# Patient Record
Sex: MALE | Race: WHITE | NOT HISPANIC OR LATINO | Employment: FULL TIME | URBAN - METROPOLITAN AREA
[De-identification: names, ages, dates, MRNs, and addresses within clinical notes are randomized per-mention and may not be internally consistent; named-entity substitution may affect disease eponyms.]

---

## 2018-09-08 NOTE — PROGRESS NOTES
[facility] Consult:  Consult from: Dr Niki Wilder  Consult for: Prostate nodule    Jones Germain is a 58 y.o. male who presents for evaluation of prostate nodule at the referral of Dr Wilder, who noted its presence on her last physical exam of patient 7/2/18.    Atripla for HIV x 25 years. Reports normal CD4 count and undetectable viral load. Has history of kidney stones - in 2006. Went to [facility] for lithotripsy at that time.    NTF 1-2. No urinary hesitancy. Rare intermittency. Feels like he empties his bladder.  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 Does drink afternoon/evening coffee  Has had UTIs in remote past but has been so long cant remember the last time he had one.  No blood in urine.  + smoker - 40 years at about 1 pack.  Only one stone he knows of in 2006 as above, no family history of stones.  No family history of any  malignancy.    Udip trc leuks, trc prot, 2+ blood    [facility] 7/5/18  psa 2.0, UA trc blood - UA micro with 0 rbc/hpf. eGFR 97, HIV RNA <20 (undetectable)  He does take ASA, [place].    Past Medical History:   Diagnosis Date    HIV (human immunodeficiency virus infection)     Hypercholesterolemia        History reviewed. No pertinent surgical history.    History reviewed. No pertinent family history.    Social History     Socioeconomic History    Marital status: Single     Spouse name: Not on file    Number of children: Not on file    Years of education: Not on file    Highest education level: Not on file   Social Needs    Financial resource strain: Not on file    Food insecurity - worry: Not on file    Food insecurity - inability: Not on file    Transportation needs - medical: Not on file    Transportation needs - non-medical: Not on file   Occupational History    Not on file   Tobacco Use    Smoking status: Current Every Day Smoker    Smokeless tobacco: Never Used   Substance and Sexual Activity    Alcohol use: No     Frequency: Never    Drug use:  "No    Sexual activity: Not on file   Other Topics Concern    Not on file   Social History Narrative    Not on file       Review of patient's allergies indicates:  No Known Allergies    Medications Reviewed: see MAR    ROS:    Constitutional: denies fevers, chills, night sweats, fatigue, malaise  Respiratory: negative for cough, shortness of breath, wheezing, dyspnea.  Cardiovascular:  negative for chest pain, varicose veins, ankle swelling, palpitations, syncope.  GI: negative for abdominal pain, heartburn, indigestion, nausea, vomiting, constipation, diarrhea, blood in stool.   Urology: as noted above in HPI  Endocrinology: negative for cold intolerance, excessive thirst, not feeling tired/sluggish, no heat intolerance.   Hematology/Lymph: negative for easy bleeding, easy bruising, swollen glands.  Musculoskeletal: negative for back pain, joint pain, joint swelling, neck pain.  Allergy-Immunology: negative for seasonal allergies, + HIV  Skin: negative for boils, breast lumps, hives, itching, rash.   Neurology: negative for, dizziness, headache, tingling/numbness, tremors.   Psych: satisfied with life; negative for, anxiety, depression, suicidal thoughts.     PHYSICAL EXAM:    Vitals:    09/10/18 0937   BP: 138/88   Pulse: 84   Resp: 18   Temp: 97.8 °F (36.6 °C)     Body mass index is 25.18 kg/m². Weight: 70.8 kg (156 lb) Height: 5' 6" (167.6 cm)       General: Alert, cooperative, no distress, appears stated age  Head: Normocephalic, without obvious abnormality, atraumatic  Neck: no masses, no thyromegaly, no lymphadenopathy  Eyes: PERRL, conjunctiva/corneas clear  Lungs: Respirations unlabored, normal effort, no accessory muscle use  CV: Warm and well perfused extremities  Abdomen: Soft, non-tender, no CVA tenderness, no hepatosplenomegaly, small left inguinal hernia impulse  Penis: phallus normal, circumcised, well cared for, no plaques or lesions.   Scrotum: no cysts, no lesions, no rash, no hydrocele. "   Epididymes: normal, nontender, symmetrical, no masses or cysts.   Testes: normal, both descended, no masses.   Urethra: palpably normal with orthotopic meatus of normal size    ERIBERTO: normal sphincter tone, no masses, no hemmorrhoids   PROSTATE: 20-25g, nontender, smooth symmetrical except for small <0.5cm firn nodule just to left of central sulcus.  Extremities: Extremities normal, atraumatic, no cyanosis or edema  Skin: Normal color, texture, and turgor, no rashes or lesions  Psych: Appropriate, well oriented, normal affect, normal mood  Neuro: Non-focal      Assessment/Diagnosis:    1. Prostate nodule  Case Request Operating Room: BIOPSY, PROSTATE, TRANSRECTAL APPROACH, WITH US GUIDANCE    Place in Outpatient    POCT URINE DIPSTICK WITHOUT MICROSCOPE   2. Cells and casts in urine  Urine culture    CANCELED: Urinalysis Microscopic       Plans:  I had a long discussion with the patient regarding the natural history of cancer in men as well as when diagnostics are indicated. We also discussed differential for elevated psa which also includes benign enlargement and prostatitis.  We did discuss that an elevated PSA is considered a PSA greater than 4 because statistically 20% of people in this value range are found to have prostate cancer, however we also discussed a bit about PSA velocity and trends and age specific psa elevations.  We did also discuss that despite PSA, findings a prostate nodule necessitate further investigation as well.  Given his discrete prostate nodule on physical exam, as well as history of immunodeficiency, I therefore recommended prostate biopsy to evaluate for underlying malignancy.  We discussed biopsy and in detail, including 1% risk of infectious complications including sepsis but that it is an otherwise safe diagnostic procedure with expected hematuria hematospermia after.      I went over the details of a transrectal ultrasound-guided biopsy of the prostate, and described the technique  in detail.   The patient will be given local injection anesthetic to block the prostate so as to minimize any pain. 12-14 biopsy specimens will be taken. These will be sent for histopathology analysis.   Complications include bleeding, fever and chills. He was also instructed to watch for any signs of fever. If he does have any fever or chills after, he was advised to come to the emergency room right away for intravenous antibiotics and possible admission to the hospital. He is to refrain from any strenuous activity including sexual activity for the next 72 hours after biopsy. He was also advised that he may have blood while urinating, during bowel movements as well as during ejaculations. He was given a prebiopsy/postbiopsy instruction sheet was reminding him to avoid aspirin and blood thinners for 7 days prior, take the Rxed antibiotics the day before, day of, day after biopsy, and perform a fleet enema at home morning of biopsy. All questions he had were answered in detail.     His urine dipstick today was positive for blood, though he most recently had formal urinalysis which was positive for blood as well but urinalysis microscopic exam which had no red blood cells.  As he just had a negative UA micro, will not repeat it today, though will send urine for culture in anticipation of upcoming procedure and for further evaluation given urinalysis dipstick for leukocytes and blood.     TRUS/bx scheduled at [facility] on 9/25/18 at patient's request

## 2018-09-08 NOTE — H&P (VIEW-ONLY)
[facility] Consult:  Consult from: Dr Niki Wilder  Consult for: Prostate nodule    Jones Germain is a 58 y.o. male who presents for evaluation of prostate nodule at the referral of Dr Wilder, who noted its presence on her last physical exam of patient 7/2/18.    Atripla for HIV x 25 years. Reports normal CD4 count and undetectable viral load. Has history of kidney stones - in 2006. Went to [facility] for lithotripsy at that time.    NTF 1-2. No urinary hesitancy. Rare intermittency. Feels like he empties his bladder.  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 Does drink afternoon/evening coffee  Has had UTIs in remote past but has been so long cant remember the last time he had one.  No blood in urine.  + smoker - 40 years at about 1 pack.  Only one stone he knows of in 2006 as above, no family history of stones.  No family history of any  malignancy.    Udip trc leuks, trc prot, 2+ blood    [facility] 7/5/18  psa 2.0, UA trc blood - UA micro with 0 rbc/hpf. eGFR 97, HIV RNA <20 (undetectable)  He does take ASA, [place].    Past Medical History:   Diagnosis Date    HIV (human immunodeficiency virus infection)     Hypercholesterolemia        History reviewed. No pertinent surgical history.    History reviewed. No pertinent family history.    Social History     Socioeconomic History    Marital status: Single     Spouse name: Not on file    Number of children: Not on file    Years of education: Not on file    Highest education level: Not on file   Social Needs    Financial resource strain: Not on file    Food insecurity - worry: Not on file    Food insecurity - inability: Not on file    Transportation needs - medical: Not on file    Transportation needs - non-medical: Not on file   Occupational History    Not on file   Tobacco Use    Smoking status: Current Every Day Smoker    Smokeless tobacco: Never Used   Substance and Sexual Activity    Alcohol use: No     Frequency: Never    Drug use:  "No    Sexual activity: Not on file   Other Topics Concern    Not on file   Social History Narrative    Not on file       Review of patient's allergies indicates:  No Known Allergies    Medications Reviewed: see MAR    ROS:    Constitutional: denies fevers, chills, night sweats, fatigue, malaise  Respiratory: negative for cough, shortness of breath, wheezing, dyspnea.  Cardiovascular:  negative for chest pain, varicose veins, ankle swelling, palpitations, syncope.  GI: negative for abdominal pain, heartburn, indigestion, nausea, vomiting, constipation, diarrhea, blood in stool.   Urology: as noted above in HPI  Endocrinology: negative for cold intolerance, excessive thirst, not feeling tired/sluggish, no heat intolerance.   Hematology/Lymph: negative for easy bleeding, easy bruising, swollen glands.  Musculoskeletal: negative for back pain, joint pain, joint swelling, neck pain.  Allergy-Immunology: negative for seasonal allergies, + HIV  Skin: negative for boils, breast lumps, hives, itching, rash.   Neurology: negative for, dizziness, headache, tingling/numbness, tremors.   Psych: satisfied with life; negative for, anxiety, depression, suicidal thoughts.     PHYSICAL EXAM:    Vitals:    09/10/18 0937   BP: 138/88   Pulse: 84   Resp: 18   Temp: 97.8 °F (36.6 °C)     Body mass index is 25.18 kg/m². Weight: 70.8 kg (156 lb) Height: 5' 6" (167.6 cm)       General: Alert, cooperative, no distress, appears stated age  Head: Normocephalic, without obvious abnormality, atraumatic  Neck: no masses, no thyromegaly, no lymphadenopathy  Eyes: PERRL, conjunctiva/corneas clear  Lungs: Respirations unlabored, normal effort, no accessory muscle use  CV: Warm and well perfused extremities  Abdomen: Soft, non-tender, no CVA tenderness, no hepatosplenomegaly, small left inguinal hernia impulse  Penis: phallus normal, circumcised, well cared for, no plaques or lesions.   Scrotum: no cysts, no lesions, no rash, no hydrocele. "   Epididymes: normal, nontender, symmetrical, no masses or cysts.   Testes: normal, both descended, no masses.   Urethra: palpably normal with orthotopic meatus of normal size    ERIBERTO: normal sphincter tone, no masses, no hemmorrhoids   PROSTATE: 20-25g, nontender, smooth symmetrical except for small <0.5cm firn nodule just to left of central sulcus.  Extremities: Extremities normal, atraumatic, no cyanosis or edema  Skin: Normal color, texture, and turgor, no rashes or lesions  Psych: Appropriate, well oriented, normal affect, normal mood  Neuro: Non-focal      Assessment/Diagnosis:    1. Prostate nodule  Case Request Operating Room: BIOPSY, PROSTATE, TRANSRECTAL APPROACH, WITH US GUIDANCE    Place in Outpatient    POCT URINE DIPSTICK WITHOUT MICROSCOPE   2. Cells and casts in urine  Urine culture    CANCELED: Urinalysis Microscopic       Plans:  I had a long discussion with the patient regarding the natural history of cancer in men as well as when diagnostics are indicated. We also discussed differential for elevated psa which also includes benign enlargement and prostatitis.  We did discuss that an elevated PSA is considered a PSA greater than 4 because statistically 20% of people in this value range are found to have prostate cancer, however we also discussed a bit about PSA velocity and trends and age specific psa elevations.  We did also discuss that despite PSA, findings a prostate nodule necessitate further investigation as well.  Given his discrete prostate nodule on physical exam, as well as history of immunodeficiency, I therefore recommended prostate biopsy to evaluate for underlying malignancy.  We discussed biopsy and in detail, including 1% risk of infectious complications including sepsis but that it is an otherwise safe diagnostic procedure with expected hematuria hematospermia after.      I went over the details of a transrectal ultrasound-guided biopsy of the prostate, and described the technique  in detail.   The patient will be given local injection anesthetic to block the prostate so as to minimize any pain. 12-14 biopsy specimens will be taken. These will be sent for histopathology analysis.   Complications include bleeding, fever and chills. He was also instructed to watch for any signs of fever. If he does have any fever or chills after, he was advised to come to the emergency room right away for intravenous antibiotics and possible admission to the hospital. He is to refrain from any strenuous activity including sexual activity for the next 72 hours after biopsy. He was also advised that he may have blood while urinating, during bowel movements as well as during ejaculations. He was given a prebiopsy/postbiopsy instruction sheet was reminding him to avoid aspirin and blood thinners for 7 days prior, take the Rxed antibiotics the day before, day of, day after biopsy, and perform a fleet enema at home morning of biopsy. All questions he had were answered in detail.     His urine dipstick today was positive for blood, though he most recently had formal urinalysis which was positive for blood as well but urinalysis microscopic exam which had no red blood cells.  As he just had a negative UA micro, will not repeat it today, though will send urine for culture in anticipation of upcoming procedure and for further evaluation given urinalysis dipstick for leukocytes and blood.     TRUS/bx scheduled at [facility] on 9/25/18 at patient's request

## 2018-09-10 ENCOUNTER — OFFICE VISIT (OUTPATIENT)
Dept: UROLOGY | Facility: CLINIC | Age: 58
End: 2018-09-10
Payer: COMMERCIAL

## 2018-09-10 VITALS
SYSTOLIC BLOOD PRESSURE: 138 MMHG | WEIGHT: 156 LBS | TEMPERATURE: 98 F | RESPIRATION RATE: 18 BRPM | HEART RATE: 84 BPM | BODY MASS INDEX: 25.07 KG/M2 | HEIGHT: 66 IN | DIASTOLIC BLOOD PRESSURE: 88 MMHG

## 2018-09-10 DIAGNOSIS — R82.998 CELLS AND CASTS IN URINE: ICD-10-CM

## 2018-09-10 DIAGNOSIS — N40.2 PROSTATE NODULE: Primary | ICD-10-CM

## 2018-09-10 LAB
BILIRUB SERPL-MCNC: ABNORMAL MG/DL
BLOOD URINE, POC: ABNORMAL
COLOR, POC UA: ABNORMAL
GLUCOSE UR QL STRIP: ABNORMAL
KETONES UR QL STRIP: ABNORMAL
LEUKOCYTE ESTERASE URINE, POC: ABNORMAL
NITRITE, POC UA: ABNORMAL
PH, POC UA: 5
PROTEIN, POC: ABNORMAL
SPECIFIC GRAVITY, POC UA: 1.01
UROBILINOGEN, POC UA: ABNORMAL

## 2018-09-10 PROCEDURE — 87086 URINE CULTURE/COLONY COUNT: CPT

## 2018-09-10 PROCEDURE — 81002 URINALYSIS NONAUTO W/O SCOPE: CPT | Mod: S$GLB,,, | Performed by: UROLOGY

## 2018-09-10 PROCEDURE — 99244 OFF/OP CNSLTJ NEW/EST MOD 40: CPT | Mod: 25,S$GLB,, | Performed by: UROLOGY

## 2018-09-10 PROCEDURE — 99999 PR PBB SHADOW E&M-NEW PATIENT-LVL IV: CPT | Mod: PBBFAC,,, | Performed by: UROLOGY

## 2018-09-10 RX ORDER — EFAVIRENZ, EMTRICITABINE, AND TENOFOVIR DISOPROXIL FUMARATE 600; 200; 300 MG/1; MG/1; MG/1
TABLET, FILM COATED ORAL
COMMUNITY
Start: 2018-09-08 | End: 2018-11-27 | Stop reason: SDUPTHER

## 2018-09-10 RX ORDER — PRAVASTATIN SODIUM 40 MG/1
TABLET ORAL
COMMUNITY
Start: 2018-06-21 | End: 2018-12-27 | Stop reason: SDUPTHER

## 2018-09-10 RX ORDER — GENTAMICIN SULFATE 40 MG/ML
80 INJECTION, SOLUTION INTRAMUSCULAR; INTRAVENOUS ONCE
Status: CANCELLED | OUTPATIENT
Start: 2018-09-25

## 2018-09-10 RX ORDER — CIPROFLOXACIN 500 MG/1
500 TABLET ORAL 2 TIMES DAILY
Qty: 6 TABLET | Refills: 0 | Status: SHIPPED | OUTPATIENT
Start: 2018-09-10 | End: 2018-10-15 | Stop reason: ALTCHOICE

## 2018-09-10 RX ORDER — LIDOCAINE HYDROCHLORIDE 10 MG/ML
10 INJECTION, SOLUTION EPIDURAL; INFILTRATION; INTRACAUDAL; PERINEURAL ONCE
Status: CANCELLED | OUTPATIENT
Start: 2018-09-10 | End: 2018-09-10

## 2018-09-10 NOTE — PATIENT INSTRUCTIONS
Transrectal Ultrasound and Biopsy    A transrectal ultrasound is an imaging test. It uses sound waves to create pictures of a mans prostate gland. Your prostate gland is in front of your rectum. For this test, a special probe (transducer) is placed directly into your rectum. During the test, tissue samples (a biopsy) may also be taken. The test is done by a specially trained technologist called a sonographer.  Getting ready for your test  · You may be asked to clear your bowel before the test. This may be done by injecting liquid into your rectum (an enema). Or it can be done by drinking a special liquid.  · You may be asked not to eat or drink anything after midnight the night before the test.  · Tell your health care provider about any medicines, herbs, or supplements you are taking. This includes any over-the-counter medicines such as aspirin or ibuprofen.  · Answer any questions your provider has about your medical history. This will help your provider tailor the test to your health needs.  During your test  · You may be asked to change into a gown. You will then lie on your side on an exam table, with your knees bent.  · The test is done with a hand-held probe. This is a short, slender laverne. It has a sterile, disposable cover. It is also greased (lubricated) with some gel. It is then gently placed inside your rectum.  · You will feel pressure from the probe. If you feel pain, let your provider know.  · If a biopsy is taken, it is done using a small probe with a very tiny needle on the end. This needle enters your prostate and removes several tiny samples of tissue. These samples are then sent to a lab to be examined. Any mild pain from the biopsy is usually minor.   After your test  Before leaving, you may need to wait for a short time while the images are reviewed. In most cases, you can go back to your normal routine after the test. If you had a biopsy, you may see some blood in your urine, sperm, or stool  for a day or so. This is normal. Your health care provider will let you know when your test results are ready.  In some cases, a diagnosis cant be made from the tissue sample that was taken. If this happens, your provider will talk with you about whether you may need another biopsy. Or you may need a different procedure.  When to call your health care provider  Call your provider if you have:  · Very bloody urine or stool  · A fever lasting 24 to 48 hours  · Any other symptoms that your provider asks you to report, based on your medical condition   Date Last Reviewed: 6/19/2015  © 8867-2715 [employer]. [address] [place] [address], [place] [address]. All rights reserved. This information is not intended as a substitute for professional medical care. Always follow your healthcare professional's instructions.

## 2018-09-10 NOTE — LETTER
September 15, 2018      Julissa Wilder MD  [address] [facility]  [address]  [place] [address]           [place] [facility]  [address] [place] [address] [address]  [place] [address]  Phone: [phone]  Fax: [phone]          Patient: Jones Germain   MR Number: [id number]   YOB: 1960   Date of Visit: 9/10/2018       Dear Dr. Julissa Wilder:    Thank you for referring Jones Germain to me for evaluation. Attached you will find relevant portions of my assessment and plan of care.    If you have questions, please do not hesitate to call me. I look forward to following Jones Germain along with you.    Sincerely,    Baljinder Johnson MD    Enclosure  CC:  No Recipients    If you would like to receive this communication electronically, please contact [email or web address] or [phone] to request more information on [employer] access.    For providers and/or their staff who would like to refer a patient to [facility], please contact us through our one-stop-shop provider referral line, [facility], at [phone].    If you feel you have received this communication in error or would no longer like to receive these types of communications, please e-mail [email or web address]

## 2018-09-11 LAB — BACTERIA UR CULT: NO GROWTH

## 2018-09-25 ENCOUNTER — HOSPITAL ENCOUNTER (OUTPATIENT)
Facility: AMBULARY SURGERY CENTER | Age: 58
Discharge: HOME | End: 2018-09-25
Attending: UROLOGY | Admitting: UROLOGY
Payer: COMMERCIAL

## 2018-09-25 DIAGNOSIS — N40.2 PROSTATE NODULE: ICD-10-CM

## 2018-09-25 PROCEDURE — 55700 PR BIOPSY OF PROSTATE,NEEDLE/PUNCH: CPT | Mod: ,,, | Performed by: UROLOGY

## 2018-09-25 PROCEDURE — 76942 ECHO GUIDE FOR BIOPSY: CPT | Mod: 26,59,, | Performed by: UROLOGY

## 2018-09-25 PROCEDURE — 88305 TISSUE EXAM BY PATHOLOGIST: CPT | Mod: 26,,, | Performed by: PATHOLOGY

## 2018-09-25 PROCEDURE — 88305 TISSUE EXAM BY PATHOLOGIST: CPT | Performed by: PATHOLOGY

## 2018-09-25 PROCEDURE — 76872 US TRANSRECTAL: CPT | Mod: 26,,, | Performed by: UROLOGY

## 2018-09-25 PROCEDURE — 76872 US TRANSRECTAL: CPT | Performed by: UROLOGY

## 2018-09-25 PROCEDURE — 55700 HC PROSTATE NEEDLE BIOPSY: CPT | Performed by: UROLOGY

## 2018-09-25 RX ORDER — LIDOCAINE HYDROCHLORIDE 10 MG/ML
10 INJECTION, SOLUTION EPIDURAL; INFILTRATION; INTRACAUDAL; PERINEURAL ONCE
Status: COMPLETED | OUTPATIENT
Start: 2018-09-25 | End: 2018-09-25

## 2018-09-25 RX ORDER — GENTAMICIN SULFATE 40 MG/ML
80 INJECTION, SOLUTION INTRAMUSCULAR; INTRAVENOUS ONCE
Status: COMPLETED | OUTPATIENT
Start: 2018-09-25 | End: 2018-09-25

## 2018-09-25 RX ORDER — GENTAMICIN SULFATE 40 MG/ML
INJECTION, SOLUTION INTRAMUSCULAR; INTRAVENOUS
Status: DISCONTINUED
Start: 2018-09-25 | End: 2018-09-25 | Stop reason: HOSPADM

## 2018-09-25 RX ADMIN — LIDOCAINE HYDROCHLORIDE 100 MG: 10 INJECTION, SOLUTION EPIDURAL; INFILTRATION; INTRACAUDAL; PERINEURAL at 01:09

## 2018-09-25 RX ADMIN — GENTAMICIN SULFATE 80 MG: 40 INJECTION, SOLUTION INTRAMUSCULAR; INTRAVENOUS at 12:09

## 2018-09-25 NOTE — PLAN OF CARE
Tolerated procedure well, voided, some bleeding, drinks water, ambulatory, discussed post op care.

## 2018-09-25 NOTE — DISCHARGE INSTRUCTIONS
After your prostate biopsy    Avoid sexual activity,lifting, strenuous physical activity or exertion for 3 days     No riding mowers, tractors, bicycles, motorcycles for 2-3 weeks    You may experience blood in your urine or stool for up to 2 weeks and in your semen for up to 6 weeks.  This is a normal side effect of the procedure and will resolve.    Drink plenty of water    Take antibiotics as prescribed    If you experience any of the following conditions, please return immediately to the clinic (during office hrs) or the Emergency Room if after hours:       Fever       Inabiltiy to urinate       Severe bleeding    You may resume aspirin, anti inflammatory and blood thinners in 3 days    Results take at minimum 10 business days.  A 2 week follow up will be scheduled to review pathology reports in the clinic    During office hours, please call  and ask to speak with the nurse if you have any questions.  If after hours, call the [facility] On [facility] # to be connectied t o the doctor on call  Prostate Needle Biopsy    Prostate needle biopsy is a test to look for prostate cancer. During the test, a thin, hollow needle is used to take small samples of tissue from the prostate. The samples are then tested in a lab.  Getting ready for the procedure  Prepare as you have been told. In addition to the following:  · Tell your healthcare provider about all medicines you take. This includes herbs and other supplements. It also includes any blood thinners, such as warfarin, clopidogrel, or daily aspirin. You may need to stop taking some or all of them before the procedure.  · You may be told to use a laxative, enemas, or both before the biopsy. This is to empty the colon and rectum of stool. Follow the instructions you are given.  · Your healthcare provider may prescribe antibiotics before the procedure. If so, take these as directed.  Risks and possible complications   All procedures have risks. The risks of this  procedure include:  · Discomfort in the prostate area  · Infection in the urinary tract or prostate  · Infection in the bloodstream  · Rectal or urinary bleeding   The day of the procedure  The procedure is done in a healthcare providers office or a hospital. It takes about 45 minutes. You will be able to go home the same day. Transrectal ultrasound is often used during the procedure. This test uses sound waves to make images on a computer screen. The images help the healthcare provider insert the needle in the correct place. During the biopsy:  · If ultrasound will be used, you may be asked to drink water to fill your bladder.    · You may lie on your side on an exam table.   · The ultrasound transducer, which is about the size of a finger, is lubricated. It is then inserted into the rectum. This will feel like a prostate exam. The transducer is moved until the prostate can be seen in the ultrasound images.    · To numb the biopsy area, local anesthetics may be injected. You might also be given medicine to make you sleepy.  · Using the ultrasound images as a guide, the biopsy needle is inserted. It may be inserted through the rectum or through the skin between the scrotum and the anus (perineum).  · The needle is used to take tissue samples from the prostate. About 12 samples are taken from different areas of the prostate. These samples are sent to a lab to be tested for cancer.  After the biopsy  At first you may feel a little lightheaded, especially if you had medicine to make you sleepy. You can lie on the table until you feel able to stand. You can go home once you are feeling better. You can go back to your normal activities. You may have some blood in your urine or stool that day. This is normal. You may also notice blood in your semen for weeks after the biopsy. This is normal and not dangerous. Your healthcare provider can tell you more about what to expect.  Follow-up care  You will see your healthcare  provider for a follow-up visit. Depending on the biopsy results, you may be scheduled for more tests. If signs of cancer are found, you and your healthcare provider can discuss options for further testing.  When to call your healthcare provider  Call your healthcare provider if you have any of the following:  · Blood clots in your urine  · Bloody diarrhea  · Blood in the urine or stool that doesnt go away after 48 hours  · Chest pain or trouble breathing (call 911)  · Chills  · Feeling of weakness  · Fever of 100.4°F (38°C) or higher, or as directed by your healthcare provider  · Inability to urinate   Date Last Reviewed: 5/1/2017  © 2093-7602 [employer]. [address] [place] [address], [place] [address]. All rights reserved. This information is not intended as a substitute for professional medical care. Always follow your healthcare professional's instructions.

## 2018-09-25 NOTE — INTERVAL H&P NOTE
The patient has been examined and the H&P has been reviewed:    Pt is acceptable candidate for procedure at [facility]. No changes    Anesthesia/Surgery risks, benefits and alternative options discussed and understood by patient/family.          Active Hospital Problems    Diagnosis  POA    Prostate nodule [N40.2]  Yes      Resolved Hospital Problems   No resolved problems to display.

## 2018-09-26 VITALS
SYSTOLIC BLOOD PRESSURE: 128 MMHG | TEMPERATURE: 98 F | BODY MASS INDEX: 25.07 KG/M2 | WEIGHT: 156 LBS | DIASTOLIC BLOOD PRESSURE: 91 MMHG | HEIGHT: 66 IN | HEART RATE: 80 BPM | RESPIRATION RATE: 20 BRPM | OXYGEN SATURATION: 100 %

## 2018-09-26 NOTE — OP NOTE
[facility] Urology Operative/Brief Discharge Note     Date: 9/25/18     Staff Surgeon: Baljinder Johnson MD     Pre-Op Diagnosis:   Prostate nodule     Post-Op Diagnosis: same     Procedure(s) Performed:   Transrectal ultrasound guided prostate needle biopsy     INDICATION FOR PROCEDURE:   59 yo M with HIV found to have prostate nodule on ERIBERTO, small at LA.     ANESTHESIA: Local periprostatic block; 10 cc 1% lidocaine     PSA: 2.0     TRUS VOLUME:57.3cm3  (W 50.3mm, H 35.7mm, L 61mm)     EBL: Minimal     SPECIMEN:   15 Prostate Biopsy Cores: right and left base, apex, and mid - medial and lateral of each  and bilateral transition zones, as well as core at LA nodule     ULTRASOUND FINDINGS: + median lobe, hypoechoities in L SV, mildly heterogenous apices, extension at LA consistent with palpable nodule     CONFIRMED PATIENT TOOK ANTIBIOTICS: Yes     CONFIRMED PATIENT NOT TAKING ASPIRIN OR ANTICOAGULANTS: Yes     CONFIRMED PATIENT USED ENEMA: Yes     PROCEDURE IN DETAIL:  After informed consent, the patient was placed in the left lateral position and TRUS probe inserted into rectum. Ultrasound measurements taken as above and ultrasound of prostate performed with findings as above. Saggital image captured.     Approximately 10cc of 1% lidocaine injected bilaterally in lakshmi-prostatic block fashion, as well as at the apex.   15 core biopies taken in a sextant fashion, as described in specimens as above, ultrasound guided.  Standard 12 core biopsy template, with the addition of transition zones, and a core at visualized nodule     Pt tolerated procedure well without complication     CONDITION: Stable     DISHARGE:  Status post uncomplicated outpatient procedure as above.   Disposition: Home.  He will follow up in 2 weeks for biopsy results.    Resume regular diet  FU 2 weeks for biopsy results  Return to ER if temp >101, uncontrollable urethral or rectal bleeding, or inability to urinate/urinary retention  No sex/ejaculation  x3 days, no riding mowers/tractors/bikes x2-4 weeks  Hold asa/bloodthinners x 3 days  Drink plenty of water may see blood in urine  Follow instructions of biopsy sheet

## 2018-10-07 NOTE — PROGRESS NOTES
[facility] Urology Progress Note    Jones Germain is a 58 y.o. male who presents for follow up of prostate biopsy 2/2 to prostate nodule    He has a history of HIV and has been on atripla for 25 years with undetectable viral, who was referred by Dr. Wilder after discovering prostate nodule on exam in July 2018  PSA 2.0. No famHx CaP  Has history of kidney stones - in 2006. Went to [facility] for lithotripsy at that time. Only the one episode  NTF 1-2. No urinary hesitancy. Rare intermittency. Feels like he empties his bladder.  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 Does drink afternoon/evening coffee  + smoker - 40 years at about 1 pack.  ERIBERTO: 20-25g, nontender, smooth symmetrical except for small <0.5cm firn nodule just to left of central sulcus    Prostate biopsy 9/25/18:  TRUS VOLUME:57.3cm3 (W 50.3mm, H 35.7mm, L 61mm)  SPECIMEN:    ULTRASOUND FINDINGS: + median lobe, hypoechoities in L SV, mildly heterogenous apices, extension at LA consistent with palpable nodule  PATHOLOGY: 5/15 cores aiden 3+3=6 CaP, largely left sided, and one core ASAP as below    5. PROSTATE, RIGHT APEX LATERAL, BIOPSY:  - Small focus of prostatic adenocarcinoma, Given score 3+3=6 (Grade group 1), involving less than 5% of  one (1) core.  - Total length of carcinoma is <1 mm.  6. PROSTATE, RIGHT APEX MEDIAL, BIOPSY:  - Prostatic adenocarcinoma, Aiden score 3+3=6 (Grade group 1) involving 10% of one (1) core.  - Total length of carcinoma is 1 mm.  - Perineural invasion is present.  7. PROSTATE, RIGHT TRANSITION ZONE, BIOPSY:  - Prostate tissue with small focus of atypical glands, suspicious for carcinoma.  11. PROSTATE, LEFT MID MEDIAL, BIOPSY:  - Small focus of prostatic adenocarcinoma, Given score 3+3=6 (Grade group 1), involving less than 5% of  one (1) core.  - Total length of carcinoma is <1 mm.  12. PROSTATE, LEFT APEX LATERAL, BIOPSY:  - Prostatic adenocarcinoma, Aiden score 3+3=6 (Grade group 1) involving 20% of one  "(1) core.  - Total length of carcinoma is 2 mm.  14. PROSTATE, LEFT TRANSITION ZONE, BIOPSY:  - Prostatic adenocarcinoma, Aiden score 3+3=6 (Grade group 1) involving 30% of one (1) core.  - Total length of carcinoma is 3.5 mm.    He did well after the prostate biopsy with minimal discomfort, minimal bleeding.  No fevers, no chills.  Returns today to discuss pathology and next steps    ROS: A comprehensive 10 system review was performed and is negative except as noted above in HPI    PHYSICAL EXAM:    Vitals:    10/15/18 1432   BP: 120/73   Pulse: 92   Resp: 18   Temp: 98.6 °F (37 °C)     Body mass index is 25.19 kg/m². Weight: 70.8 kg (156 lb 1.4 oz) Height: 5' 6" (167.6 cm)       General: Alert, cooperative, no distress, appears stated age   Head: Normocephalic, without obvious abnormality, atraumatic   Eyes: PERRL, conjunctiva/corneas clear   Lungs: Respirations unlabored   Heart: Warm and well perfused   Abdomen: sift NT ND   Extremities: Extremities normal, atraumatic, no cyanosis or edema   Skin: Skin color, texture, turgor normal, no rashes or lesions   Psych: Appropriate   Neurologic: Non-focal       ASSESSMENT   1. Prostate cancer  PROSTATE SPECIFIC ANTIGEN, DIAGNOSTIC   2. Prostate nodule         Plan    I sat with the patient and explained in detail his diagnosis of prostate cancer, including explanation of aiden grading system, and went over all the treatment options for management of his prostate cancer. The treatment options include detailed discussions of active surveillance, radiation treatment including external beam as well as brachytherapy, and surgical extirpative therapy namely robotic prostatectomy. We did discuss that given his pathology of aiden 6 CaP that active surveillance is an option, though discussed that given number of cores positive with palpable nodule and this clinical T2a disease, would likely benefit from treatment. Risks and benefits of radiation and surgery, as well as " surveillance protocols, were discussed in detail and I answered many questions about various aspects of all options, of which I answered radiation related questions to the best of my ability.     We discussed radical prostatectomy. The procedure in general including hospital stay and postoperative process were discussed in detail. Risks of surgery were discussed including but not limited to up-front urinary incontinence and erectile dysfunction which we will work to overcome with kegel excercises and any number of ED therapies, versus side effects of radiation which are often minimal and irritative upfront with more troubling complications such as stricture and radiation cystitis occurring late. We also briefly discussed psa monitoring post-treatment and had brief discussion about psa recurrence, noting radiation could be used as salvage therapy after surgery but not often vice versa. We discussed concurrent pelvic lymphadenectomy with surgery, and that sometimes lymph nodes are included in radiation fields dependent on risk. Also discussed concurrent use of ADT with radiation and its side effects such as fatigue, hot flashes, ED. Given G6 pathology, could likely eliminate ADT.   I did elaborate on the urologist's involvement in radiation treatment such as starting ADT and placing fiducial markers about 6 weeks later before starting XRT. The need for monitoring his PSA postoperatively was also discussed with the patient regardless of treatment modality selected.     We did also discuss active surveillance, following PSA closely every 6 months rather than annually to establish trend, with repeat biopsy at minimum at 1 year as a confirmatory biopsy, targeting for areas of previous positivity to determine if any pathologic upgrading or upstaging.  Should PSA be stable, and repeat biopsy be stable, can alternate evaluations with biopsy and MRI, increasing interval between investigations unless PSA velocity increases at  which time can re-evaluate based on PSA.  - specific to this patient we did discuss again that although he has low risk [place] 6 pathology, he does have palpable nodule, is young for prostate cancer at age 58 which could portend increased anxiety/expense/risks of further investigations on surveillance protocol, and may have more benefit from up-front treatment of prostate cancer albeit with noted side effects as discussed of treatment options.       I did use the [facility] nomogram, noting it was based on surgical outcomes and final pathology review, but used to assess risk and profile his disease process. Demonstrated 99% disease specific survival and 96% 5-year / 92% 10-year progression-free survival after prostatectomy, with 40% risk of RADHA with 2% risk of LN involvement, 1% risk of SVI. He had several good questions which were all answered in detail. I also provided a copy of the [facility] patient guide to Localized Prostate Cancer. At this time I encouraged he to read through the materials provided and make notes with any questions.     I offered to make a referral to a radiation-oncologist to discuss radiation option further, and he deferred at this time.  We did briefly discuss minimally invasive/robotic prostatectomy, and a pamphlet on robotic prostatectomy was provided. Specifics to surgical procedure, hospital stay, and recovery were discussed.    At this time, the patient would like to start active surveillance after extensive discussion of risks, benefits, treatment options.  He did note that if he was to proceed with treatment he would prefer to do so when he is off for the summer next year.  He will consider treatment in that interval, and I did note that we will continue active surveillance and PSA screening, and if in less than 1 year's time he does decide to proceed with treatment, no repeat biopsy would be necessary.     45 minutes spent in face to face encounter with patient, over  half in counseling.  RTC 6 months with PSA prior  As we did discuss minimally invasive robotic prostatectomy, length of catheterization, and recovery., noting relative quick return to work, if he would like to proceed anywhere in the interim he is welcome to follow up prior to 6 months time

## 2018-10-15 ENCOUNTER — OFFICE VISIT (OUTPATIENT)
Dept: UROLOGY | Facility: CLINIC | Age: 58
End: 2018-10-15
Payer: COMMERCIAL

## 2018-10-15 VITALS
WEIGHT: 156.06 LBS | HEIGHT: 66 IN | TEMPERATURE: 99 F | RESPIRATION RATE: 18 BRPM | DIASTOLIC BLOOD PRESSURE: 73 MMHG | HEART RATE: 92 BPM | SYSTOLIC BLOOD PRESSURE: 120 MMHG | BODY MASS INDEX: 25.08 KG/M2

## 2018-10-15 DIAGNOSIS — N40.2 PROSTATE NODULE: ICD-10-CM

## 2018-10-15 DIAGNOSIS — C61 PROSTATE CANCER: Primary | ICD-10-CM

## 2018-10-15 PROCEDURE — 99215 OFFICE O/P EST HI 40 MIN: CPT | Mod: S$GLB,,, | Performed by: UROLOGY

## 2018-10-15 PROCEDURE — 3008F BODY MASS INDEX DOCD: CPT | Mod: CPTII,S$GLB,, | Performed by: UROLOGY

## 2018-10-15 PROCEDURE — 99999 PR PBB SHADOW E&M-EST. PATIENT-LVL III: CPT | Mod: PBBFAC,,, | Performed by: UROLOGY

## 2018-10-15 NOTE — LETTER
October 20, 2018        Julissa Wilder MD  [address]  [address]  [place] [address]             [place] [facility]  [address] [place] [address] [address]  [place] [address]  Phone: [phone]  Fax: [phone]   Patient: Jones Germain   MR Number: [id number]   YOB: 1960   Date of Visit: 10/15/2018       Dear Dr. Wilder:    Thank you for referring Jones Germain to me for evaluation. Attached you will find relevant portions of my assessment and plan of care.    If you have questions, please do not hesitate to call me. I look forward to following Jones Germain along with you.    Sincerely,      Baljinder Johnson MD            CC  No Recipients    Enclosure

## 2018-11-27 DIAGNOSIS — B20 HUMAN IMMUNODEFICIENCY VIRUS (HIV) DISEASE: Primary | ICD-10-CM

## 2018-11-27 RX ORDER — EFAVIRENZ, EMTRICITABINE AND TENOFOVIR DISOPROXIL FUMARATE 600; 200; 300 MG/1; MG/1; MG/1
1 TABLET, FILM COATED ORAL NIGHTLY
Qty: 30 TABLET | Refills: 6 | Status: SHIPPED | OUTPATIENT
Start: 2018-11-27 | End: 2018-12-27 | Stop reason: SDUPTHER

## 2018-11-27 NOTE — TELEPHONE ENCOUNTER
Insurance will no longer cover atripla in Brand name beginning January/2019.  I told we would send it in as generic and if we get something back from the pharmacy I will then give his insurance company a call @ [phone].  He also provided me with a web address as well.  [email or web address]

## 2018-12-26 RX ORDER — NAPROXEN SODIUM 220 MG/1
81 TABLET, FILM COATED ORAL DAILY
COMMUNITY
End: 2020-11-11 | Stop reason: SDUPTHER

## 2018-12-27 ENCOUNTER — OFFICE VISIT (OUTPATIENT)
Dept: INFECTIOUS DISEASES | Facility: CLINIC | Age: 58
End: 2018-12-27
Payer: COMMERCIAL

## 2018-12-27 VITALS
OXYGEN SATURATION: 96 % | DIASTOLIC BLOOD PRESSURE: 75 MMHG | BODY MASS INDEX: 24.11 KG/M2 | HEART RATE: 116 BPM | WEIGHT: 150 LBS | HEIGHT: 66 IN | SYSTOLIC BLOOD PRESSURE: 120 MMHG | TEMPERATURE: 97 F

## 2018-12-27 DIAGNOSIS — Z01.818 PRE-OPERATIVE EXAMINATION FOR INTERNAL MEDICINE: ICD-10-CM

## 2018-12-27 DIAGNOSIS — C61 PROSTATE CANCER: ICD-10-CM

## 2018-12-27 DIAGNOSIS — E88.1 LIPODYSTROPHY DUE TO HIV INFECTION AND ANTIRETROVIRAL THERAPY: ICD-10-CM

## 2018-12-27 DIAGNOSIS — F17.200 SMOKER: ICD-10-CM

## 2018-12-27 DIAGNOSIS — E78.00 HIGH CHOLESTEROL: ICD-10-CM

## 2018-12-27 DIAGNOSIS — B20 LIPODYSTROPHY DUE TO HIV INFECTION AND ANTIRETROVIRAL THERAPY: ICD-10-CM

## 2018-12-27 DIAGNOSIS — B20 HUMAN IMMUNODEFICIENCY VIRUS (HIV) DISEASE: Primary | ICD-10-CM

## 2018-12-27 DIAGNOSIS — Z79.899 LIPODYSTROPHY DUE TO HIV INFECTION AND ANTIRETROVIRAL THERAPY: ICD-10-CM

## 2018-12-27 PROCEDURE — 99214 OFFICE O/P EST MOD 30 MIN: CPT | Mod: ,,, | Performed by: INTERNAL MEDICINE

## 2018-12-27 PROCEDURE — 3008F BODY MASS INDEX DOCD: CPT | Mod: ,,, | Performed by: INTERNAL MEDICINE

## 2018-12-27 RX ORDER — EFAVIRENZ, EMTRICITABINE AND TENOFOVIR DISOPROXIL FUMARATE 600; 200; 300 MG/1; MG/1; MG/1
1 TABLET, FILM COATED ORAL NIGHTLY
Qty: 30 TABLET | Refills: 6 | Status: SHIPPED | OUTPATIENT
Start: 2018-12-27 | End: 2019-01-08 | Stop reason: ALTCHOICE

## 2018-12-27 RX ORDER — PRAVASTATIN SODIUM 40 MG/1
40 TABLET ORAL DAILY
Qty: 30 TABLET | Refills: 6 | Status: SHIPPED | OUTPATIENT
Start: 2018-12-27 | End: 2020-03-17 | Stop reason: SDUPTHER

## 2018-12-27 RX ORDER — BUPROPION HYDROCHLORIDE 150 MG/1
150 TABLET ORAL EVERY MORNING
Qty: 30 TABLET | Refills: 6 | Status: SHIPPED | OUTPATIENT
Start: 2018-12-27 | End: 2020-10-07

## 2018-12-27 NOTE — PROGRESS NOTES
Subjective:       Patient ID: Jones Germain is a 58 y.o. male.    Chief Complaint:: Follow-up (6 mos check up)    HPI  Discussed prostate cancer diagnosis and options, admits depression. Concerned about requiring catheter, being incontinent, radiation adverse effects. He is planning to do the surgery before summer break so that he will have time to recover.  Did receive his flu vaccine  Converted to generic atripla  Compliant with meds. Still smoking. Has not yet established with primary care. Discussed that he will need clearance for surgery and anesthesia      Current Outpatient Medications:     aspirin 81 MG Chew, Take 81 mg by mouth once daily., Disp: , Rfl:     efavirenz-emtrictabine-tenofovir 600-200-300 mg (ATRIPLA) 600-200-300 mg Tab, Take 1 tablet by mouth every evening., Disp: 30 tablet, Rfl: 6    pravastatin (PRAVACHOL) 40 MG tablet, Take 1 tablet (40 mg total) by mouth once daily., Disp: 30 tablet, Rfl: 6    buPROPion (WELLBUTRIN XL) 150 MG TB24 tablet, Take 1 tablet (150 mg total) by mouth every morning., Disp: 30 tablet, Rfl: 6    varicella-zoster gE-AS01B, PF, (SHINGRIX, PF,) 50 mcg/0.5 mL injection, Inject 0.5 mLs into the muscle once. Once now and second dose in 2-6 months for 1 dose, Disp: 0.5 mL, Rfl: 0  Review of patient's allergies indicates:  No Known Allergies  Past Medical History:   Diagnosis Date    Adverse effect of hepatitis B vaccine     non responder    Hepatitis A 1991    High frequency hearing loss     HIV (human immunodeficiency virus infection) 1995    On COM/cRix until kidney stones, sallie 200    Hypercholesterolemia     Hyperlipidemia     Kidney stone 2005    [facility], Lithotripsy    Lipoatrophy     Mild    Pertussis     As a child    Prostate cancer     Secondary syphilis 2002    Smoker     Vertigo 02/2018    Due to Ear Infection     Past Surgical History:   Procedure Laterality Date    BIOPSY, PROSTATE, RECTAL APPROACH, WITH US GUIDANCE N/A 9/25/2018    Performed  by [facility]jinder [facility] at [facility]     Social History     Socioeconomic History    Marital status: Single     Spouse name: None    Number of children: None    Years of education: None    Highest education level: None   Social Needs    Financial resource strain: None    Food insecurity - worry: None    Food insecurity - inability: None    Transportation needs - medical: None    Transportation needs - non-medical: None   Occupational History    Occupation: Teacher   Tobacco Use    Smoking status: Current Every Day Smoker     Types: Cigarettes    Smokeless tobacco: Never Used    Tobacco comment: 20 to 30   Substance and Sexual Activity    Alcohol use: No     Frequency: Never    Drug use: No    Sexual activity: No     Comment: Abstinent   Other Topics Concern    None   Social History Narrative    None     Family History   Problem Relation Age of Onset    Diabetes Mother          in a MVC    COPD Father     Lung cancer Paternal Grandmother        Travel History:   Vaccine History: Yearly flu vaccine, Pneumovax , , Prevnar , tetanus , Td AP , Zostavax 2016, hepatitis B ×3   Advanced Directive:   Safer Sex: Abstinent  Bone Density: 2011 osteopenia  Colonoscopy:     Review of Systems    Constitutional: No fever, chills, sweats, fatigue, weakness, weight loss    Eyes: No change in vision, loss of vision, diplopia, photophobia.     ENT: No sinus drainage, sore throat, mouth pain, or lesions.     Cardiovascular: No chest pain, GAVIN, palpitations or pedal edema    Respiratory: No shortness of breath, GAVIN, cough, wheeze, sputum, pleurisy, or hemoptysis    Gastrointestinal: No abdominal pain, nausea, vomiting, diarrhea, constipation, blood in stool, or focal abd pain    Genitourinary: No dysuria, hematuria, incontinence, frequency, flank pain, has nocturia ×2     Musculoskeletal: No new pain, joint swelling, or injuries    Integumentary: No new rashes, lesions, or  "wounds    Neurological: No dizziness, vertigo, unusual headaches, neuropathy, or falls    Psychiatric: Has anxiety, depression, and anhedonia,  has withdrawn socially a bit. No memory loss, sleep disturbance or substance abuse. He has responded to Wellbutrin in the past and is agreeable to taking some again. Not interested in counseling    Endocrine: No diabetes Thyroid normal    Lymphatic: No lymphadenopathy, blood loss, anemia, or malignancy    Objective:      Blood pressure 120/75, pulse (!) 116, temperature 97.3 °F (36.3 °C), temperature source Temporal, height 5' 6" (1.676 m), weight 68 kg (150 lb), SpO2 96 %. Body mass index is 24.21 kg/m².  Physical Exam      General: Alert and attentive, cooperative and in no distress, anxious and a little tachycardic.    Eyes: Pupils equal, round, reactive to light, anicteric, EOMI    Neck: Supple, non-tender, no thyromegaly or masses    ENT: EAC patent, TM normal, nares patent, no oral lesions, teeth in good condition, no thrush    Cardiovascular: Regular rate and rhythm, no murmurs, rubs, or gallop    Respiratory: Lungs clear without wheezes, no rales, rub or rhonci    Gastrointestinal: Active bowel sounds, soft, no mass or organomegaly, no tenderness or distention    Genitourinary: No flank tenderness    Vascular: No peripheral edema, phlebitis, pulses normal. Warm and well perfused    Musculoskeletal: Ambulates without difficulty, no acute arthritis, synovitis or myositis. Normal muscle bulk and strength    Integumentary: Skin without rashes, lesions, or wounds    AnusRectum:     Neurological: Normal LOC, cranial nerves, speech, reflexes, normal gait    Psychiatric: A little more withdrawn and anxious than usual.     Lymphatic: No cervical, supraclavicular, axillary, or inguinal lymphadenopathy      Wound:     HIV Table:   HLA-B 5701     TOXOIgG     RPR 1/20/2018 non reactive   HEP A IgG 6/8/2015 infection/immune   HBsAg 6/8/2015 negative   HBsAb 12/3/2016 negative, non " responder   HBcAb     HBeAb     HBeAg     HCVAb 7/8/2018  negative   HBV DNA     HCV RNA     Vitamin D 1/20/2018 40   Chlamydia / GC 12/5/2014 negative   TB Gold  12/2015  negative  PSA   7/8/2018  2.0    Recent Diagnostics: Reviewed July lab       Assessment and Plan:           Human immunodeficiency virus (HIV) disease  -     efavirenz-emtrictabine-tenofovir 600-200-300 mg (ATRIPLA) 600-200-300 mg Tab; Take 1 tablet by mouth every evening.  Dispense: 30 tablet; Refill: 6  -     pravastatin (PRAVACHOL) 40 MG tablet; Take 1 tablet (40 mg total) by mouth once daily.  Dispense: 30 tablet; Refill: 6  -     CBC auto differential; Future; Expected date: 12/27/2018  -     Comprehensive metabolic panel; Future; Expected date: 12/27/2018  -     HIV RNA, quantitative, PCR; Future; Expected date: 12/27/2018  -     Quantiferon Gold TB; Future; Expected date: 12/27/2018  -     RPR; Future; Expected date: 12/27/2018  -     Hemoglobin A1c; Future; Expected date: 12/27/2018    High cholesterol  -     pravastatin (PRAVACHOL) 40 MG tablet; Take 1 tablet (40 mg total) by mouth once daily.  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 Once now and second dose in 2-6 months for 1 dose  Dispense: 0.5 mL; Refill: 0  -     buPROPion (WELLBUTRIN XL) 150 MG TB24 tablet; Take 1 tablet (150 mg total) by mouth every morning.  Dispense: 30 tablet; Refill: 6      Will refer you to primary care for general medical care and pre-operative evaluation, [facility] physicians network.   They should call with offers for appointments. If you do not hear from them by next Wednesday, call us.     Next  visit in May with lab ahead    Lab now: CBC CMP viral load, RPR, TB Gold, hemoglobin A1c    Bupropion XL(or SR) 150 mg one each morning. Offered counseling but he does not feel this to be of benefit.    This note was created using Dragon voice recognition software that occasionally misinterpreted phrases or words.

## 2018-12-27 NOTE — PATIENT INSTRUCTIONS
Will refer you to primary care for general medical care and pre-operative evaluation, [facility] physicians network.   They should call with offers for appointments. If you do not hear from them by next Wednesday, call us.     Next visit in May with lab ahead    Lab now: CBC CMP viral load, RPR, TB Gold, hemoglobin A1c    Bupropion XL(or SR) 150 mg one each morning

## 2019-01-03 ENCOUNTER — TELEPHONE (OUTPATIENT)
Dept: INFECTIOUS DISEASES | Facility: CLINIC | Age: 59
End: 2019-01-03

## 2019-01-03 LAB
ALBUMIN SERPL-MCNC: 5.1 G/DL (ref 3.5–5.5)
ALBUMIN/GLOB SERPL: 2.3 {RATIO} (ref 1.2–2.2)
ALP SERPL-CCNC: 126 IU/L (ref 39–117)
ALT SERPL-CCNC: 40 IU/L (ref 0–44)
AST SERPL-CCNC: 27 IU/L (ref 0–40)
BASOPHILS # BLD AUTO: 0 X10E3/UL (ref 0–0.2)
BASOPHILS NFR BLD AUTO: 0 %
BILIRUB SERPL-MCNC: 0.3 MG/DL (ref 0–1.2)
BUN SERPL-MCNC: 11 MG/DL (ref 6–24)
BUN/CREAT SERPL: 12 (ref 9–20)
CALCIUM SERPL-MCNC: 9.6 MG/DL (ref 8.7–10.2)
CHLORIDE SERPL-SCNC: 102 MMOL/L (ref 96–106)
CO2 SERPL-SCNC: 23 MMOL/L (ref 20–29)
CREAT SERPL-MCNC: 0.92 MG/DL (ref 0.76–1.27)
EGFR IF AFRICAN AMERICAN: 105 ML/MIN/1.73
EOSINOPHIL # BLD AUTO: 0.1 X10E3/UL (ref 0–0.4)
EOSINOPHIL NFR BLD AUTO: 2 %
ERYTHROCYTE [DISTWIDTH] IN BLOOD BY AUTOMATED COUNT: 13.9 % (ref 12.3–15.4)
EST. GFR  (NON AFRICAN AMERICAN): 91 ML/MIN/1.73
GAMMA INTERFERON BACKGROUND BLD IA-ACNC: 0.02 IU/ML
GLOBULIN SER CALC-MCNC: 2.2 G/DL (ref 1.5–4.5)
GLUCOSE SERPL-MCNC: 100 MG/DL (ref 65–99)
HBA1C MFR BLD: 5.1 % (ref 4.8–5.6)
HCT VFR BLD AUTO: 49.6 % (ref 37.5–51)
HGB BLD-MCNC: 16.8 G/DL (ref 13–17.7)
HIV1 RNA # SERPL NAA+PROBE: <20 COPIES/ML
HIV1 RNA SERPL NAA+PROBE-LOG#: NORMAL LOG10COPY/ML
IMM GRANULOCYTES # BLD: 0 X10E3/UL (ref 0–0.1)
IMM GRANULOCYTES NFR BLD: 0 %
LYMPHOCYTES # BLD AUTO: 1.4 X10E3/UL (ref 0.7–3.1)
LYMPHOCYTES NFR BLD AUTO: 28 %
M TB IFN-G BLD-IMP: NEGATIVE
M TB IFN-G CD4+ BCKGRND COR BLD-ACNC: 0.05 IU/ML
MCH RBC QN AUTO: 32.7 PG (ref 26.6–33)
MCHC RBC AUTO-ENTMCNC: 33.9 G/DL (ref 31.5–35.7)
MCV RBC AUTO: 97 FL (ref 79–97)
MITOGEN IGNF BLD-ACNC: >10 IU/ML
MONOCYTES # BLD AUTO: 0.4 X10E3/UL (ref 0.1–0.9)
MONOCYTES NFR BLD AUTO: 7 %
NEUTROPHILS # BLD AUTO: 3.2 X10E3/UL (ref 1.4–7)
NEUTROPHILS NFR BLD AUTO: 63 %
PLATELET # BLD AUTO: 238 X10E3/UL (ref 150–379)
POTASSIUM SERPL-SCNC: 4.8 MMOL/L (ref 3.5–5.2)
PROT SERPL-MCNC: 7.3 G/DL (ref 6–8.5)
QUANTIFERON TB GOLD (INCUBATED): NORMAL
QUANTIFERON TB2 AG VALUE: 0.03 IU/ML
RBC # BLD AUTO: 5.14 X10E6/UL (ref 4.14–5.8)
RPR SER QL: NON REACTIVE
SERVICE CMNT-IMP: NORMAL
SODIUM SERPL-SCNC: 142 MMOL/L (ref 134–144)
WBC # BLD AUTO: 5.1 X10E3/UL (ref 3.4–10.8)

## 2019-01-03 NOTE — TELEPHONE ENCOUNTER
Magen from [facility] called states the patient is on pravachol 40 mg . The insurance will no longer pay for it wants to see if we can change the Medicaine .       Magen from [facility] [phone]

## 2019-01-03 NOTE — TELEPHONE ENCOUNTER
----- Message from Julissa Wilder MD sent at 1/2/2019  8:10 PM CST -----  Please let him know that his labs look good, including blood sugar and undetectable viral load

## 2019-01-03 NOTE — TELEPHONE ENCOUNTER
SPOKE WITH DAMARIS PHARMACIST WITH [employer].  PATIENT WAS REQUESTING 90 DAY SCRIPTS.  CHANGED TO 90 DAY SCRIPT WITH 2 REFILLS.

## 2019-01-07 DIAGNOSIS — B20 HUMAN IMMUNODEFICIENCY VIRUS (HIV) DISEASE: Primary | ICD-10-CM

## 2019-01-07 NOTE — TELEPHONE ENCOUNTER
SAID ATRIPLA WILL NO LONGER BE COVERED BY INSURANCE AND NEEDS A CHANGE IN MEDICATION.  HE STATED THAT BIKTARVY WAS FORMULARY.

## 2019-01-08 RX ORDER — EFAVIRENZ 600 MG/1
600 TABLET, FILM COATED ORAL NIGHTLY
Qty: 90 TABLET | Refills: 3 | Status: SHIPPED | OUTPATIENT
Start: 2019-01-08 | End: 2020-06-15 | Stop reason: SDUPTHER

## 2019-01-08 NOTE — TELEPHONE ENCOUNTER
Spoke with Yi at [facility] she said that Truvada, Descovy and efavirenz are covered, but will need the strength on the efavirenz.

## 2019-03-19 ENCOUNTER — OFFICE VISIT (OUTPATIENT)
Dept: FAMILY MEDICINE | Facility: CLINIC | Age: 59
End: 2019-03-19
Payer: COMMERCIAL

## 2019-03-19 VITALS
DIASTOLIC BLOOD PRESSURE: 78 MMHG | BODY MASS INDEX: 24.91 KG/M2 | HEIGHT: 66 IN | RESPIRATION RATE: 12 BRPM | WEIGHT: 155 LBS | OXYGEN SATURATION: 96 % | SYSTOLIC BLOOD PRESSURE: 130 MMHG | HEART RATE: 84 BPM | TEMPERATURE: 98 F

## 2019-03-19 DIAGNOSIS — Z00.00 WELLNESS EXAMINATION: Primary | ICD-10-CM

## 2019-03-19 DIAGNOSIS — Z00.00 ROUTINE GENERAL MEDICAL EXAMINATION AT A HEALTH CARE FACILITY: ICD-10-CM

## 2019-03-19 PROCEDURE — 99386 PREV VISIT NEW AGE 40-64: CPT | Mod: ,,, | Performed by: INTERNAL MEDICINE

## 2019-03-19 PROCEDURE — 99386 PR PREVENTIVE VISIT,NEW,40-64: ICD-10-PCS | Mod: ,,, | Performed by: INTERNAL MEDICINE

## 2019-03-19 NOTE — LETTER
March 19, 2019      Julissa Wilder MD  [address]  [address]  [place] [address]           [facility]  [address] [address]  Phone: [phone]  Fax: [phone]          Patient: Jones Germain   MR Number: [id number]   YOB: 1960   Date of Visit: 3/19/2019       Dear Dr. Julissa Wilder:    Thank you for referring Jones Germain to me for evaluation. Attached you will find relevant portions of my assessment and plan of care.    If you have questions, please do not hesitate to call me. I look forward to following Jones Germain along with you.    Sincerely,    Ninfa Ulloa MD    Enclosure  CC:  No Recipients    If you would like to receive this communication electronically, please contact [email or web address] or [phone] to request more information on [employer] access.    For providers and/or their staff who would like to refer a patient to [facility], please contact us through our one-stop-shop provider referral line, [facility], at [phone].    If you feel you have received this communication in error or would no longer like to receive these types of communications, please e-mail [email or web address]

## 2019-03-19 NOTE — PROGRESS NOTES
SUBJECTIVE:    Patient ID: Jones Germain is a 58 y.o. male.    Chief Complaint: Establish Care    HPI     Patient in to establish as a new patient. Has enjoyed good health-HIV positive since 1996 but doing extremely well and is followed by Dr Wilder.    No issues-never had a PCP before.     Labs reviewed-all here but lipids-        Office Visit on 12/27/2018   Component Date Value Ref Range Status    WBC 12/31/2018 5.1  3.4 - 10.8 x10E3/uL Final    RBC 12/31/2018 5.14  4.14 - 5.80 x10E6/uL Final    Hemoglobin 12/31/2018 16.8  13.0 - 17.7 g/dL Final    Hematocrit 12/31/2018 49.6  37.5 - 51.0 % Final    MCV 12/31/2018 97  79 - 97 fL Final    MCH 12/31/2018 32.7  26.6 - 33.0 pg Final    MCHC 12/31/2018 33.9  31.5 - 35.7 g/dL Final    RDW 12/31/2018 13.9  12.3 - 15.4 % Final    Platelets 12/31/2018 238  150 - 379 x10E3/uL Final    Neutrophils 12/31/2018 63  Not Estab. % Final    Lymph% 12/31/2018 28  Not Estab. % Final    Mono% 12/31/2018 7  Not Estab. % Final    Eosinophil% 12/31/2018 2  Not Estab. % Final    Basophil% 12/31/2018 0  Not Estab. % Final    Neutrophils Absolute 12/31/2018 3.2  1.4 - 7.0 x10E3/uL Final    Lymph # 12/31/2018 1.4  0.7 - 3.1 x10E3/uL Final    Mono # 12/31/2018 0.4  0.1 - 0.9 x10E3/uL Final    Eos # 12/31/2018 0.1  0.0 - 0.4 x10E3/uL Final    Baso # 12/31/2018 0.0  0.0 - 0.2 x10E3/uL Final    Immature Granulocytes 12/31/2018 0  Not Estab. % Final    Immature Grans (Abs) 12/31/2018 0.0  0.0 - 0.1 x10E3/uL Final    Glucose 12/31/2018 100* 65 - 99 mg/dL Final    BUN, Bld 12/31/2018 11  6 - 24 mg/dL Final    Creatinine 12/31/2018 0.92  0.76 - 1.27 mg/dL Final    eGFR if non African American 12/31/2018 91  >59 mL/min/1.73 Final    eGFR if African American 12/31/2018 105  >59 mL/min/1.73 Final    BUN/Creatinine Ratio 12/31/2018 12  9 - 20 Final    Sodium 12/31/2018 142  134 - 144 mmol/L Final    Potassium 12/31/2018 4.8  3.5 - 5.2 mmol/L Final    Chloride 12/31/2018  102  96 - 106 mmol/L Final    CO2 12/31/2018 23  20 - 29 mmol/L Final    Calcium 12/31/2018 9.6  8.7 - 10.2 mg/dL Final    Total Protein 12/31/2018 7.3  6.0 - 8.5 g/dL Final    Albumin 12/31/2018 5.1  3.5 - 5.5 g/dL Final    Globulin, Total 12/31/2018 2.2  1.5 - 4.5 g/dL Final    Albumin/Globulin Ratio 12/31/2018 2.3* 1.2 - 2.2 Final    Total Bilirubin 12/31/2018 0.3  0.0 - 1.2 mg/dL Final    Alkaline Phosphatase 12/31/2018 126* 39 - 117 IU/L Final    AST 12/31/2018 27  0 - 40 IU/L Final    ALT 12/31/2018 40  0 - 44 IU/L Final    HIV-1 RNA by PCR, Qn 12/31/2018 <20  copies/mL Final    log10 HIV-1 RNA 12/31/2018 CANCELED  [id number]/mL Final-Edited    Quantiferon TB Gold (Incubated) 12/31/2018 Incubation performed.   Final    QuantiFERON Criteria 12/31/2018 Comment   Final    QuantiFERON TB1 Ag Value 12/31/2018 0.05  IU/mL Final    QuantiFERON TB2 Ag Value 12/31/2018 0.03  IU/mL Final    Quantiferon Nil Value 12/31/2018 0.02  IU/mL Final    Quantiferon Mitogen Value 12/31/2018 >10.00  IU/mL Final    QuantiFERON-TB Gold Plus 12/31/2018 Negative  Negative Final    RPR 12/31/2018 Non Reactive  Non Reactive Final    Hemoglobin A1C 12/31/2018 5.1  4.8 - 5.6 % Final       Past Medical History:   Diagnosis Date    Adverse effect of hepatitis B vaccine     non responder    Hepatitis A 1991    High frequency hearing loss     HIV (human immunodeficiency virus infection) 1995    On COM/cRix until kidney stones, sallie 200    Hypercholesterolemia     Hyperlipidemia     Kidney stone 2005    [facility], Lithotripsy    Lipoatrophy     Mild    Pertussis     As a child    Prostate cancer     Secondary syphilis 2002    Smoker     Vertigo 02/2018    Due to Ear Infection     Past Surgical History:   Procedure Laterality Date    BIOPSY, PROSTATE, RECTAL APPROACH, WITH US GUIDANCE N/A 9/25/2018    Performed by Baljinder Johnson [facility] at [facility] OR     Family History   Problem Relation Age of Onset    Diabetes Mother           in a MVC    COPD Father     Lung cancer Paternal Grandmother        Marital Status: Single  Alcohol History:  reports that he does not drink alcohol.  Tobacco History:  reports that he has been smoking cigarettes.  He has never used smokeless tobacco.  Drug History:  reports that he does not use drugs.    Review of patient's allergies indicates:  No Known Allergies    Current Outpatient Medications:     aspirin 81 MG Chew, Take 81 mg by mouth once daily., Disp: , Rfl:     buPROPion (WELLBUTRIN XL) 150 MG TB24 tablet, Take 1 tablet (150 mg total) by mouth every morning., Disp: 30 tablet, Rfl: 6    efavirenz (SUSTIVA) 600 mg Tab, Take 1 tablet (600 mg total) by mouth every evening., Disp: 90 tablet, Rfl: 3    emtricitabine-tenofovir alafen (DESCOVY) 200-25 mg Tab, Take 1 tablet by mouth once daily., Disp: 90 tablet, Rfl: 3    pravastatin (PRAVACHOL) 40 MG tablet, Take 1 tablet (40 mg total) by mouth once daily., Disp: 30 tablet, Rfl: 6    Review of Systems   Constitutional: Negative for appetite change, chills, diaphoresis, fatigue, fever and unexpected weight change.   HENT: Negative for congestion, ear pain, hearing loss, nosebleeds, postnasal drip, sinus pressure, sinus pain, sneezing, sore throat, tinnitus and trouble swallowing.    Eyes: Negative for photophobia, pain and visual disturbance.   Respiratory: Negative for apnea, cough, choking, chest tightness, shortness of breath and wheezing.    Cardiovascular: Negative for chest pain, palpitations and leg swelling.   Gastrointestinal: Negative for abdominal distention, abdominal pain, blood in stool, constipation, diarrhea, nausea and vomiting.   Endocrine: Negative for cold intolerance, heat intolerance, polydipsia and polyuria.   Genitourinary: Negative for difficulty urinating, dysuria, flank pain, frequency, hematuria and urgency.        Sees  -prostate cancer   Musculoskeletal: Negative for arthralgias, back pain, joint swelling,  "myalgias, neck pain and neck stiffness.   Skin: Negative for pallor and rash.   Allergic/Immunologic: Negative for environmental allergies and food allergies.   Neurological: Negative for dizziness, tremors, seizures, syncope, speech difficulty, weakness, light-headedness, numbness and headaches.   Hematological: Does not bruise/bleed easily.   Psychiatric/Behavioral: Negative for agitation, confusion, decreased concentration, sleep disturbance and suicidal ideas. The patient is not nervous/anxious.           Objective:      Vitals:    03/19/19 1018   BP: 130/78   Pulse: 84   Resp: 12   Temp: 98.2 °F (36.8 °C)   SpO2: 96%   Weight: 70.3 kg (155 lb)   Height: 5' 6" (1.676 m)     Physical Exam   Constitutional: He is oriented to person, place, and time. He appears well-developed and well-nourished. He is cooperative. No distress.   HENT:   Head: Normocephalic and atraumatic.   Nose: Nose normal.   Mouth/Throat: Uvula is midline and mucous membranes are normal.   Eyes: Conjunctivae and EOM are normal. Pupils are equal, round, and reactive to light. Right eye exhibits no discharge. Left eye exhibits no discharge. No scleral icterus. Right pupil is round and reactive. Left pupil is round and reactive.   Neck: Normal range of motion. Neck supple. No JVD present. Carotid bruit is not present. No thyromegaly present.   Cardiovascular: Normal rate, regular rhythm, normal heart sounds and intact distal pulses. Exam reveals no gallop and no friction rub.   No murmur heard.  Pulmonary/Chest: Effort normal and breath sounds normal. He has no wheezes. He has no rales.   Abdominal: Soft. Bowel sounds are normal. He exhibits no distension, no abdominal bruit and no mass. There is no tenderness. There is no rigidity. No hernia.   Musculoskeletal: Normal range of motion. He exhibits no edema.   Bakers cyst left popliteal space   Neurological: He is alert and oriented to person, place, and time.   Skin: Skin is warm and dry. Capillary " refill takes less than 2 seconds. No lesion and no rash noted. No cyanosis. Nails show no clubbing.   Psychiatric: He has a normal mood and affect. His speech is normal and behavior is normal. Judgment and thought content normal.   Nursing note and vitals reviewed.        Assessment:       1. Wellness examination             Plan:       Wellness examination    Follow-up in about 1 year (around 3/19/2020) for yearly .        3/24/2019 Ninfa Ulloa M.D.

## 2019-04-25 ENCOUNTER — LAB VISIT (OUTPATIENT)
Dept: LAB | Facility: HOSPITAL | Age: 59
End: 2019-04-25
Attending: UROLOGY
Payer: COMMERCIAL

## 2019-04-25 ENCOUNTER — TELEPHONE (OUTPATIENT)
Dept: UROLOGY | Facility: CLINIC | Age: 59
End: 2019-04-25

## 2019-04-25 DIAGNOSIS — C61 PROSTATE CANCER: ICD-10-CM

## 2019-04-25 LAB — COMPLEXED PSA SERPL-MCNC: 2.7 NG/ML (ref 0–4)

## 2019-04-25 PROCEDURE — 36415 COLL VENOUS BLD VENIPUNCTURE: CPT

## 2019-04-25 PROCEDURE — 84153 ASSAY OF PSA TOTAL: CPT

## 2019-04-25 NOTE — TELEPHONE ENCOUNTER
----- Message from Sowmya Hargrove sent at 4/25/2019  8:11 AM CDT -----  Type:  Patient Returning Call    Who Called:  Leoncio  Who Left Message for Patient:  n/a  Does the patient know what this is regarding?:  labs  Best Call Back Number:  [phone] (home)

## 2019-04-26 ENCOUNTER — OFFICE VISIT (OUTPATIENT)
Dept: UROLOGY | Facility: CLINIC | Age: 59
End: 2019-04-26
Payer: COMMERCIAL

## 2019-04-26 VITALS
SYSTOLIC BLOOD PRESSURE: 155 MMHG | WEIGHT: 154.31 LBS | HEART RATE: 85 BPM | BODY MASS INDEX: 24.8 KG/M2 | DIASTOLIC BLOOD PRESSURE: 83 MMHG | HEIGHT: 66 IN | RESPIRATION RATE: 18 BRPM

## 2019-04-26 DIAGNOSIS — C61 PROSTATE CANCER: Primary | ICD-10-CM

## 2019-04-26 LAB
BILIRUB SERPL-MCNC: ABNORMAL MG/DL
BLOOD URINE, POC: ABNORMAL
COLOR, POC UA: YELLOW
GLUCOSE UR QL STRIP: ABNORMAL
KETONES UR QL STRIP: ABNORMAL
LEUKOCYTE ESTERASE URINE, POC: ABNORMAL
NITRITE, POC UA: ABNORMAL
PH, POC UA: 6.5
PROTEIN, POC: ABNORMAL
SPECIFIC GRAVITY, POC UA: 1.01
UROBILINOGEN, POC UA: 0.2

## 2019-04-26 PROCEDURE — 99215 PR OFFICE/OUTPT VISIT, EST, LEVL V, 40-54 MIN: ICD-10-PCS | Mod: 25,S$GLB,, | Performed by: UROLOGY

## 2019-04-26 PROCEDURE — 81002 POCT URINE DIPSTICK WITHOUT MICROSCOPE: ICD-10-PCS | Mod: S$GLB,,, | Performed by: UROLOGY

## 2019-04-26 PROCEDURE — 81002 URINALYSIS NONAUTO W/O SCOPE: CPT | Mod: S$GLB,,, | Performed by: UROLOGY

## 2019-04-26 PROCEDURE — 99999 PR PBB SHADOW E&M-EST. PATIENT-LVL IV: ICD-10-PCS | Mod: PBBFAC,,, | Performed by: UROLOGY

## 2019-04-26 PROCEDURE — 3008F PR BODY MASS INDEX (BMI) DOCUMENTED: ICD-10-PCS | Mod: CPTII,S$GLB,, | Performed by: UROLOGY

## 2019-04-26 PROCEDURE — 99215 OFFICE O/P EST HI 40 MIN: CPT | Mod: 25,S$GLB,, | Performed by: UROLOGY

## 2019-04-26 PROCEDURE — 87086 URINE CULTURE/COLONY COUNT: CPT

## 2019-04-26 PROCEDURE — 99999 PR PBB SHADOW E&M-EST. PATIENT-LVL IV: CPT | Mod: PBBFAC,,, | Performed by: UROLOGY

## 2019-04-26 PROCEDURE — 3008F BODY MASS INDEX DOCD: CPT | Mod: CPTII,S$GLB,, | Performed by: UROLOGY

## 2019-04-26 NOTE — PROGRESS NOTES
[facility] Urology Progress Note    Jones Germain is a 58 y.o. male who presents for prostate cancer follow up, on active surveillance     He has a history of HIV and has been on atripla for 25 years with undetectable viral, who was referred by Dr. Wilder after discovering prostate nodule on exam in July 2018  PSA 2.0. No famHx CaP  Has history of kidney stones - in 2006. Went to [facility] for lithotripsy at that time. Only the one episode  NTF 1-2. No urinary hesitancy. Rare intermittency. Feels like he empties his bladder.  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 Does drink afternoon/evening coffee  + smoker - 40 years at about 1 pack.  ERIBERTO: 20-25g, nontender, smooth symmetrical except for small <0.5cm firn nodule just to left of central sulcus     Prostate biopsy 9/25/18:  TRUS VOLUME:57.3cm3 (W 50.3mm, H 35.7mm, L 61mm)  SPECIMEN:    ULTRASOUND FINDINGS: + median lobe, hypoechoities in L SV, mildly heterogenous apices, extension at LA consistent with palpable nodule  PATHOLOGY: 5/15 cores aiden 3+3=6 CaP, largely left sided, and one core ASAP as below  RA lateral < 5%; RA medial 10% +PNI; RTZ asap; LM med <5%, LA lat 20%, LTZ 30%    I last saw him in October 2018 to discuss his pathology.  We did review all treatment options for prostate cancer, and he elected active surveillance at that time and was considering treatment in the future.  - specific to this patient we did discuss that although he has low risk Gleas  on 6 pathology, he does have palpable nodule, is young for prostate cancer at age 58 which could portend increased anxiety/expense/risks of further investigations on surveillance protocol, and may have more benefit from up-front treatment of prostate cancer albeit with noted side effects as discussed of treatment options    He returns today with psa of 2.7 on 4/25/19, up from 2.0 at time of diagnosis 6 months ago based on palpable nodule.  He also notes progression of his bothersome LUTS  AUA  "symptom score:  17/5, unhappy (4:  Weak stream, sleeping; 3:  Emptying, intermittency; 2:  Frequency; 1:  Straining)  Urge to urinate is worse, and now 4x at night. Doesn't always stop fluids 2h before bed.  Urinalysis dipstick with trace blood and trace leukocytes.  He would like to proceed with prostatectomy.  Good baseline erections.    ROS: A comprehensive 10 system review was performed and is negative except as noted above in HPI    PHYSICAL EXAM:    Vitals:    04/26/19 0929   BP: (!) 155/83   Pulse: 85   Resp: 18     Body mass index is 24.91 kg/m². Weight: 70 kg (154 lb 5.2 oz) Height: 5' 6" (167.6 cm)       General: Alert, cooperative, no distress, appears stated age   Head: Normocephalic, without obvious abnormality, atraumatic   Eyes: PERRL, conjunctiva/corneas clear   Lungs: Respirations unlabored   Heart: Warm and well perfused   Abdomen: soft NT ND  Extremities: Extremities normal, atraumatic, no cyanosis or edema   Skin: Skin color, texture, turgor normal, no rashes or lesions   Psych: Appropriate   Neurologic: Non-focal       Recent Results (from the past 336 hour(s))   PROSTATE SPECIFIC ANTIGEN, DIAGNOSTIC    Collection Time: 04/25/19  8:41 AM   Result Value Ref Range    PSA DIAGNOSTIC 2.7 0.00 - 4.00 ng/mL   POCT URINE DIPSTICK WITHOUT MICROSCOPE    Collection Time: 04/26/19  9:34 AM   Result Value Ref Range    Color, UA yellow     Spec Grav UA 1.015     pH, UA 6.5     WBC, UA trace     Nitrite, UA neg     Protein neg     Glucose, UA neg     Ketones, UA neg     Urobilinogen, UA 0.2     Bilirubin neg     Blood, UA trace        ASSESSMENT   1. Prostate cancer  POCT URINE DIPSTICK WITHOUT MICROSCOPE    Diet NPO    Case Request Operating Room: ROBOTIC PROSTATECTOMY, LYMPHADENECTOMY, pelvic    Admit to Inpatient    Insert peripheral IV    Reason for No Pharmacological VTE Prophylaxis    Place sequential compression device    Place RAF hose    Basic metabolic panel    CBC auto differential    Protime-INR "    Urinalysis    Urine culture    Type And Screen Preop    EKG 12-lead    X-Ray Chest PA And Lateral    Urine culture       Plan    At this time he is interested in surgical extirpative therapy after careful consideration of all treatment options, and has elected to proceed with robot-assisted laparoscopic radical prostatectomy.  He has a clinically palpable nodule with cT2 a disease which prompted diagnosis at a PSA of 2, which has increased to 2.7 in the last 6 months.  He has also had progression of his lower urinary tract symptoms.    The procedure in general including hospital stay and postoperative recovery process were discussed in detail. Reviewed hospital stay, CARLOTTA drain and jones management, reminding him the jones will remain indwelling at minimum 7-10 days during which time he should refrain from driving. Risks of surgery were discussed including but not limited to up-front urinary incontinence and erectile dysfunction which we will work to overcome with kegel excercises and any number of ED therapies. We discussed concurrent possible bilateral pelvic lymphadenectomy with surgery, and risks associated, as is not mandated in [place] 6 disease with low nomogram risk of lymph node involvement. We did also discuss nerve sparing status based on pathology:  Given Franca 6 disease, nerve-sparing is appropriate, however given clinically palpable disease on right with noted perineural invasion on biopsy would consider wider it excision here versus modified nerve spare, with left nerve-sparing vs modified nerve-sparing as well.  Detailed discussion about timeline of recovery of erectile function, and potential to not recover spontaneous erectile function.  We also discussed implications of known median lobe on bladder neck and anastomosis and potential need for bladder neck reconstruction All questions answered.  Risks of surgery were discussed including but not limited to urinary incontinence, erectile  dysfunction, injury to intraabdominal structures, urine leak, anastamotic leak, rectal injury, damage to ureters, hernia, postoperative ileus.  The need for monitoring his PSA postoperatively was also discussed with the patient. Any adjuvant treatment, including hormonal and/or radiation treatment may be dependent on his final pathology report, including final aiden score, margin status, extracapsular invasion, or seminal vesicle invasion. Such adjuvant therapies may also be necessary in the case of postoperative psa rise.     Robotic radical prostatectomy scheduled on 5/29/19  All questions were answered and appropriate informed consent was obtained.     I did instruct the patient on kegel exercises today and advised to practice them preoperatively to strengthen his pelvic floor muscles prior to surgery to facilitate postoperative return of continence.  He reports that he has only seen PCP Dr. Ulloa 1 time at the referral of Dr. Wilder, who has been following him for years, so will CC Dr Wilder regarding any preop optimization recommendations, especially as it relates to his antiretrovirals and any associated perior concerns or adjustments.    As well, in interim, offered to begin medical therapy for his bothersome lower urinary tract symptoms but he denied at this time and will manage conservatively until surgery.  Advised to discontinue fluid intake 2 hr before bed  Discussed conservative measures to control urgency and frequency including avoiding bladder irritants, timed voiding, not postponing voiding, and bowel regimen (as distended bowel has extrinsic compressive effect on bladder. Discussed bladder irritants include coffe (even decaf), tea, alcohol, soda, spicy foods, acidic juices (orange, tomato), vinegar, and artificial sweeteners.  Also discuss the implications of urgency, and worsening urgency/frequency after relief of prostatic obstruction, and the most severe case manifesting as urge  incontinence which may con found his recovery of stress urinary incontinence and will be assessed closely in the postoperative period.     45 mins spent in encounter, over half in counseling

## 2019-04-26 NOTE — Clinical Note
Thanks for the referral - again nice pickup on the nodule.PSA slowly rising and LUTS worsening and has elected robotic prostatectomy which will take place 5/29He reports that he has only seen PCP Dr. Ulloa 1 time at the referral of Dr. Wilder, who has been following him for years, so will CC Dr Wilder regarding any preop optimization recommendations, especially as it relates to his antiretrovirals and any associated perior concerns or adjustments.

## 2019-04-27 LAB — BACTERIA UR CULT: NO GROWTH

## 2019-05-16 ENCOUNTER — HOSPITAL ENCOUNTER (OUTPATIENT)
Dept: RADIOLOGY | Facility: HOSPITAL | Age: 59
Discharge: HOME | End: 2019-05-16
Attending: UROLOGY
Payer: COMMERCIAL

## 2019-05-16 ENCOUNTER — HOSPITAL ENCOUNTER (OUTPATIENT)
Dept: PREADMISSION TESTING | Facility: HOSPITAL | Age: 59
Discharge: HOME | End: 2019-05-16
Attending: UROLOGY
Payer: COMMERCIAL

## 2019-05-16 VITALS — WEIGHT: 159 LBS | BODY MASS INDEX: 25.55 KG/M2 | HEIGHT: 66 IN

## 2019-05-16 DIAGNOSIS — C61 PROSTATE CANCER: ICD-10-CM

## 2019-05-16 PROCEDURE — 93010 ELECTROCARDIOGRAM REPORT: CPT | Mod: ,,, | Performed by: INTERNAL MEDICINE

## 2019-05-16 PROCEDURE — 71046 XR CHEST PA AND LATERAL: ICD-10-PCS | Mod: 26,,, | Performed by: RADIOLOGY

## 2019-05-16 PROCEDURE — 93005 ELECTROCARDIOGRAM TRACING: CPT

## 2019-05-16 PROCEDURE — 99900103 DSU ONLY-NO CHARGE-INITIAL HR (STAT)

## 2019-05-16 PROCEDURE — 71046 X-RAY EXAM CHEST 2 VIEWS: CPT | Mod: 26,,, | Performed by: RADIOLOGY

## 2019-05-16 PROCEDURE — 93010 EKG 12-LEAD: ICD-10-PCS | Mod: ,,, | Performed by: INTERNAL MEDICINE

## 2019-05-16 PROCEDURE — 99900104 DSU ONLY-NO CHARGE-EA ADD'L HR (STAT)

## 2019-05-16 PROCEDURE — 71046 X-RAY EXAM CHEST 2 VIEWS: CPT | Mod: TC,FY

## 2019-05-16 NOTE — DISCHARGE INSTRUCTIONS

## 2019-05-28 ENCOUNTER — ANESTHESIA EVENT (OUTPATIENT)
Dept: SURGERY | Facility: HOSPITAL | Age: 59
DRG: 708 | End: 2019-05-28
Payer: COMMERCIAL

## 2019-05-29 ENCOUNTER — HOSPITAL ENCOUNTER (INPATIENT)
Facility: HOSPITAL | Age: 59
LOS: 1 days | Discharge: HOME | DRG: 708 | End: 2019-05-30
Attending: UROLOGY | Admitting: UROLOGY
Payer: COMMERCIAL

## 2019-05-29 ENCOUNTER — ANESTHESIA (OUTPATIENT)
Dept: SURGERY | Facility: HOSPITAL | Age: 59
DRG: 708 | End: 2019-05-29
Payer: COMMERCIAL

## 2019-05-29 DIAGNOSIS — C61 PROSTATE CANCER: ICD-10-CM

## 2019-05-29 LAB
ANION GAP SERPL CALC-SCNC: 11 MMOL/L (ref 8–16)
BASOPHILS # BLD AUTO: 0 K/UL (ref 0–0.2)
BASOPHILS NFR BLD: 0.1 % (ref 0–1.9)
BUN SERPL-MCNC: 10 MG/DL (ref 6–20)
CALCIUM SERPL-MCNC: 8.4 MG/DL (ref 8.7–10.5)
CHLORIDE SERPL-SCNC: 106 MMOL/L (ref 95–110)
CO2 SERPL-SCNC: 21 MMOL/L (ref 23–29)
CREAT SERPL-MCNC: 0.9 MG/DL (ref 0.5–1.4)
DIFFERENTIAL METHOD: ABNORMAL
EOSINOPHIL # BLD AUTO: 0 K/UL (ref 0–0.5)
EOSINOPHIL NFR BLD: 0.1 % (ref 0–8)
ERYTHROCYTE [DISTWIDTH] IN BLOOD BY AUTOMATED COUNT: 14.3 % (ref 11.5–14.5)
EST. GFR  (AFRICAN AMERICAN): >60 ML/MIN/1.73 M^2
EST. GFR  (NON AFRICAN AMERICAN): >60 ML/MIN/1.73 M^2
GLUCOSE SERPL-MCNC: 169 MG/DL (ref 70–110)
HCT VFR BLD AUTO: 42.3 % (ref 40–54)
HGB BLD-MCNC: 14.4 G/DL (ref 14–18)
LYMPHOCYTES # BLD AUTO: 0.7 K/UL (ref 1–4.8)
LYMPHOCYTES NFR BLD: 4.9 % (ref 18–48)
MCH RBC QN AUTO: 32.2 PG (ref 27–31)
MCHC RBC AUTO-ENTMCNC: 33.9 G/DL (ref 32–36)
MCV RBC AUTO: 95 FL (ref 82–98)
MONOCYTES # BLD AUTO: 0.7 K/UL (ref 0.3–1)
MONOCYTES NFR BLD: 5.2 % (ref 4–15)
NEUTROPHILS # BLD AUTO: 12.5 K/UL (ref 1.8–7.7)
NEUTROPHILS NFR BLD: 89.7 % (ref 38–73)
PLATELET # BLD AUTO: 211 K/UL (ref 150–350)
PMV BLD AUTO: 7.4 FL (ref 9.2–12.9)
POTASSIUM SERPL-SCNC: 4.5 MMOL/L (ref 3.5–5.1)
RBC # BLD AUTO: 4.45 M/UL (ref 4.6–6.2)
SODIUM SERPL-SCNC: 138 MMOL/L (ref 136–145)
WBC # BLD AUTO: 14 K/UL (ref 3.9–12.7)

## 2019-05-29 PROCEDURE — S5010 5% DEXTROSE AND 0.45% SALINE: HCPCS | Performed by: UROLOGY

## 2019-05-29 PROCEDURE — 27000221 HC OXYGEN, UP TO 24 HOURS

## 2019-05-29 PROCEDURE — 25000003 PHARM REV CODE 250: Performed by: UROLOGY

## 2019-05-29 PROCEDURE — 37000008 HC ANESTHESIA 1ST 15 MINUTES: Performed by: UROLOGY

## 2019-05-29 PROCEDURE — 88307 TISSUE SPECIMEN TO PATHOLOGY - SURGERY: ICD-10-PCS | Mod: 26,,, | Performed by: PATHOLOGY

## 2019-05-29 PROCEDURE — 71000039 HC RECOVERY, EACH ADD'L HOUR: Performed by: UROLOGY

## 2019-05-29 PROCEDURE — 71000033 HC RECOVERY, INTIAL HOUR: Performed by: UROLOGY

## 2019-05-29 PROCEDURE — 25000003 PHARM REV CODE 250: Performed by: ANESTHESIOLOGY

## 2019-05-29 PROCEDURE — D9220A PRA ANESTHESIA: ICD-10-PCS | Mod: CRNA,,, | Performed by: NURSE ANESTHETIST, CERTIFIED REGISTERED

## 2019-05-29 PROCEDURE — 37000009 HC ANESTHESIA EA ADD 15 MINS: Performed by: UROLOGY

## 2019-05-29 PROCEDURE — 27201423 OPTIME MED/SURG SUP & DEVICES STERILE SUPPLY: Performed by: UROLOGY

## 2019-05-29 PROCEDURE — D9220A PRA ANESTHESIA: Mod: ANES,,, | Performed by: ANESTHESIOLOGY

## 2019-05-29 PROCEDURE — 36000712 HC OR TIME LEV V 1ST 15 MIN: Performed by: UROLOGY

## 2019-05-29 PROCEDURE — 55866 LAPS SURG PRST8ECT RPBIC RAD: CPT | Mod: 22,,, | Performed by: UROLOGY

## 2019-05-29 PROCEDURE — 63600175 PHARM REV CODE 636 W HCPCS: Performed by: UROLOGY

## 2019-05-29 PROCEDURE — 38571 PR LAP,PELVIC LYMPHADENECTOMY: ICD-10-PCS | Mod: 51,,, | Performed by: UROLOGY

## 2019-05-29 PROCEDURE — 12000002 HC ACUTE/MED SURGE SEMI-PRIVATE ROOM

## 2019-05-29 PROCEDURE — 88307 TISSUE EXAM BY PATHOLOGIST: CPT | Mod: 26,,, | Performed by: PATHOLOGY

## 2019-05-29 PROCEDURE — 63600175 PHARM REV CODE 636 W HCPCS: Performed by: NURSE ANESTHETIST, CERTIFIED REGISTERED

## 2019-05-29 PROCEDURE — 25000003 PHARM REV CODE 250: Performed by: NURSE ANESTHETIST, CERTIFIED REGISTERED

## 2019-05-29 PROCEDURE — 88305 TISSUE SPECIMEN TO PATHOLOGY - SURGERY: ICD-10-PCS | Mod: 26,,, | Performed by: PATHOLOGY

## 2019-05-29 PROCEDURE — 94761 N-INVAS EAR/PLS OXIMETRY MLT: CPT

## 2019-05-29 PROCEDURE — 88305 TISSUE EXAM BY PATHOLOGIST: CPT | Mod: 26,,, | Performed by: PATHOLOGY

## 2019-05-29 PROCEDURE — D9220A PRA ANESTHESIA: Mod: CRNA,,, | Performed by: NURSE ANESTHETIST, CERTIFIED REGISTERED

## 2019-05-29 PROCEDURE — 36000713 HC OR TIME LEV V EA ADD 15 MIN: Performed by: UROLOGY

## 2019-05-29 PROCEDURE — D9220A PRA ANESTHESIA: ICD-10-PCS | Mod: ANES,,, | Performed by: ANESTHESIOLOGY

## 2019-05-29 PROCEDURE — 36415 COLL VENOUS BLD VENIPUNCTURE: CPT

## 2019-05-29 PROCEDURE — 99900104 DSU ONLY-NO CHARGE-EA ADD'L HR (STAT): Performed by: UROLOGY

## 2019-05-29 PROCEDURE — 80048 BASIC METABOLIC PNL TOTAL CA: CPT

## 2019-05-29 PROCEDURE — 55866 PR LAP,PROSTATECTOMY,RADICAL,W/NERVE SPARE,INCL ROBOTIC: ICD-10-PCS | Mod: 22,,, | Performed by: UROLOGY

## 2019-05-29 PROCEDURE — 99900035 HC TECH TIME PER 15 MIN (STAT)

## 2019-05-29 PROCEDURE — 38571 LAPAROSCOPY LYMPHADENECTOMY: CPT | Mod: 51,,, | Performed by: UROLOGY

## 2019-05-29 PROCEDURE — 88309 TISSUE EXAM BY PATHOLOGIST: CPT | Mod: 26,,, | Performed by: PATHOLOGY

## 2019-05-29 PROCEDURE — 88305 TISSUE EXAM BY PATHOLOGIST: CPT | Performed by: PATHOLOGY

## 2019-05-29 PROCEDURE — 88309 TISSUE SPECIMEN TO PATHOLOGY - SURGERY: ICD-10-PCS | Mod: 26,,, | Performed by: PATHOLOGY

## 2019-05-29 PROCEDURE — C1729 CATH, DRAINAGE: HCPCS | Performed by: UROLOGY

## 2019-05-29 PROCEDURE — 85025 COMPLETE CBC W/AUTO DIFF WBC: CPT

## 2019-05-29 PROCEDURE — 99900103 DSU ONLY-NO CHARGE-INITIAL HR (STAT): Performed by: UROLOGY

## 2019-05-29 RX ORDER — EFAVIRENZ 600 MG/1
600 TABLET, FILM COATED ORAL NIGHTLY
Status: DISCONTINUED | OUTPATIENT
Start: 2019-05-29 | End: 2019-05-30 | Stop reason: HOSPADM

## 2019-05-29 RX ORDER — SODIUM CHLORIDE, SODIUM LACTATE, POTASSIUM CHLORIDE, CALCIUM CHLORIDE 600; 310; 30; 20 MG/100ML; MG/100ML; MG/100ML; MG/100ML
INJECTION, SOLUTION INTRAVENOUS CONTINUOUS
Status: DISCONTINUED | OUTPATIENT
Start: 2019-05-29 | End: 2019-05-29

## 2019-05-29 RX ORDER — PROPOFOL 10 MG/ML
VIAL (ML) INTRAVENOUS
Status: DISCONTINUED | OUTPATIENT
Start: 2019-05-29 | End: 2019-05-29

## 2019-05-29 RX ORDER — NEOSTIGMINE METHYLSULFATE 1 MG/ML
INJECTION, SOLUTION INTRAVENOUS
Status: DISCONTINUED | OUTPATIENT
Start: 2019-05-29 | End: 2019-05-29

## 2019-05-29 RX ORDER — POLYETHYLENE GLYCOL 3350 17 G/17G
17 POWDER, FOR SOLUTION ORAL DAILY
Status: DISCONTINUED | OUTPATIENT
Start: 2019-05-30 | End: 2019-05-30 | Stop reason: HOSPADM

## 2019-05-29 RX ORDER — VECURONIUM BROMIDE FOR INJECTION 1 MG/ML
INJECTION, POWDER, LYOPHILIZED, FOR SOLUTION INTRAVENOUS
Status: DISCONTINUED | OUTPATIENT
Start: 2019-05-29 | End: 2019-05-29

## 2019-05-29 RX ORDER — LIDOCAINE HCL/PF 100 MG/5ML
SYRINGE (ML) INTRAVENOUS
Status: DISCONTINUED | OUTPATIENT
Start: 2019-05-29 | End: 2019-05-29

## 2019-05-29 RX ORDER — ACETAMINOPHEN 325 MG/1
650 TABLET ORAL EVERY 4 HOURS PRN
Status: DISCONTINUED | OUTPATIENT
Start: 2019-05-29 | End: 2019-05-30 | Stop reason: HOSPADM

## 2019-05-29 RX ORDER — ONDANSETRON 2 MG/ML
4 INJECTION INTRAMUSCULAR; INTRAVENOUS EVERY 4 HOURS PRN
Status: DISCONTINUED | OUTPATIENT
Start: 2019-05-29 | End: 2019-05-30 | Stop reason: HOSPADM

## 2019-05-29 RX ORDER — DOCUSATE SODIUM 100 MG/1
100 CAPSULE, LIQUID FILLED ORAL 2 TIMES DAILY
Status: DISCONTINUED | OUTPATIENT
Start: 2019-05-29 | End: 2019-05-30 | Stop reason: HOSPADM

## 2019-05-29 RX ORDER — ACETAMINOPHEN 10 MG/ML
1000 INJECTION, SOLUTION INTRAVENOUS EVERY 8 HOURS
Status: COMPLETED | OUTPATIENT
Start: 2019-05-29 | End: 2019-05-30

## 2019-05-29 RX ORDER — ACETAMINOPHEN 10 MG/ML
INJECTION, SOLUTION INTRAVENOUS
Status: DISCONTINUED | OUTPATIENT
Start: 2019-05-29 | End: 2019-05-29

## 2019-05-29 RX ORDER — DEXTROSE MONOHYDRATE AND SODIUM CHLORIDE 5; .45 G/100ML; G/100ML
INJECTION, SOLUTION INTRAVENOUS CONTINUOUS
Status: DISCONTINUED | OUTPATIENT
Start: 2019-05-29 | End: 2019-05-30 | Stop reason: HOSPADM

## 2019-05-29 RX ORDER — KETOROLAC TROMETHAMINE 30 MG/ML
INJECTION, SOLUTION INTRAMUSCULAR; INTRAVENOUS
Status: DISCONTINUED | OUTPATIENT
Start: 2019-05-29 | End: 2019-05-29

## 2019-05-29 RX ORDER — PRAVASTATIN SODIUM 40 MG/1
40 TABLET ORAL DAILY
Status: DISCONTINUED | OUTPATIENT
Start: 2019-05-30 | End: 2019-05-30 | Stop reason: HOSPADM

## 2019-05-29 RX ORDER — GLYCOPYRROLATE 0.2 MG/ML
INJECTION INTRAMUSCULAR; INTRAVENOUS
Status: DISCONTINUED | OUTPATIENT
Start: 2019-05-29 | End: 2019-05-29

## 2019-05-29 RX ORDER — CEFAZOLIN SODIUM 1 G/3ML
INJECTION, POWDER, FOR SOLUTION INTRAMUSCULAR; INTRAVENOUS
Status: DISCONTINUED | OUTPATIENT
Start: 2019-05-29 | End: 2019-05-29

## 2019-05-29 RX ORDER — SUFENTANIL CITRATE 50 UG/ML
INJECTION EPIDURAL; INTRAVENOUS
Status: DISCONTINUED | OUTPATIENT
Start: 2019-05-29 | End: 2019-05-29

## 2019-05-29 RX ORDER — HEPARIN SODIUM 10000 [USP'U]/ML
INJECTION, SOLUTION INTRAVENOUS; SUBCUTANEOUS
Status: DISCONTINUED | OUTPATIENT
Start: 2019-05-29 | End: 2019-05-29 | Stop reason: HOSPADM

## 2019-05-29 RX ORDER — KETOROLAC TROMETHAMINE 30 MG/ML
15 INJECTION, SOLUTION INTRAMUSCULAR; INTRAVENOUS EVERY 6 HOURS
Status: DISCONTINUED | OUTPATIENT
Start: 2019-05-30 | End: 2019-05-30 | Stop reason: HOSPADM

## 2019-05-29 RX ORDER — CEFAZOLIN SODIUM 2 G/50ML
2 SOLUTION INTRAVENOUS
Status: COMPLETED | OUTPATIENT
Start: 2019-05-29 | End: 2019-05-29

## 2019-05-29 RX ORDER — ONDANSETRON 2 MG/ML
INJECTION INTRAMUSCULAR; INTRAVENOUS
Status: DISCONTINUED | OUTPATIENT
Start: 2019-05-29 | End: 2019-05-29

## 2019-05-29 RX ORDER — MIDAZOLAM HYDROCHLORIDE 1 MG/ML
INJECTION, SOLUTION INTRAMUSCULAR; INTRAVENOUS
Status: DISCONTINUED | OUTPATIENT
Start: 2019-05-29 | End: 2019-05-29

## 2019-05-29 RX ORDER — CEFAZOLIN SODIUM 2 G/50ML
2 SOLUTION INTRAVENOUS
Status: COMPLETED | OUTPATIENT
Start: 2019-05-29 | End: 2019-05-30

## 2019-05-29 RX ORDER — SODIUM CHLORIDE, SODIUM LACTATE, POTASSIUM CHLORIDE, CALCIUM CHLORIDE 600; 310; 30; 20 MG/100ML; MG/100ML; MG/100ML; MG/100ML
125 INJECTION, SOLUTION INTRAVENOUS CONTINUOUS
Status: DISCONTINUED | OUTPATIENT
Start: 2019-05-29 | End: 2019-05-29

## 2019-05-29 RX ORDER — OXYCODONE HYDROCHLORIDE 5 MG/1
5 TABLET ORAL ONCE AS NEEDED
Status: DISCONTINUED | OUTPATIENT
Start: 2019-05-29 | End: 2019-05-29 | Stop reason: HOSPADM

## 2019-05-29 RX ORDER — SIMETHICONE 80 MG
1 TABLET,CHEWABLE ORAL 3 TIMES DAILY
Status: DISCONTINUED | OUTPATIENT
Start: 2019-05-29 | End: 2019-05-30 | Stop reason: HOSPADM

## 2019-05-29 RX ORDER — MORPHINE SULFATE 2 MG/ML
2 INJECTION, SOLUTION INTRAMUSCULAR; INTRAVENOUS EVERY 4 HOURS PRN
Status: DISCONTINUED | OUTPATIENT
Start: 2019-05-29 | End: 2019-05-30 | Stop reason: HOSPADM

## 2019-05-29 RX ORDER — FENTANYL CITRATE 50 UG/ML
25 INJECTION, SOLUTION INTRAMUSCULAR; INTRAVENOUS EVERY 5 MIN PRN
Status: DISCONTINUED | OUTPATIENT
Start: 2019-05-29 | End: 2019-05-29 | Stop reason: HOSPADM

## 2019-05-29 RX ORDER — ROCURONIUM BROMIDE 10 MG/ML
INJECTION, SOLUTION INTRAVENOUS
Status: DISCONTINUED | OUTPATIENT
Start: 2019-05-29 | End: 2019-05-29

## 2019-05-29 RX ORDER — TRAMADOL HYDROCHLORIDE 50 MG/1
50 TABLET ORAL EVERY 6 HOURS PRN
Status: DISCONTINUED | OUTPATIENT
Start: 2019-05-29 | End: 2019-05-30 | Stop reason: HOSPADM

## 2019-05-29 RX ORDER — LIDOCAINE HYDROCHLORIDE 10 MG/ML
0.5 INJECTION, SOLUTION EPIDURAL; INFILTRATION; INTRACAUDAL; PERINEURAL ONCE
Status: DISCONTINUED | OUTPATIENT
Start: 2019-05-29 | End: 2019-05-29 | Stop reason: HOSPADM

## 2019-05-29 RX ORDER — DEXAMETHASONE SODIUM PHOSPHATE 4 MG/ML
INJECTION, SOLUTION INTRA-ARTICULAR; INTRALESIONAL; INTRAMUSCULAR; INTRAVENOUS; SOFT TISSUE
Status: DISCONTINUED | OUTPATIENT
Start: 2019-05-29 | End: 2019-05-29

## 2019-05-29 RX ORDER — BUPROPION HYDROCHLORIDE 150 MG/1
150 TABLET ORAL EVERY MORNING
Status: DISCONTINUED | OUTPATIENT
Start: 2019-05-30 | End: 2019-05-30 | Stop reason: HOSPADM

## 2019-05-29 RX ORDER — FAMOTIDINE 20 MG/1
20 TABLET, FILM COATED ORAL 2 TIMES DAILY
Status: DISCONTINUED | OUTPATIENT
Start: 2019-05-29 | End: 2019-05-30 | Stop reason: HOSPADM

## 2019-05-29 RX ORDER — ONDANSETRON 2 MG/ML
4 INJECTION INTRAMUSCULAR; INTRAVENOUS ONCE AS NEEDED
Status: DISCONTINUED | OUTPATIENT
Start: 2019-05-29 | End: 2019-05-29 | Stop reason: HOSPADM

## 2019-05-29 RX ADMIN — NEOSTIGMINE METHYLSULFATE 4 MG: 1 INJECTION INTRAVENOUS at 06:05

## 2019-05-29 RX ADMIN — TRAMADOL HYDROCHLORIDE 50 MG: 50 TABLET ORAL at 08:05

## 2019-05-29 RX ADMIN — CEFAZOLIN 1 G: 1 INJECTION, POWDER, FOR SOLUTION INTRAVENOUS at 05:05

## 2019-05-29 RX ADMIN — SODIUM CHLORIDE, SODIUM LACTATE, POTASSIUM CHLORIDE, AND CALCIUM CHLORIDE: .6; .31; .03; .02 INJECTION, SOLUTION INTRAVENOUS at 05:05

## 2019-05-29 RX ADMIN — FAMOTIDINE 20 MG: 20 TABLET, FILM COATED ORAL at 10:05

## 2019-05-29 RX ADMIN — ACETAMINOPHEN 1000 MG: 10 INJECTION, SOLUTION INTRAVENOUS at 10:05

## 2019-05-29 RX ADMIN — ACETAMINOPHEN 1000 MG: 10 INJECTION, SOLUTION INTRAVENOUS at 02:05

## 2019-05-29 RX ADMIN — PROPOFOL 130 MG: 10 INJECTION, EMULSION INTRAVENOUS at 01:05

## 2019-05-29 RX ADMIN — VECURONIUM BROMIDE FOR INJECTION 3 MG: 1 INJECTION, POWDER, LYOPHILIZED, FOR SOLUTION INTRAVENOUS at 03:05

## 2019-05-29 RX ADMIN — SODIUM CHLORIDE, SODIUM LACTATE, POTASSIUM CHLORIDE, AND CALCIUM CHLORIDE: .6; .31; .03; .02 INJECTION, SOLUTION INTRAVENOUS at 11:05

## 2019-05-29 RX ADMIN — ROCURONIUM BROMIDE 10 MG: 10 INJECTION, SOLUTION INTRAVENOUS at 03:05

## 2019-05-29 RX ADMIN — VECURONIUM BROMIDE FOR INJECTION 2 MG: 1 INJECTION, POWDER, LYOPHILIZED, FOR SOLUTION INTRAVENOUS at 05:05

## 2019-05-29 RX ADMIN — SIMETHICONE CHEW TAB 80 MG 80 MG: 80 TABLET ORAL at 07:05

## 2019-05-29 RX ADMIN — ROCURONIUM BROMIDE 50 MG: 10 INJECTION, SOLUTION INTRAVENOUS at 01:05

## 2019-05-29 RX ADMIN — ROCURONIUM BROMIDE 20 MG: 10 INJECTION, SOLUTION INTRAVENOUS at 03:05

## 2019-05-29 RX ADMIN — MIDAZOLAM 3 MG: 1 INJECTION INTRAMUSCULAR; INTRAVENOUS at 01:05

## 2019-05-29 RX ADMIN — GLYCOPYRROLATE 0.6 MG: 0.2 INJECTION, SOLUTION INTRAMUSCULAR; INTRAVENOUS at 06:05

## 2019-05-29 RX ADMIN — SUFENTANIL CITRATE 20 MCG: 50 INJECTION EPIDURAL; INTRAVENOUS at 01:05

## 2019-05-29 RX ADMIN — MIDAZOLAM 2 MG: 1 INJECTION INTRAMUSCULAR; INTRAVENOUS at 01:05

## 2019-05-29 RX ADMIN — DOCUSATE SODIUM 100 MG: 100 CAPSULE, LIQUID FILLED ORAL at 10:05

## 2019-05-29 RX ADMIN — DEXAMETHASONE SODIUM PHOSPHATE 8 MG: 4 INJECTION, SOLUTION INTRAMUSCULAR; INTRAVENOUS at 06:05

## 2019-05-29 RX ADMIN — LIDOCAINE HYDROCHLORIDE 75 MG: 20 INJECTION, SOLUTION INTRAVENOUS at 01:05

## 2019-05-29 RX ADMIN — SUFENTANIL CITRATE 0.3 MCG/KG/HR: 50 INJECTION EPIDURAL; INTRAVENOUS at 01:05

## 2019-05-29 RX ADMIN — ROCURONIUM BROMIDE 20 MG: 10 INJECTION, SOLUTION INTRAVENOUS at 02:05

## 2019-05-29 RX ADMIN — DEXTROSE AND SODIUM CHLORIDE: 5; .45 INJECTION, SOLUTION INTRAVENOUS at 07:05

## 2019-05-29 RX ADMIN — CEFAZOLIN SODIUM 2 G: 2 SOLUTION INTRAVENOUS at 01:05

## 2019-05-29 RX ADMIN — ONDANSETRON 4 MG: 2 INJECTION, SOLUTION INTRAMUSCULAR; INTRAVENOUS at 06:05

## 2019-05-29 RX ADMIN — KETOROLAC TROMETHAMINE 30 MG: 30 INJECTION, SOLUTION INTRAMUSCULAR; INTRAVENOUS at 06:05

## 2019-05-29 RX ADMIN — CEFAZOLIN SODIUM 2 G: 2 SOLUTION INTRAVENOUS at 08:05

## 2019-05-29 NOTE — ANESTHESIA PREPROCEDURE EVALUATION
05/29/2019  Jones Germain is a 59 y.o., male.    Anesthesia Evaluation    I have reviewed the Patient Summary Reports.    I have reviewed the Nursing Notes.   I have reviewed the Medications.     Review of Systems  Anesthesia Hx:  No problems with previous Anesthesia    Social:  Smoker    Hematology/Oncology:        Hematology Comments: HIV Current/Recent Cancer. Other (see Oncology comments)   Cardiovascular:   hyperlipidemia    Renal/:   Chronic Renal Disease    Hepatic/GI:   Liver Disease, Hepatitis, A    Musculoskeletal:  Musculoskeletal Normal    Neurological:  Neurology Normal    Dermatological:  Skin Normal    Psych:  Psychiatric Normal           Physical Exam  General:  Well nourished    Airway/Jaw/Neck:  Airway Findings: Mouth Opening: Normal Tongue: Normal  General Airway Assessment: Adult  Mallampati: II  TM Distance: Normal, at least 6 cm  Jaw/Neck Findings:  Micrognathia  Neck ROM: Normal ROM  Neck Findings: Normal    Eyes/Ears/Nose:  EYES/EARS/NOSE FINDINGS: Normal   Dental:  DENTAL FINDINGS: Normal   Chest/Lungs:  Chest/Lungs Findings: Clear to auscultation, Normal Respiratory Rate     Heart/Vascular:  Heart Findings: Rate: Normal  Rhythm: Regular Rhythm        Mental Status:  Mental Status Findings:  Cooperative, Alert and Oriented         Anesthesia Plan  Type of Anesthesia, risks & benefits discussed:  Anesthesia Type:  general  Patient's Preference:   Intra-op Monitoring Plan: standard ASA monitors  Intra-op Monitoring Plan Comments:   Post Op Pain Control Plan: IV/PO Opioids PRN  Post Op Pain Control Plan Comments:   Induction:   IV  Beta Blocker:  Patient is not currently on a Beta-Blocker (No further documentation required).       Informed Consent: Patient understands risks and agrees with Anesthesia plan.  Questions answered. Anesthesia consent signed with patient.  ASA Score: 3      Day of Surgery Review of History & Physical: I have interviewed and examined the patient. I have reviewed the patient's H&P dated:  There are no significant changes.  H&P update referred to the surgeon.         Ready For Surgery From Anesthesia Perspective.

## 2019-05-29 NOTE — TRANSFER OF CARE
"Anesthesia Transfer of Care Note    Patient: Jones Germain    Procedure(s) Performed: Procedure(s) (LRB):  ROBOTIC PROSTATECTOMY (N/A)  LYMPHADENECTOMY, pelvic (Bilateral)    Patient location: PACU    Anesthesia Type: general    Transport from OR: Transported from OR on 2-3 L/min O2 by NC with adequate spontaneous ventilation    Post pain: adequate analgesia    Post assessment: no apparent anesthetic complications    Post vital signs: stable    Level of consciousness: sedated and responds to stimulation    Nausea/Vomiting: no nausea/vomiting    Complications: none    Transfer of care protocol was followed      Last vitals:   Visit Vitals  /84 (BP Location: Left arm, Patient Position: Sitting)   Pulse 88   Temp 36.8 °C (98.2 °F) (Skin)   Resp 18   Ht 5' 6" (1.676 m)   Wt 72.1 kg (159 lb)   SpO2 97%   BMI 25.66 kg/m²     "

## 2019-05-29 NOTE — BRIEF OP NOTE
[facility] Urology Brief Operative Note     Date: 05/29/2019     Staff Surgeon: Baljinder Johnson MD    Bedside assistant: CHIP Newton     Pre-Op Diagnosis: prostate cancer     Post-Op Diagnosis: prostate cancer     Procedure(s) Performed:   Robot-assisted laparoscopic radical prostatectomy (mod 22)  Bilateral pelvic lymphadenectomy     Specimen(s):   1. Prostate and seminal vesicles  2. Periprostatic fat and lymph nodes  3. Right pelvic lymph nodes  4. Left pelvic lymph nodes  5. Posterior bladder neck     Anesthesia: General endotracheal anesthesia     Findings: moderate sigmoid adhesions, significant median lobe requiring extended bladder neck dissection and necessitating bladder neck reconstruction, no visualized leak after on testing anastamosis      Estimated Blood Loss: 75cc     Drains: 7mm eric, 20fr jones     Complications: none     Disposition: pacu

## 2019-05-29 NOTE — H&P
[facility] Urology Progress Note     Jones Germain is a 58 y.o. male who presents for prostate cancer follow up, on active surveillance     He has a history of HIV and has been on atripla for 25 years with undetectable viral, who was referred by Dr. Wilder after discovering prostate nodule on exam in July 2018  PSA 2.0. No famHx CaP  Has history of kidney stones - in 2006. Went to [facility] for lithotripsy at that time. Only the one episode  NTF 1-2. No urinary hesitancy. Rare intermittency. Feels like he empties his bladder.  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 Does drink afternoon/evening coffee  + smoker - 40 years at about 1 pack.  ERIBERTO: 20-25g, nontender, smooth symmetrical except for small <0.5cm firn nodule just to left of central sulcus     Prostate biopsy 9/25/18:  TRUS VOLUME:57.3cm3 (W 50.3mm, H 35.7mm, L 61mm)  SPECIMEN:    ULTRASOUND FINDINGS: + median lobe, hypoechoities in L SV, mildly heterogenous apices, extension at LA consistent with palpable nodule  PATHOLOGY: 5/15 cores aiden 3+3=6 CaP, largely left sided, and one core ASAP as below  RA lateral < 5%; RA medial 10% +PNI; RTZ asap; LM med <5%, LA lat 20%, LTZ 30%     I last saw him in October 2018 to discuss his pathology.  We did review all treatment options for prostate cancer, and he elected active surveillance at that time and was considering treatment in the future.  - specific to this patient we did discuss that although he has low risk Gleas  on 6 pathology, he does have palpable nodule, is young for prostate cancer at age 58 which could portend increased anxiety/expense/risks of further investigations on surveillance protocol, and may have more benefit from up-front treatment of prostate cancer albeit with noted side effects as discussed of treatment options     He returns today with psa of 2.7 on 4/25/19, up from 2.0 at time of diagnosis 6 months ago based on palpable nodule.  He also notes progression of his bothersome  "LUTS  AUA symptom score:  17/5, unhappy (4:  Weak stream, sleeping; 3:  Emptying, intermittency; 2:  Frequency; 1:  Straining)  Urge to urinate is worse, and now 4x at night. Doesn't always stop fluids 2h before bed.  Urinalysis dipstick with trace blood and trace leukocytes.  He would like to proceed with prostatectomy.  Good baseline erections.     ROS: A comprehensive 10 system review was performed and is negative except as noted above in HPI     PHYSICAL EXAM:         Vitals:     04/26/19 0929   BP: (!) 155/83   Pulse: 85   Resp: 18      Body mass index is 24.91 kg/m². Weight: 70 kg (154 lb 5.2 oz) Height: 5' 6" (167.6 cm)         General: Alert, cooperative, no distress, appears stated age   Head: Normocephalic, without obvious abnormality, atraumatic   Eyes: PERRL, conjunctiva/corneas clear   Lungs: Respirations unlabored   Heart: Warm and well perfused   Abdomen: soft NT ND  Extremities: Extremities normal, atraumatic, no cyanosis or edema   Skin: Skin color, texture, turgor normal, no rashes or lesions   Psych: Appropriate   Neurologic: Non-focal         Recent Results         Recent Results (from the past 336 hour(s))   PROSTATE SPECIFIC ANTIGEN, DIAGNOSTIC     Collection Time: 04/25/19  8:41 AM   Result Value Ref Range     PSA DIAGNOSTIC 2.7 0.00 - 4.00 ng/mL   POCT URINE DIPSTICK WITHOUT MICROSCOPE     Collection Time: 04/26/19  9:34 AM   Result Value Ref Range     Color, UA yellow       Spec Grav UA 1.015       pH, UA 6.5       WBC, UA trace       Nitrite, UA neg       Protein neg       Glucose, UA neg       Ketones, UA neg       Urobilinogen, UA 0.2       Bilirubin neg       Blood, UA trace              ASSESSMENT   1. Prostate cancer  POCT URINE DIPSTICK WITHOUT MICROSCOPE     Diet NPO     Case Request Operating Room: ROBOTIC PROSTATECTOMY, LYMPHADENECTOMY, pelvic     Admit to Inpatient     Insert peripheral IV     Reason for No Pharmacological VTE Prophylaxis     Place sequential compression device "     Place RAF hose     Basic metabolic panel     CBC auto differential     Protime-INR     Urinalysis     Urine culture     Type And Screen Preop     EKG 12-lead     X-Ray Chest PA And Lateral     Urine culture         Plan    At this time he is interested in surgical extirpative therapy after careful consideration of all treatment options, and has elected to proceed with robot-assisted laparoscopic radical prostatectomy.  He has a clinically palpable nodule with cT2 a disease which prompted diagnosis at a PSA of 2, which has increased to 2.7 in the last 6 months.  He has also had progression of his lower urinary tract symptoms.     The procedure in general including hospital stay and postoperative recovery process were discussed in detail. Reviewed hospital stay, CARLOTTA drain and jones management, reminding him the jones will remain indwelling at minimum 7-10 days during which time he should refrain from driving. Risks of surgery were discussed including but not limited to up-front urinary incontinence and erectile dysfunction which we will work to overcome with kegel excercises and any number of ED therapies. We discussed concurrent possible bilateral pelvic lymphadenectomy with surgery, and risks associated, as is not mandated in [place] 6 disease with low nomogram risk of lymph node involvement. We did also discuss nerve sparing status based on pathology:  Given [place] 6 disease, nerve-sparing is appropriate, however given clinically palpable disease on right with noted perineural invasion on biopsy would consider wider it excision here versus modified nerve spare, with left nerve-sparing vs modified nerve-sparing as well.  Detailed discussion about timeline of recovery of erectile function, and potential to not recover spontaneous erectile function.  We also discussed implications of known median lobe on bladder neck and anastomosis and potential need for bladder neck reconstruction All questions answered.  Risks  of surgery were discussed including but not limited to urinary incontinence, erectile dysfunction, injury to intraabdominal structures, urine leak, anastamotic leak, rectal injury, damage to ureters, hernia, postoperative ileus.  The need for monitoring his PSA postoperatively was also discussed with the patient. Any adjuvant treatment, including hormonal and/or radiation treatment may be dependent on his final pathology report, including final aiden score, margin status, extracapsular invasion, or seminal vesicle invasion. Such adjuvant therapies may also be necessary in the case of postoperative psa rise.      Robotic radical prostatectomy scheduled on 5/29/19  All questions were answered and appropriate informed consent was obtained.      I did instruct the patient on kegel exercises today and advised to practice them preoperatively to strengthen his pelvic floor muscles prior to surgery to facilitate postoperative return of continence.  He reports that he has only seen PCP Dr. Ulloa 1 time at the referral of Dr. Wilder, who has been following him for years, so will CC Dr Wilder regarding any preop optimization recommendations, especially as it relates to his antiretrovirals and any associated perior concerns or adjustments.     As well, in interim, offered to begin medical therapy for his bothersome lower urinary tract symptoms but he denied at this time and will manage conservatively until surgery.  Advised to discontinue fluid intake 2 hr before bed  Discussed conservative measures to control urgency and frequency including avoiding bladder irritants, timed voiding, not postponing voiding, and bowel regimen (as distended bowel has extrinsic compressive effect on bladder. Discussed bladder irritants include coffe (even decaf), tea, alcohol, soda, spicy foods, acidic juices (orange, tomato), vinegar, and artificial sweeteners.  Also discuss the implications of urgency, and worsening urgency/frequency after  relief of prostatic obstruction, and the most severe case manifesting as urge incontinence which may con found his recovery of stress urinary incontinence and will be assessed closely in the postoperative period.         All questions answered in preop holding

## 2019-05-30 VITALS
DIASTOLIC BLOOD PRESSURE: 67 MMHG | HEART RATE: 79 BPM | HEIGHT: 66 IN | SYSTOLIC BLOOD PRESSURE: 129 MMHG | OXYGEN SATURATION: 97 % | WEIGHT: 159 LBS | RESPIRATION RATE: 18 BRPM | BODY MASS INDEX: 25.55 KG/M2 | TEMPERATURE: 98 F

## 2019-05-30 LAB
ANION GAP SERPL CALC-SCNC: 7 MMOL/L (ref 8–16)
BASOPHILS # BLD AUTO: 0 K/UL (ref 0–0.2)
BASOPHILS NFR BLD: 0.2 % (ref 0–1.9)
BODY FLUID SOURCE, CREATININE: NORMAL
BUN SERPL-MCNC: 8 MG/DL (ref 6–20)
CALCIUM SERPL-MCNC: 8.1 MG/DL (ref 8.7–10.5)
CHLORIDE SERPL-SCNC: 104 MMOL/L (ref 95–110)
CO2 SERPL-SCNC: 25 MMOL/L (ref 23–29)
CREAT FLD-MCNC: 1 MG/DL
CREAT SERPL-MCNC: 0.9 MG/DL (ref 0.5–1.4)
DIFFERENTIAL METHOD: ABNORMAL
EOSINOPHIL # BLD AUTO: 0 K/UL (ref 0–0.5)
EOSINOPHIL NFR BLD: 0.2 % (ref 0–8)
ERYTHROCYTE [DISTWIDTH] IN BLOOD BY AUTOMATED COUNT: 14.5 % (ref 11.5–14.5)
EST. GFR  (AFRICAN AMERICAN): >60 ML/MIN/1.73 M^2
EST. GFR  (NON AFRICAN AMERICAN): >60 ML/MIN/1.73 M^2
GLUCOSE SERPL-MCNC: 130 MG/DL (ref 70–110)
HCT VFR BLD AUTO: 38.1 % (ref 40–54)
HGB BLD-MCNC: 13.1 G/DL (ref 14–18)
LYMPHOCYTES # BLD AUTO: 1.3 K/UL (ref 1–4.8)
LYMPHOCYTES NFR BLD: 15.8 % (ref 18–48)
MAGNESIUM SERPL-MCNC: 1.8 MG/DL (ref 1.6–2.6)
MCH RBC QN AUTO: 32.2 PG (ref 27–31)
MCHC RBC AUTO-ENTMCNC: 34.4 G/DL (ref 32–36)
MCV RBC AUTO: 94 FL (ref 82–98)
MONOCYTES # BLD AUTO: 0.6 K/UL (ref 0.3–1)
MONOCYTES NFR BLD: 6.8 % (ref 4–15)
NEUTROPHILS # BLD AUTO: 6.2 K/UL (ref 1.8–7.7)
NEUTROPHILS NFR BLD: 77 % (ref 38–73)
PHOSPHATE SERPL-MCNC: 3.7 MG/DL (ref 2.7–4.5)
PLATELET # BLD AUTO: 212 K/UL (ref 150–350)
PMV BLD AUTO: 7.5 FL (ref 9.2–12.9)
POTASSIUM SERPL-SCNC: 4 MMOL/L (ref 3.5–5.1)
RBC # BLD AUTO: 4.07 M/UL (ref 4.6–6.2)
SODIUM SERPL-SCNC: 136 MMOL/L (ref 136–145)
WBC # BLD AUTO: 8.1 K/UL (ref 3.9–12.7)

## 2019-05-30 PROCEDURE — 25000003 PHARM REV CODE 250: Performed by: UROLOGY

## 2019-05-30 PROCEDURE — 36415 COLL VENOUS BLD VENIPUNCTURE: CPT

## 2019-05-30 PROCEDURE — 80048 BASIC METABOLIC PNL TOTAL CA: CPT

## 2019-05-30 PROCEDURE — 63600175 PHARM REV CODE 636 W HCPCS: Performed by: UROLOGY

## 2019-05-30 PROCEDURE — 94799 UNLISTED PULMONARY SVC/PX: CPT

## 2019-05-30 PROCEDURE — 83735 ASSAY OF MAGNESIUM: CPT

## 2019-05-30 PROCEDURE — 82570 ASSAY OF URINE CREATININE: CPT

## 2019-05-30 PROCEDURE — 94761 N-INVAS EAR/PLS OXIMETRY MLT: CPT

## 2019-05-30 PROCEDURE — 84100 ASSAY OF PHOSPHORUS: CPT

## 2019-05-30 PROCEDURE — S5010 5% DEXTROSE AND 0.45% SALINE: HCPCS | Performed by: UROLOGY

## 2019-05-30 PROCEDURE — 85025 COMPLETE CBC W/AUTO DIFF WBC: CPT

## 2019-05-30 RX ORDER — IBUPROFEN 400 MG/1
400 TABLET ORAL EVERY 6 HOURS PRN
COMMUNITY
Start: 2019-05-30 | End: 2019-06-27

## 2019-05-30 RX ORDER — DOCUSATE SODIUM 100 MG/1
100 CAPSULE, LIQUID FILLED ORAL 2 TIMES DAILY
Refills: 0 | COMMUNITY
Start: 2019-05-30 | End: 2019-06-27

## 2019-05-30 RX ORDER — POLYETHYLENE GLYCOL 3350 17 G/17G
17 POWDER, FOR SOLUTION ORAL DAILY
Qty: 30 EACH | Refills: 0 | Status: SHIPPED | OUTPATIENT
Start: 2019-05-31 | End: 2019-06-27

## 2019-05-30 RX ORDER — TRAMADOL HYDROCHLORIDE 50 MG/1
50 TABLET ORAL EVERY 6 HOURS PRN
Qty: 15 TABLET | Refills: 0 | Status: SHIPPED | OUTPATIENT
Start: 2019-05-30 | End: 2019-06-27

## 2019-05-30 RX ORDER — SIMETHICONE 80 MG
80 TABLET,CHEWABLE ORAL 3 TIMES DAILY PRN
Refills: 0 | COMMUNITY
Start: 2019-05-30 | End: 2019-06-27

## 2019-05-30 RX ORDER — SULFAMETHOXAZOLE AND TRIMETHOPRIM 800; 160 MG/1; MG/1
1 TABLET ORAL 2 TIMES DAILY
Qty: 14 TABLET | Refills: 0 | Status: SHIPPED | OUTPATIENT
Start: 2019-05-30 | End: 2019-06-06 | Stop reason: SDUPTHER

## 2019-05-30 RX ORDER — ACETAMINOPHEN 325 MG/1
650 TABLET ORAL EVERY 4 HOURS PRN
Refills: 0 | COMMUNITY
Start: 2019-05-30 | End: 2019-06-27

## 2019-05-30 RX ADMIN — POLYETHYLENE GLYCOL 3350 17 G: 17 POWDER, FOR SOLUTION ORAL at 09:05

## 2019-05-30 RX ADMIN — BUPROPION HYDROCHLORIDE 150 MG: 150 TABLET, EXTENDED RELEASE ORAL at 07:05

## 2019-05-30 RX ADMIN — DEXTROSE AND SODIUM CHLORIDE: 5; .45 INJECTION, SOLUTION INTRAVENOUS at 10:05

## 2019-05-30 RX ADMIN — KETOROLAC TROMETHAMINE 15 MG: 30 INJECTION, SOLUTION INTRAMUSCULAR at 11:05

## 2019-05-30 RX ADMIN — CEFAZOLIN SODIUM 2 G: 2 SOLUTION INTRAVENOUS at 04:05

## 2019-05-30 RX ADMIN — ACETAMINOPHEN 1000 MG: 10 INJECTION, SOLUTION INTRAVENOUS at 05:05

## 2019-05-30 RX ADMIN — TRAMADOL HYDROCHLORIDE 50 MG: 50 TABLET ORAL at 11:05

## 2019-05-30 RX ADMIN — SIMETHICONE CHEW TAB 80 MG 80 MG: 80 TABLET ORAL at 09:05

## 2019-05-30 RX ADMIN — SIMETHICONE CHEW TAB 80 MG 80 MG: 80 TABLET ORAL at 03:05

## 2019-05-30 RX ADMIN — KETOROLAC TROMETHAMINE 15 MG: 30 INJECTION, SOLUTION INTRAMUSCULAR at 06:05

## 2019-05-30 RX ADMIN — DEXTROSE AND SODIUM CHLORIDE: 5; .45 INJECTION, SOLUTION INTRAVENOUS at 01:05

## 2019-05-30 RX ADMIN — FAMOTIDINE 20 MG: 20 TABLET, FILM COATED ORAL at 09:05

## 2019-05-30 RX ADMIN — KETOROLAC TROMETHAMINE 15 MG: 30 INJECTION, SOLUTION INTRAMUSCULAR at 12:05

## 2019-05-30 RX ADMIN — DOCUSATE SODIUM 100 MG: 100 CAPSULE, LIQUID FILLED ORAL at 09:05

## 2019-05-30 RX ADMIN — CEFAZOLIN SODIUM 2 G: 2 SOLUTION INTRAVENOUS at 11:05

## 2019-05-30 NOTE — PROGRESS NOTES
05/30/19 0725   Patient Assessment/Suction   Level of Consciousness (AVPU) alert   PRE-TX-O2   O2 Device (Oxygen Therapy) room air   SpO2 97 %   Pulse Oximetry Type Intermittent   $ Pulse Oximetry - Multiple Charge Pulse Oximetry - Multiple   Pulse 61   Resp 20   Incentive Spirometer   $ Incentive Spirometer Charges done with encouragement   Incentive Spirometer Predicted Level (mL) 3000   Administration (IS) instruction provided, follow-up;proper technique demonstrated   Number of Repetitions (IS) 15   Level Incentive Spirometer (mL) 2000   Patient Tolerance (IS) good

## 2019-05-30 NOTE — PLAN OF CARE
Report to dimitri. Patient in no distress, fever of 101.8, complaint of pain but doses back to sleep. No nausea noted, vs stable, patient on tylenol for fever and pain. Dressing to abdomen dry, intact, no drainage except eric drain to left abdomen, emptied 50 cc of blood prior to admission to floor. No one present at hospital. Lynne draining red to pink colored urine to gravity.;

## 2019-05-30 NOTE — PLAN OF CARE
05/30/19 1619   Final Note   Assessment Type Final Discharge Note   Anticipated Discharge Disposition Home   Hospital Follow Up  Appt(s) scheduled? Yes

## 2019-05-30 NOTE — ANESTHESIA POSTPROCEDURE EVALUATION
Anesthesia Post Evaluation    Patient: Jones Germain    Procedure(s) Performed: Procedure(s) (LRB):  ROBOTIC PROSTATECTOMY (N/A)  LYMPHADENECTOMY, pelvic (Bilateral)    Final Anesthesia Type: general  Patient location during evaluation: PACU  Patient participation: Yes- Able to Participate  Level of consciousness: awake and alert  Post-procedure vital signs: reviewed and stable  Pain management: adequate  Airway patency: patent  PONV status at discharge: No PONV  Anesthetic complications: no      Cardiovascular status: hemodynamically stable  Respiratory status: unassisted and nasal cannula  Hydration status: euvolemic  Follow-up not needed.          Vitals Value Taken Time   /67 5/29/2019  7:33 PM   Temp 36.8 °C (98.2 °F) 5/29/2019 10:52 AM   Pulse 88 5/29/2019  7:34 PM   Resp 14 5/29/2019  7:34 PM   SpO2 96 % 5/29/2019  7:34 PM   Vitals shown include unvalidated device data.      No case tracking events are documented in the log.      Pain/Esther Score: Pain Rating Prior to Med Admin: 0 (5/29/2019  7:35 PM)  Esther Score: 8 (5/29/2019  7:35 PM)

## 2019-05-30 NOTE — PLAN OF CARE
PCP is Dr Ulloa.  Verified insurance as "[employer]  Pharmacy is [facility], and [email or web address] on [address].  Lives alone; independent with ADL's.  Denies HH/DME.  Discharge plan is home; no needs.       05/30/19 1139   Discharge Assessment   Assessment Type Discharge Planning Assessment   Confirmed/corrected address and phone number on facesheet? Yes   Assessment information obtained from? Patient   Prior to hospitilization cognitive status: Alert/Oriented   Prior to hospitalization functional status: Independent   Current cognitive status: Alert/Oriented   Current Functional Status: Independent   Lives With alone   Able to Return to Prior Arrangements yes   Is patient able to care for self after discharge? Yes   Patient's perception of discharge disposition home or selfcare   Readmission Within the Last 30 Days no previous admission in last 30 days   Patient currently being followed by outpatient case management? No   Patient currently receives any other outside agency services? No   Equipment Currently Used at Home none   Do you have any problems affording any of your prescribed medications? No   Is the patient taking medications as prescribed? yes   Does the patient have transportation home? Yes   Transportation Anticipated family or friend will provide   Dialysis Name and Scheduled days none   Does the patient receive services at the [facility]? No   Discharge Plan A Home   DME Needed Upon Discharge  none   Patient/Family in Agreement with Plan yes

## 2019-05-30 NOTE — NURSING
Removed CARLOTTA drain as ordered by Dr. Johnson. Leg bag and overnight bag teaching done with pt with return demonstration regarding the same. Pt ambulated in hallway and in room with standby assist. Continue to monitor pt. Call light in easy reach.

## 2019-05-30 NOTE — PLAN OF CARE
Patient ambulated 100 in hallway. UO and closed suction device output drained and documented. Pain medicine administered as needed.

## 2019-05-30 NOTE — PLAN OF CARE
Problem: Adult Inpatient Plan of Care  Goal: Plan of Care Review  Outcome: Ongoing (interventions implemented as appropriate)  Patient AAO, VSS. IVF and abx infusing as ordered. Pt c/o pain with movement.  Up to chair at end of shift.  Lap sites x5 with dermabond.  CARLOTTA drain to bulb suction with bloody drainage.  Lynne intact with clear yellow urine.  IS at bedside.  Teaching provided. Pt verbalized understanding of POC. Purposeful hourly/q2hr rounding done during shift to promote patient safety. Patient free from falls and injury during shift.  Bed in lowest position, brakes locked, and call light within reach.  Will continue to monitor.

## 2019-05-30 NOTE — DISCHARGE SUMMARY
[facility]  Urology  Discharge Summary      Patient Name: Jones Germain  MRN: [id number]  Admission Date: 5/29/2019  Hospital Length of Stay: 1 days  Discharge Date and Time:  05/30/2019 3:48 PM  Attending Physician: Baljinder Johnson MD   Discharging Provider: Baljinder Johnson MD  Primary Care Physician: Ninfa Ulloa MD    HPI:   Mr Germain is a 58 yo M referred by Dr. Wilder after finding of prostate nodule on ERIBERTO, which I confirmed on exam to left of central sulcus, and he had a PSA of 2.0 He underwent TRUS/biopsy on 9/25/18 revealing a 57.3g gland with 5/15 cores aiden 6 prostate cancer, largely left sided, with small volume on right. He initially elected active surveillance and returned 6 months later with interval psa rise to 2.7 and progression of his bothersome LUTS with AUA SS 17/5. After careful consideration and extensive discussion of management options a, he elected to proceed with robotic assisted radical prostatectomy.     Procedure(s) (LRB):  ROBOTIC PROSTATECTOMY (N/A)  LYMPHADENECTOMY, pelvic (Bilateral)     Indwelling Lines/Drains at time of discharge:   Lines/Drains/Airways     Drain                           Urethral Catheter 05/29/19 1442 Latex;Straight-tip 20 Fr. 1 day                Hospital Course   Patient convalesced well on the medical surgical floor overnight.  Overnight on postop day 0 he did have a temperature of a 100.8°, which resolved after aggressive pulmonary toilet and using incentive spirometer.  He completed his postoperative course of 24 hr of IV antibiotics, was ambulatory postop day 1, tolerating regular diet, and pain well controlled.  No further fever and felt well.  Good urine output.  CARLOTTA drain fluid creatinine equivalent with serum in CARLOTTA output was minimal and not increase upon ambulation, and despite bladder neck reconstruction, anastomosis was good with no intraoperative evidence of urine leak, and now no postoperative evidence of urine leak  with CARLOTTA fluid equivalent to serum, and so CARLOTTA drain discontinued prior to discharge home.  Received Jones bag teaching.  Encouraged continued IS use and advised to take home with him.  Rather than once daily Bactrim DS prophylaxis, was given b.i.d. Course.      Pending Diagnostic Studies:     None          Final Active Diagnoses:    Diagnosis Date Noted POA    PRINCIPAL PROBLEM:  Prostate cancer [C61] 12/27/2018 Yes      Problems Resolved During this Admission:         Discharged Condition: good    Disposition: Home or Self Care    Follow Up:  Follow-up Information     Baljinder Sosa MD On 6/6/2019.    Specialty:  Urology  Why:  check in by 0830 at outpatient registration for cystogram/planned jones removal in radiology. May be a slight wait. Bring diaper/depend with you  Contact information:  [address] [place] [address] [address]  [facility] [address]  [phone]                 Patient Instructions:      FL Cystogram Minimum 3 Views (xpd) - Non Rad Performed   Standing Status: Future Standing Exp. Date: 05/30/20     Order Specific Question Answer Comments   Reason for Exam: post prostatectomy by dr sosa    May the Radiologist modify the order per protocol to meet the clinical needs of the patient? Yes      Call MD for:   Order Comments: Catheter related issues/poor jones drainage     No dressing needed   Order Comments: Will see surgical glue over incisions eventually start to peel, then ok at that time to assist in removing in shower     Change dressing (specify)   Order Comments: To drain site only as needed for drainage for first few days, bandaid, then when drainage stops leave open to air     No driving until:   Order Comments: Jones removed and not taking Rx pain meds     Activity as tolerated   Order Comments: If no BM in 3 days, take milk of magnesia as directed. Do not push or strain.   Continue stool softeners and miralax until regular  Alternate tylenol and advil for pain and discomfort, using Rx meds  "sparingly for refractory pain  No driving while jones in place.  No lifting/pushing/pulling >10 lbs x 4 weeks  No aspirin, fish oil, Vit e x 3 days  Ok to shower, pat incisions dry. No baths/soaks  Can help "surgical glue" peel off in shower starting in 1 week once starts to flake up. Stitches will dissolve on their own.  Alternate legs for catheter each day. Use large bag at night  Drink a LOT of water to keep urine clear     Medications:  Reconciled Home Medications:      Medication List      START taking these medications    acetaminophen 325 MG tablet  Commonly known as:  TYLENOL  Take 2 tablets (650 mg total) by mouth every 4 (four) hours as needed.     docusate sodium 100 MG capsule  Commonly known as:  COLACE  Take 1 capsule (100 mg total) by mouth 2 (two) times daily.     ibuprofen 400 MG tablet  Commonly known as:  ADVIL,MOTRIN  Take 1 tablet (400 mg total) by mouth every 6 (six) hours as needed (pain).     polyethylene glycol 17 gram Pwpk  Commonly known as:  GLYCOLAX  Take 17 g by mouth once daily.  Start taking on:  5/31/2019     simethicone 80 MG chewable tablet  Commonly known as:  MYLICON  Take 1 tablet (80 mg total) by mouth 3 (three) times daily as needed (gas pain / bloating).     sulfamethoxazole-trimethoprim 800-160mg 800-160 mg Tab  Commonly known as:  BACTRIM DS  Take 1 tablet by mouth 2 (two) times daily. for 7 days     traMADol 50 mg tablet  Commonly known as:  ULTRAM  Take 1 tablet (50 mg total) by mouth every 6 (six) hours as needed (pain not relieved by otc agents).        CONTINUE taking these medications    aspirin 81 MG Chew  Take 81 mg by mouth once daily.     buPROPion 150 MG TB24 tablet  Commonly known as:  WELLBUTRIN XL  Take 1 tablet (150 mg total) by mouth every morning.     efavirenz 600 mg Tab  Commonly known as:  SUSTIVA  Take 1 tablet (600 mg total) by mouth every evening.     emtricitabine-tenofovir alafen 200-25 mg Tab  Commonly known as:  DESCOVY  Take 1 tablet by mouth " once daily.     pravastatin 40 MG tablet  Commonly known as:  PRAVACHOL  Take 1 tablet (40 mg total) by mouth once daily.            Time spent on the discharge of patient: 30 minutes    Baljinder Johnson MD  Urology  [facility]

## 2019-05-30 NOTE — PLAN OF CARE
Patient ambulated 100 feet in hallway. VSS, UO clear pink, complains of mild gas pain of 5/10. Pain meds offered, pt refused. Will ambulate again in an hour. Patient currently up in chair.

## 2019-06-02 NOTE — OP NOTE
[facility] Urology Operative Report     Date: 5/29/2019     Staff Surgeon: Baljinder Johnson MD     Bedside Assistant: CHIP Newton     Pre-Op Diagnosis: Prostate cancer     Post-Op Diagnosis: same     Procedure(s) Performed:   1. Robot-assisted laparoscopic radical prostatectomy, right nerve sparing (mod. 22)  2. Bilateral pelvic lymphadenectomy     Specimen(s):   1. Prostate and seminal vesicles  2. Periprostatic fat and lymph nodes  3. Right pelvic lymph nodes  4. Left pelvic lymph nodes  5. Posterior bladder neck      Anesthesia: General endotracheal anesthesia     Findings: moderate sigmoid adhesions, significant median lobe requiring extended bladder neck dissection and necessitating bladder neck reconstruction, no visualized leak after on testing anastamosis   - given extended time and complexity of posterior bladder neck dissection secondary to large median lobe, and of anastamosis secondary to bladder neck reconstruction, modifier 22 should be applied     Estimated Blood Loss: 75cc     Drains: 7mm flat CARLOTTA, 20 fr jones     Complications: none     Indications for procedure:  Mr Germain is a 58 yo M referred by Dr. Wilder after finding of prostate nodule on ERIBERTO, which I confirmed on exam to left of central sulcus, and he had a PSA of 2.0 He underwent TRUS/biopsy on 9/25/18 revealing a 57.3g gland with 5/15 cores aiden 6 prostate cancer, largely left sided, with small volume on right. He initially elected active surveillance and returned 6 months later with interval psa rise to 2.7 and progression of his bothersome LUTS with AUA SS 17/5. After careful consideration and extensive discussion of management options a, he elected to proceed with robotic assisted radical prostatectomy.  All risks and benefits of the procedure were discussed in detail, including the potential for bladder neck complications and bladder neck reconstruction secondary to his obstructive median lobe. The patient was made aware of  the risks and benefits of the procedure including erectile dysfunction, incontinence, pain, infection, bleeding, injury to intraabdominal structures, including the rectum, urine leak, anastamotic leak, postoperative ileus, and the risks of general anesthesia including heart attack, stroke, blood clot, and death.  He accepted the risks and he elects to proceed with the aforementioned procedure.      Procedure in detail:  After appropriate informed consent was obtained, the patient was taken to the operating room and placed in the lithotomy position. He was prepped and draped in sterile fashion. Preoperative antibiotics, 2 gm ancef, were given and WHO-approved time-out was performed. An 18 Bangladeshi jones catheter was placed steriley on the field.      A 12mm vertical supraumbilical incision was made, and Veress needle passed through it to insufflate the abdomen up to 15 mmHg. Once the abdomen was insufflated, 12-mm camera trocar was placed through this midline incision and robotic camera was passed in to inspect the abdomen and pelvis. No trauma from the Veress needle was noted. Moderate colon adhesions to lateral wall bilaterally.  Two left sided 8-mm robotic trocars, a right 8-mm robotic trocar, right lateral 12 mm assistant port and right medial 5-mm assistant port were all placed under direct vision. Once the ports were place, he was placed in steep trendelenburg position and the robot was docked in standard fashion between the patient's legs.      He did significant sigmoid adhesions to lateral wall which were taking down without the use of cautery in an extensive adhesiolysis, to free the pelvis of obstruction. No distinct plane between colon and sidewall required extended meticulous dissection.   We then began in the anterior approach by dropping the patients bladder in the standard fashion by dividing the bilateral medial umbilical ligaments. Once the space of Retzius was developed and the bladder was dropped,  the prostate was visible and was defatted in standard fashion. Periprostatic fat and lymph nodes sent for pathologic analysis.     The bilateral endopelvic fascia were incised from base to apex of the prostate taking caution to sweep the levator muscles laterally. This did allow us to place a dorsal venous complex stitch. A 2-0 Vicryl on a CT-1 needle was passed into the pelvis, it was anchored to the periosteum of the pubic symphysis on the right with a Lapra-Ty and wrapped around the dorsal venous complex twice, with suture anchoring to the periosteum of the pubic symphysis on the left as well.       Once the dorsal venous complex stitch was placed, we did check the Lynne catheter, which did move freely back and forth at this time. Then proceeded with anterior bladder neck dissection.  This was identified by placing traction on the Lynne catheter and following the curve of the prostate around laterally. This was done in a bladder neck sparing technique and once the anterior bladder neck was incised to make a cystotomy, the Lynne catheter balloon was deflated and passed through the anterior cystotomy and placed on traction using the fourth robotic arm. This was done in a bladder neck sparing approach until the posterior bladder neck could be incised to allow the posterior dissection to continue to develop the space between Denonvilliers' fascia between the bladder at the base of the prostate.      He was noted to have a significant median lobe projection identified at this time and careful dissection of the posterior bladder neck was performed to separate this from the median lobe of the prostate.  In order to do this safely without back walling the bladder, and given the size and ball valving nature of the median lobe projection, it was necessary to get a robotic tenaculum to grasp the lateral borders of the median lobe and elevated toward the abdominal wall.  It was necessary to create a wide bladder neck to  facilitate meticulous dissection of the bladder neck away from this prostatic projection and the posterior plane was followed around the lateral borders of the bladder neck to help elevate the mucosa away from prostatic tissue.  Extended time in dissection of the posterior bladder neck a was necessary to meticulously a separate posterior bladder neck from median lobe, and after successful separation could then develop the posterior space in Denonviller's fascia. Given the efforts to dissect free from the median lobe his bladder neck was a bit wide and slightly thin posteriorly.  As well, at the lateral edges very thin with only the layer of mucosa seen, intact with the remainder circumferentially, without any significant tissue border around it.  This did necessitate later bladder neck reconstruction described below at time of anastomosis.  Hem-o-elin clips were used on the prostatic pedicles to assist in opening up the posterior space, after which time the ampulla of the vas deferens were identified bilaterally.     For the posterior dissection, the vas deferens were freed up and transected low and placed to traction with the fourth arm to assist in dissecting free the bilateral seminal vesicles, which once dissected free were all placed to traction on the fourth arm of the prostate with the bilateral ampulla of the vas deferens. After hemostatically taking lateral pedicles with clips, lateral traction placed on the prostate was used to proceed with developing the bilateral lateral planes which were freed at this time.  This was done bilaterally in a nerve-sparing fashion using a combined anterograde and retrograde approach to free the neurovascular bundle from the lateral border of the prostate, keeping capsule intact. Right neurovascular bundle was released from lateral prostate border in nerve-sparing fashion, however it left-sided nodularity could be seen in given palpable nodule a wider excision on the side was  performed.. Once the prostate was free of its lateral attachments, the posterior dissection was completed by developing the plane between the prostate and rectum, incising prerectal fat and Denonviller's.       After free posteriorly and laterally, dissection then carried forward with anterior dissection through the dorsal venous complex until the catheter could be visualized in the urethra, which was dissected around to gain adequate urethral length and then transected and freed any remaining rectourethralis attachments to the prostate.  Once the prostate was cut free, it was passed into the right lower quadrant for later retrieval.     At this time, we proceeded with pelvic lymphadenectomy, starting first on the right.  The right external iliac vein was identified and the lymph nodes lateral to it were skeletonized off of the vein. Clips were placed high in the incoming lymphatics and the lymph node packet was retracted away from the pelvic sidewall until obturator nerve could be seen and lymph nodes were skeletonized away from the obturator nerve to where the nerve and vasculature met.  This lymph node packet was removed and sent for pathologic analysis. Surgicel placed in obturator fossa. Attention was then turned to the left side to repeat this standard pelvic lymphadenectomy template as above, also which were removed and sent for analysis.  After completing the lymph node dissection,  again checked that the bed of resection was hemostatic, which it was noted to be. The pelvis was then irrigated and all blood clots removed, and noted to be hemostatic after irrigation fluid was removed.      Prior to placing the Toi suture and performing anastamosis, the bladder neck was noted to be disproportionately wide secondary to efforts as noted above to dissected away from the median lobe of the prostate, and was quite wide mouthed and thin at lateral borders, necessitating bladder neck reconstruction to proceed.  3 O  Vicryl RB 1 suture in a figure-of-eight fashion used to carefully reapproximate to the tip of the mucosa at these edge with some surrounding fascial tissue carefully to not only ensure mucosal apposition at the edges of the bladder neck but also strength a layer adjacent to facilitate anastomosis.    At this time a single-armed 3-0 quill suture was passed into the abdomen and used as a running Toi stitch to reapproximate the Denonvilliers fascia at the base of the bladder to the rectourethralis muscle below the urethra for fascial reapproximation. Before completing/tensioning this Toi suture, extraneous tissue from the posterior bladder neck was trimmed and sent to pathology and spot cautery was used to gain bladder neck hemostasis without violating the mucosa.  The Toi suture was also used to reapproximate some of the fascia of the posterior bladder neck and to help provide strength flare during anastomosis secondary to bladder neck reconstruction.     Then a double-armed 3-0 Quill anastomotic suture was passed in.  Initial suture placement was done flank Castro the area of reconstruction to help ensure mucosal apposition of bladder neck to urethra at this site.  The first stitch was placed through the 5 o'clock position running anterior on the bladder neck and inside on the urethra around to the left to reapproximate the posterior plate. Other suture run around to the right and anteriorly until sutures met in the middle and 1 was reversed and they were tied down after exchanging the 18-Georgian Jones catheter for a 20-Georgian final Jones catheter and placing traction on both sutures to confirm good anastamotic apposition. Once tied down, the balloon filled with 12 mL saline. 120cc normal saline were then instilled through the jones into the bladder and a no anastomotic leak was noted.  The bladder was gently irrigated clear and placed to drainage bag.      Under direct vision, CrossBows Kelby-Felicia-type  fascial closing device was used to close the fascia of the lateral assistant port with #1 PDS suture, which was loosely placed to a hemostat so the port could be reintroduced. EndoCatch bag was placed into the abdomen through this lateral assistant port and the prostate and seminal vesicles and lymph nodes were placed  inside of it. A 7-mm flat Mayur-Champagne drain was placed to the left lateral robotic trocar under direct visualization as well. Once the drain was in place and the specimen was secured in the specimen bag, the robot was undocked.     At this time, laparoscopic needle drivers were used to pass the specimen bag to the midline camera port and the specimen was removed through the midline camera port after extending it approximately 1 cm. The fascia of this extraction site was closed with a running #1 PDS suture to achieve excellent fascial reapproximation, making sure to incorporate the mesh in each suture for adequate closure.  A layer of 2 0 Vicryl deep dermal sutures was then placed overlying this tight fascial closure in the midline.  The fascial suture in the right [facility] was then tied down as well. With the fascia closed, a 2-0 nylon drain stitch was placed around the Mayur-Champagne drain and the Mayur-Champagne drain was placed to bulb suction. 4-0 Monocryl skin sutures were then used in a subcuticular fashion to close all the skin incisions, which were overlaid with Dermabond. A drain dressing of a 4 x 4 and Tegaderm were placed around the drain. Lynne catheter was placed to the patient's leg with a StatLock and to gravity drainage.      The patient tolerated the procedure well. There were no complications. All instrument,sponge, and needle counts were correct x2 at the end of the case.     Disposition: Extubated and transferred to PACU without incident. The patient will be kept overnight and discharged once ambulatory and tolerating regular diet, likely  tomorrow. Lynne will remain indwelling 7-10 days postop.

## 2019-06-06 ENCOUNTER — TELEPHONE (OUTPATIENT)
Dept: UROLOGY | Facility: CLINIC | Age: 59
End: 2019-06-06

## 2019-06-06 ENCOUNTER — HOSPITAL ENCOUNTER (OUTPATIENT)
Dept: RADIOLOGY | Facility: HOSPITAL | Age: 59
Discharge: HOME | End: 2019-06-06
Attending: UROLOGY
Payer: COMMERCIAL

## 2019-06-06 DIAGNOSIS — C61 PROSTATE CANCER: Primary | ICD-10-CM

## 2019-06-06 DIAGNOSIS — C61 PROSTATE CANCER: ICD-10-CM

## 2019-06-06 PROCEDURE — 51600 PR INJECTION FOR BLADDER X-RAY: ICD-10-PCS | Mod: ,,, | Performed by: UROLOGY

## 2019-06-06 PROCEDURE — 76000 FLUOROSCOPY <1 HR PHYS/QHP: CPT | Mod: 26,59,, | Performed by: UROLOGY

## 2019-06-06 PROCEDURE — 51600 INJECTION FOR BLADDER X-RAY: CPT | Mod: ,,, | Performed by: UROLOGY

## 2019-06-06 PROCEDURE — 74430 PR  X-RAY CYSTOGRAM, MIN 3 VIEW: ICD-10-PCS | Mod: 26,,, | Performed by: UROLOGY

## 2019-06-06 PROCEDURE — 76000 PR  FLUOROSCOPE EXAMINATION: ICD-10-PCS | Mod: 26,59,, | Performed by: UROLOGY

## 2019-06-06 PROCEDURE — 25500020 PHARM REV CODE 255: Performed by: UROLOGY

## 2019-06-06 PROCEDURE — 74430 CONTRAST X-RAY BLADDER: CPT | Mod: 26,,, | Performed by: UROLOGY

## 2019-06-06 PROCEDURE — 51600 INJECTION FOR BLADDER X-RAY: CPT

## 2019-06-06 PROCEDURE — 74430 CONTRAST X-RAY BLADDER: CPT | Mod: TC

## 2019-06-06 RX ORDER — SULFAMETHOXAZOLE AND TRIMETHOPRIM 800; 160 MG/1; MG/1
1 TABLET ORAL 2 TIMES DAILY
Qty: 10 TABLET | Refills: 0 | Status: SHIPPED | OUTPATIENT
Start: 2019-06-06 | End: 2019-06-11

## 2019-06-06 RX ADMIN — IOTHALAMATE MEGLUMINE 120 ML: 172 INJECTION URETERAL at 08:06

## 2019-06-06 RX ADMIN — IOTHALAMATE MEGLUMINE 120 ML: 172 INJECTION URETERAL at 09:06

## 2019-06-06 NOTE — TELEPHONE ENCOUNTER
----- Message from Lisa Boyer sent at 6/6/2019  3:19 PM CDT -----  Type: Needs Medical Advice    Who Called:  self  Symptoms (please be specific):  Antibiotics should have been sent to [facility]    How long has patient had these symptoms:     Pharmacy name and phone #:        [facility] [address] [place] [address] & [address] [address]  Phone: [phone] Fax: [phone]    Best Call Back Number: [phone] (home)     Additional Information: he has checked with [facility] and they have not received

## 2019-06-06 NOTE — PROGRESS NOTES
Mr Germain is a 59 y.o. male who presents for cystogram s/p robotic radical prostatectomy.  He underwent RARP, BPLND 5/29/19 and is here today for postop cystogram/jones removal.  Had significant median lobe necessitating bladder neck reconstruction, with no intraop evidence of anastamotic leak and negative CARLOTTA Cr on POD 1.      He has done well postoperatively though on day 1 s/p discharge reports vomiting and diarrhea. Since that time has had resumption of normal eating and bowel habits. Abdominal exam benign, healing well.    Cystogram     DATE: 6/6/19     PRE-PROCEDURE DIAGNOSIS: Prostate cancer, status post prostatectomy     POST-PROCEDURE DIAGNOSIS: same     PROCEDURE:  1. Cystogram (with injection of contrast for cystogram)  2. Fluoroscopy <1 hour  3. Interpretation of fluoroscopic images     INDICATIONS: as above     PROCEDURE:   Patient was brought to fluoroscopy suite and placed in supine position on fluoro table.   An AP  film was taken.   Sterile aspirate urine from catheter collected for culture  Cystografin passively instilled into bladder using dual 60cc cath tip syringe system.  Filled to 120cc as felt urge to void.  AP image taken noting good bladder contour, with small area of contrast extravasation from right bladder neck. no distinct extravasation  Oblique images taken bilaterally, first  In lateral decubitus with right side similar small area of extravasation,about 1cm from lateral bladder neck.  Patient's bladder drained and post drainage films taken AP, as well as oblique films, and no residual contrast seen on any post drainage films.  Given concern for small area of anastamotic leak, did fill again to 120cc and this small area of leak again noted.   Jones remained indwelling and placed back to leg bag.  Patient tolerated procedure well          ASSESSMENT   Prostate cancer      Plan    Pathology still pending  Will continue bactrim ppx  Discussed natural course of small anastamotic leak  given wide bladder neck and BN reconstruction.   Clinically irrelevant with jones in place as jones adequately draining urine, and allowing this area to heal.  Will repeat cystogram Tues 6/11/19 with anticipated jones removal at that time.  Patient otherwise doing very well s/p prostatectomy so far  Encouraged smoking cessation to facilitate healing.

## 2019-06-10 ENCOUNTER — TELEPHONE (OUTPATIENT)
Dept: UROLOGY | Facility: CLINIC | Age: 59
End: 2019-06-10

## 2019-06-10 NOTE — TELEPHONE ENCOUNTER
----- Message from Baljinder Johnson MD sent at 6/10/2019  3:58 PM CDT -----  Repeating cystogram tomorrow morning tues 6/11   Please place reminder call and advise pt to check in to outpt registration no later than 0800 (between 750-8)

## 2019-06-11 ENCOUNTER — HOSPITAL ENCOUNTER (OUTPATIENT)
Dept: RADIOLOGY | Facility: HOSPITAL | Age: 59
Discharge: HOME | End: 2019-06-11
Attending: UROLOGY
Payer: COMMERCIAL

## 2019-06-11 DIAGNOSIS — C61 PROSTATE CANCER: ICD-10-CM

## 2019-06-11 PROCEDURE — 51600 PR INJECTION FOR BLADDER X-RAY: ICD-10-PCS | Mod: ,,, | Performed by: UROLOGY

## 2019-06-11 PROCEDURE — 76000 FLUOROSCOPY <1 HR PHYS/QHP: CPT | Mod: 26,59,, | Performed by: UROLOGY

## 2019-06-11 PROCEDURE — 74430 CONTRAST X-RAY BLADDER: CPT | Mod: TC

## 2019-06-11 PROCEDURE — 51600 INJECTION FOR BLADDER X-RAY: CPT | Mod: ,,, | Performed by: UROLOGY

## 2019-06-11 PROCEDURE — 74430 PR  X-RAY CYSTOGRAM, MIN 3 VIEW: ICD-10-PCS | Mod: 26,,, | Performed by: UROLOGY

## 2019-06-11 PROCEDURE — 74430 CONTRAST X-RAY BLADDER: CPT | Mod: 26,,, | Performed by: UROLOGY

## 2019-06-11 PROCEDURE — 51600 INJECTION FOR BLADDER X-RAY: CPT | Mod: TC

## 2019-06-11 PROCEDURE — 25500020 PHARM REV CODE 255: Performed by: UROLOGY

## 2019-06-11 PROCEDURE — 76000 PR  FLUOROSCOPE EXAMINATION: ICD-10-PCS | Mod: 26,59,, | Performed by: UROLOGY

## 2019-06-11 RX ADMIN — IOTHALAMATE MEGLUMINE 120 ML: 172 INJECTION URETERAL at 08:06

## 2019-06-13 NOTE — PROGRESS NOTES
Mr Germain is a 59 y.o. male who presents for repeat cystogram s/p robotic radical prostatectomy.  He underwent RARP, BPLND 5/29/19 and is here today for postop cystogram/jones removal.  He had significant median lobe requiring bladder neck reconstruction and there was small bladder neck anastamotic leak at time of initial cystogram 6/6/19     Cystogram     DATE: 6/11/19     PRE-PROCEDURE DIAGNOSIS: Prostate cancer, status post prostatectomy     POST-PROCEDURE DIAGNOSIS: same     PROCEDURE:  1. Cystogram (with injection of contrast for cystogram)  2. Fluoroscopy <1 hour  3. Interpretation of fluoroscopic images     INDICATIONS: as above     PROCEDURE:   Patient was brought to fluoroscopy suite and placed in supine position on fluoro table.   An AP  film was taken.   Sterile aspirate urine from catheter collected for culture  Cystografin passively instilled into bladder using dual 60cc cath tip syringe system.  Filled to 120cc.  AP image taken noting good bladder contour, with extravasation.   Oblique images taken bilaterally, again with no visualized extravasation, including at area previously seen at  bladder neck    Bladder drained via jones and no residual contrast left behind and again no extravasation of contrast, confirmed in lateral/oblique position          ASSESSMENT   Prostate cancer      Plan    Cystogram negative, jones removed  Pathology discussed - [id number] aiden 3+3 negative margin  Will complete bactrim ppx  Patient otherwise doing very well   Advised on kegel exercises and that lifting restrictions remain in place until 1 month from surgery  Reviewed kegel tehnique and compliance  Also discussed possibility of confounding urgency with UUI given relief of longterm obstruction so advised on avoiding bladder irritants  RTC 1 month

## 2019-06-27 ENCOUNTER — OFFICE VISIT (OUTPATIENT)
Dept: INFECTIOUS DISEASES | Facility: CLINIC | Age: 59
End: 2019-06-27
Payer: COMMERCIAL

## 2019-06-27 VITALS
HEIGHT: 66 IN | SYSTOLIC BLOOD PRESSURE: 128 MMHG | HEART RATE: 113 BPM | WEIGHT: 150 LBS | BODY MASS INDEX: 24.11 KG/M2 | TEMPERATURE: 99 F | OXYGEN SATURATION: 98 % | DIASTOLIC BLOOD PRESSURE: 80 MMHG

## 2019-06-27 DIAGNOSIS — E88.1 LIPODYSTROPHY DUE TO HIV INFECTION AND ANTIRETROVIRAL THERAPY: ICD-10-CM

## 2019-06-27 DIAGNOSIS — Z79.899 LIPODYSTROPHY DUE TO HIV INFECTION AND ANTIRETROVIRAL THERAPY: ICD-10-CM

## 2019-06-27 DIAGNOSIS — C61 PROSTATE CANCER: ICD-10-CM

## 2019-06-27 DIAGNOSIS — F17.200 SMOKER: ICD-10-CM

## 2019-06-27 DIAGNOSIS — B20 LIPODYSTROPHY DUE TO HIV INFECTION AND ANTIRETROVIRAL THERAPY: ICD-10-CM

## 2019-06-27 DIAGNOSIS — B20 HUMAN IMMUNODEFICIENCY VIRUS (HIV) DISEASE: Primary | ICD-10-CM

## 2019-06-27 DIAGNOSIS — E78.00 HIGH CHOLESTEROL: ICD-10-CM

## 2019-06-27 PROCEDURE — 3008F BODY MASS INDEX DOCD: CPT | Mod: ,,, | Performed by: INTERNAL MEDICINE

## 2019-06-27 PROCEDURE — 99214 PR OFFICE/OUTPT VISIT, EST, LEVL IV, 30-39 MIN: ICD-10-PCS | Mod: ,,, | Performed by: INTERNAL MEDICINE

## 2019-06-27 PROCEDURE — 99214 OFFICE O/P EST MOD 30 MIN: CPT | Mod: ,,, | Performed by: INTERNAL MEDICINE

## 2019-06-27 PROCEDURE — 3008F PR BODY MASS INDEX (BMI) DOCUMENTED: ICD-10-PCS | Mod: ,,, | Performed by: INTERNAL MEDICINE

## 2019-06-27 NOTE — PROGRESS NOTES
Subjective:       Patient ID: Jones Germain is a 59 y.o. male.    Chief Complaint:: 6 mos check up    HPI  Discussed prostate cancer diagnosis and options, admits depression. Concerned about requiring catheter, being incontinent, radiation adverse effects. He is planning to do the surgery before summer break so that he will have time to recover.  Did receive his flu vaccine  Converted to generic atripla  Compliant with meds. Still smoking. Has not yet established with primary care. Discussed that he will need clearance for surgery and anesthesia    6/27/19: he was seen by Dr. De La Torre for cardiac clearance. On 5/29 he had robotic prostatectomy, received bactrim post op and had jones for 2 weeks. Requiring no pain meds but has sensitivity over his midline abdominal incision. Tells me he will not require any additional treatment. He feels like he is emptying his bladder well. Stream is good. Rare leak. He stopped his Atripla for a week after the surgery. Very frustrated with the cost of his care and very limited in his finances. He isolates himself      Current Outpatient Medications:     aspirin 81 MG Chew, Take 81 mg by mouth once daily., Disp: , Rfl:     buPROPion (WELLBUTRIN XL) 150 MG TB24 tablet, Take 1 tablet (150 mg total) by mouth every morning., Disp: 30 tablet, Rfl: 6    efavirenz (SUSTIVA) 600 mg Tab, Take 1 tablet (600 mg total) by mouth every evening., Disp: 90 tablet, Rfl: 3    emtricitabine-tenofovir alafen (DESCOVY) 200-25 mg Tab, Take 1 tablet by mouth once daily., Disp: 90 tablet, Rfl: 3    pravastatin (PRAVACHOL) 40 MG tablet, Take 1 tablet (40 mg total) by mouth once daily., Disp: 30 tablet, Rfl: 6  Review of patient's allergies indicates:  No Known Allergies  Past Medical History:   Diagnosis Date    Adverse effect of hepatitis B vaccine     non responder    Hepatitis A 1991    High frequency hearing loss     HIV (human immunodeficiency virus infection) 1995    On COM/cRix until kidney  stones, sallie 200    Hypercholesterolemia     Hyperlipidemia     Kidney stone 2005    [facility], Lithotripsy    Lipoatrophy     Mild    Pertussis     As a child    Prostate cancer     Secondary syphilis 2002    Smoker     Vertigo 02/2018    Due to Ear Infection     Past Surgical History:   Procedure Laterality Date    BIOPSY, PROSTATE, RECTAL APPROACH, WITH US GUIDANCE N/A 9/25/2018    Per[facility] by [facility] at [facility] OR    LITHOTRIPSY      LYMPHADENECTOMY, pelvic Bilateral 5/29/2019    [facility] at [facility] OR    ROBOTIC PROSTATECTOMY N/A 5/29/2019    [facility] at [facility] OR     Social History     Socioeconomic History    Marital status: Single     Spouse name: Not on file    Number of children: Not on file    Years of education: Not on file    Highest education level: Not on file   Occupational History    Occupation: Teacher   Social Needs    Financial resource strain: Not on file    Food insecurity:     Worry: Not on file     Inability: Not on file    Transportation needs:     Medical: Not on file     Non-medical: Not on file   Tobacco Use    Smoking status: Current Every Day Smoker     Packs/day: 1.00     Types: Cigarettes    Smokeless tobacco: Never Used    Tobacco comment: 20 to 30   Substance and Sexual Activity    Alcohol use: No     Frequency: Never    Drug use: No    Sexual activity: Never     Comment: Abstinent   Lifestyle    Physical activity:     Days per week: Not on file     Minutes per session: Not on file    Stress: Not on file   Relationships    Social connections:     Talks on phone: Not on file     Gets together: Not on file     Attends Jainism service: Not on file     Active member of club or organization: Not on file     Attends meetings of clubs or organizations: Not on file     Relationship status: Not on file   Other Topics Concern    Not on file   Social History Narrative    Not on file     Family History  "  Problem Relation Age of Onset    Diabetes Mother          in a MVC    COPD Father     Lung cancer Paternal Grandmother        Travel History:   Vaccine History: Yearly flu vaccine, Pneumovax , , Prevnar , tetanus 2005, Td AP , Zostavax 2016, hepatitis B ×3 2016  Advanced Directive:   Safer Sex: Abstinent  Bone Density: 2011 osteopenia  Colonoscopy:     Review of Systems    Constitutional: No fever, chills, sweats, fatigue, weakness, weight loss    Eyes: No change in vision, loss of vision,  .     ENT: No sinus drainage, sore throat, mouth pain, or lesions.     Cardiovascular: No chest pain, GAVIN, palpitations or pedal edema    Respiratory: No shortness of breath, GAVIN, cough, wheeze, sputum, pleurisy, or hemoptysis. Did reduce his smoking to half pack per day    Gastrointestinal: No abdominal pain, nausea, vomiting, diarrhea, constipation, blood in stool, or focal abd pain    Genitourinary: No dysuria, hematuria, incontinence, frequency, flank pain,      Musculoskeletal: No new pain, joint swelling, or injuries    Integumentary: No new rashes, lesions,   Neurological: No dizziness, vertigo, unusual headaches, neuropathy, or falls    Psychiatric: Denies anxiety, depression, but feels the Wellbutrin did help him reduce his cigarettes. He has anhedonia, but states this is mostly frustration and anger in regards to the cost of his medical care and the constraints of his finances. He has withdrawn socially a bit. No memory loss, sleep disturbance or substance abuse. Denies depression    Endocrine: No diabetes Thyroid normal    Lymphatic: No lymphadenopathy, blood loss, anemia,     Objective:      Blood pressure 128/80, pulse (!) 113, temperature 99 °F (37.2 °C), temperature source Oral, height 5' 6" (1.676 m), weight 68 kg (150 lb), SpO2 98 %. Body mass index is 24.21 kg/m².  Physical Exam      General: Alert and attentive, cooperative and in no distress, aggravated and a little " tachycardic.    Eyes: Pupils equal, round, reactive to light, anicteric, EOMI    Neck: Supple, non-tender, no thyromegaly or masses    ENT: EAC patent, TM normal, nares patent, no oral lesions, teeth in good condition, no thrush    Cardiovascular: Regular rate and rhythm, no murmurs, rubs, or gallop    Respiratory: Lungs clear without wheezes, no rales, rub or rhonci    Gastrointestinal: Active bowel sounds, soft, no mass or organomegaly, no tenderness or distention    Genitourinary: No flank tenderness    Vascular: No peripheral edema, phlebitis, pulses normal. Warm and well perfused    Musculoskeletal: Ambulates without difficulty but moving a little bit slowly today as he had to jam on his brakes while driving here and this dalila his incisions a bit., no acute arthritis, synovitis or myositis. Normal muscle bulk and strength    Integumentary: Skin without rashes, lesions,     AnusRectum:     Neurological: Normal LOC, cranial nerves, speech, reflexes, normal gait    Psychiatric: back to himself but angrier    Lymphatic: No cervical, supraclavicular, axillary, or inguinal lymphadenopathy      Wound:  Surgical wounds all look great.    HIV Table:   HLA-B 5701     TOXOIgG     RPR 12/2018 non reactive   HEP A IgG 6/8/2015 infection/immune   HBsAg 6/8/2015 negative   HBsAb 12/3/2016 negative, non responder   HBcAb     HBeAb     HBeAg     HCVAb 7/8/2018  negative   HBV DNA     HCV RNA     Vitamin D 1/20/2018 40   Chlamydia / GC 12/5/2014 negative   TB Gold  12/2018  negative  PSA   4/25/19  2.7  HIV RNA  12/2018  <20    Recent Diagnostics: Reviewed July lab       Assessment and Plan:           Human immunodeficiency virus (HIV) disease  -     HIV RNA, quantitative, PCR; Future; Expected date: 07/26/2019    High cholesterol    Prostate cancer    Smoker    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

## 2019-07-16 ENCOUNTER — OFFICE VISIT (OUTPATIENT)
Dept: UROLOGY | Facility: CLINIC | Age: 59
End: 2019-07-16
Payer: COMMERCIAL

## 2019-07-16 VITALS
BODY MASS INDEX: 23.59 KG/M2 | DIASTOLIC BLOOD PRESSURE: 91 MMHG | TEMPERATURE: 98 F | WEIGHT: 146.81 LBS | HEIGHT: 66 IN | SYSTOLIC BLOOD PRESSURE: 125 MMHG | HEART RATE: 97 BPM | RESPIRATION RATE: 18 BRPM

## 2019-07-16 DIAGNOSIS — C61 PROSTATE CANCER: Primary | ICD-10-CM

## 2019-07-16 LAB
BILIRUB SERPL-MCNC: ABNORMAL MG/DL
BLOOD URINE, POC: ABNORMAL
COLOR, POC UA: YELLOW
GLUCOSE UR QL STRIP: ABNORMAL
KETONES UR QL STRIP: ABNORMAL
LEUKOCYTE ESTERASE URINE, POC: ABNORMAL
NITRITE, POC UA: ABNORMAL
PH, POC UA: 5
PROTEIN, POC: ABNORMAL
SPECIFIC GRAVITY, POC UA: 1.01
UROBILINOGEN, POC UA: 0.2

## 2019-07-16 PROCEDURE — 99024 POSTOP FOLLOW-UP VISIT: CPT | Mod: S$GLB,,, | Performed by: UROLOGY

## 2019-07-16 PROCEDURE — 99024 PR POST-OP FOLLOW-UP VISIT: ICD-10-PCS | Mod: S$GLB,,, | Performed by: UROLOGY

## 2019-07-16 PROCEDURE — 99999 PR PBB SHADOW E&M-EST. PATIENT-LVL III: ICD-10-PCS | Mod: PBBFAC,,, | Performed by: UROLOGY

## 2019-07-16 PROCEDURE — 99999 PR PBB SHADOW E&M-EST. PATIENT-LVL III: CPT | Mod: PBBFAC,,, | Performed by: UROLOGY

## 2019-07-16 PROCEDURE — 81002 POCT URINE DIPSTICK WITHOUT MICROSCOPE: ICD-10-PCS | Mod: S$GLB,,, | Performed by: UROLOGY

## 2019-07-16 PROCEDURE — 81002 URINALYSIS NONAUTO W/O SCOPE: CPT | Mod: S$GLB,,, | Performed by: UROLOGY

## 2019-07-16 NOTE — PROGRESS NOTES
[facility] Urology Progress Note    Jones Germain is a 59 y.o. male who presents for prostate cancer follow up    Mr Germain is a 58 yo M referred by Dr. Wilder after finding of prostate nodule on ERIBERTO, which I confirmed on exam to left of central sulcus, and he had a PSA of 2.0 He underwent TRUS/biopsy on 9/25/18 revealing a 57.3g gland with 5/15 cores aiden 6 prostate cancer, largely left sided, with small volume on right. He initially elected active surveillance and returned 6 months later with interval psa rise to 2.7 and progression of his bothersome LUTS with AUA SS 17/5. After careful consideration and extensive discussion of management options a, he elected to proceed with robotic assisted radical prostatectomy    5/29/19: 1. Robot-assisted laparoscopic radical prostatectomy, right nerve sparing 2. Bilateral pelvic lymphadenectomy  Findings: moderate sigmoid adhesions, significant median lobe requiring extended bladder neck dissection and necessitating bladder neck reconstruction, no visualized leak after on testing anastamosis   CARLOTTA drain fluid creatinine equivalent with serum POD 1 and CARLOTTA output was minimal and did not increase upon ambulation, and despite bladder neck reconstruction, anastomosis had no intraoperative evidence of urine leak when tested, so CARLOTTA removed   Initial postop cystogram on 6/6/19 had a very small area of anastomotic leak which resolved by repeat cystogram on 6/11/19 at which time Lynne catheter was removed.  PATHOLOGY: [id number] aiden 3+3 negative margin    He returns today noting:  Essentially continent  Leakage is usually just right after urinating if doesn't completely empty  Rare jeana cough sneeze or moving funn  Dry at night  Sleeping through the night - had ntf 3-4x preop.  If bladder full has very normal voiding with good stream  Occasionally has to double void to empty  Has cut back to half pack from 1 on cigarettes - using filters as well  Delayed kegel efforts because sore but  "is now      ROS: A comprehensive 10 system review was performed and is negative except as noted above in HPI    PHYSICAL EXAM:    Vitals:    07/16/19 0808   BP: (!) 125/91   Pulse: 97   Resp: 18   Temp: 98 °F (36.7 °C)     Body mass index is 23.7 kg/m². Weight: 66.6 kg (146 lb 13.2 oz) Height: 5' 6" (167.6 cm)       General: Alert, cooperative, no distress, appears stated age   Head: Normocephalic, without obvious abnormality, atraumatic   Eyes: PERRL, conjunctiva/corneas clear   Lungs: Respirations unlabored   Heart: Warm and well perfused   Abdomen: soft NT ND lap incisions well healed  Extremities: Extremities normal, atraumatic, no cyanosis or edema   Skin: Skin color, texture, turgor normal, no rashes or lesions   Psych: Appropriate   Neurologic: Non-focal       Recent Results (from the past 336 hour(s))   POCT URINE DIPSTICK WITHOUT MICROSCOPE    Collection Time: 07/16/19  8:17 AM   Result Value Ref Range    Color, UA yellow     Spec Grav UA 1.015     pH, UA 5     WBC, UA small     Nitrite, UA neg     Protein neg     Glucose, UA neg     Ketones, UA neg     Urobilinogen, UA 0.2     Bilirubin neg     Blood, UA trace        ASSESSMENT   1. Prostate cancer  POCT URINE DIPSTICK WITHOUT MICROSCOPE    Prostate Specific Antigen, Diagnostic       Plan    He is doing well approximately 6 weeks status post robotic prostatectomy.  He is essentially continent, and we did discuss the importance of continued Kegel exercises as well as discussed appropriate technique and compliance, especially as he increases activity level.  Encouraged smoking cessation.  Pathology again reviewed, favorable complete resection with negative margins.  We did discuss the importance of continued postoperative PSA screening, with the initial postoperative PSA at 3 months postop.  Will continue every 3 months for the 1st 1-2 years then every 6 months out to year 5 then annually.   RTC 2 months with PSA prior. Will review postop ED pathways and " protocols at that visit.

## 2019-09-16 ENCOUNTER — LAB VISIT (OUTPATIENT)
Dept: LAB | Facility: HOSPITAL | Age: 59
End: 2019-09-16
Attending: UROLOGY
Payer: COMMERCIAL

## 2019-09-16 DIAGNOSIS — C61 PROSTATE CANCER: ICD-10-CM

## 2019-09-16 LAB — COMPLEXED PSA SERPL-MCNC: <0.01 NG/ML (ref 0–4)

## 2019-09-16 PROCEDURE — 36415 COLL VENOUS BLD VENIPUNCTURE: CPT

## 2019-09-16 PROCEDURE — 84153 ASSAY OF PSA TOTAL: CPT

## 2019-09-29 NOTE — PROGRESS NOTES
[facility] Urology Progress Note    Jones Germain is a 59 y.o. male who presents for prostate cancer follow up     Mr Germian is a 60 yo M referred by Dr. Wilder after finding of prostate nodule on ERIBERTO, which I confirmed on exam to left of central sulcus, and he had a PSA of 2.0 He underwent TRUS/biopsy on 9/25/18 revealing a 57.3g gland with 5/15 cores aiden 6 prostate cancer, largely left sided, with small volume on right. He initially elected active surveillance and returned 6 months later with interval psa rise to 2.7 and progression of his bothersome LUTS with AUA SS 17/5. After careful consideration and extensive discussion of management options, he elected to proceed with robotic assisted radical prostatectomy     5/29/19: 1. Robot-assisted laparoscopic radical prostatectomy, right nerve sparing 2. Bilateral pelvic lymphadenectomy  Findings: moderate sigmoid adhesions, significant median lobe requiring extended bladder neck dissection and necessitating bladder neck reconstruction, no leak on testing anastamosis   CARLOTTA drain fluid creatinine equivalent with serum POD 1 and CARLOTTA output was minimal and did not increase upon ambulation, and despite bladder neck reconstruction, anastomosis had no intraoperative evidence of urine leak when tested, so CARLOTTA removed   Initial postop cystogram on 6/6/19 had a very small area of anastomotic leak which resolved by repeat cystogram on 6/11/19 at which time Lynne catheter was removed.  PATHOLOGY: [id number] aiden 3+3 negative margin     7/16/19: Essentially continent, Leakage is usually just right after urinating if doesn't completely empty. Rare jeana cough sneeze or moving furniture. Dry at night  Sleeping through the night - had ntf 3-4x preop. If bladder full has very normal voiding with good stream. Occasionally has to double void to empty.  Has cut back to half pack from 1 on cigarettes - using filters as well. Delayed kegel efforts because sore but is now    He returns today  "noting:  PSA 9/16/19 is <0.01  Pad to work for safety. Every day is dry. 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  Only real problem is if he has to run to the bathroom and has limited amount of time to do it on a break, may have some postvoid dribble which is a leak.  No longer having true stress urinary incontinence.  No leak with cough, sneeze, passing flatus.  Still doing Kegel exercises  Pelvic, perineal, testicular soreness has resolved as of 2 weeks ago, feeling well.  Still smoking slightly over half a pack per day  No erections  Has sensation and can climax      ROS: A comprehensive 10 system review was performed and is negative except as noted above in HPI    PHYSICAL EXAM:    Vitals:    09/30/19 1512   BP: 128/83   Pulse: 98   Resp: 18     Body mass index is 23.41 kg/m². Weight: 65.8 kg (145 lb 1 oz) Height: 5' 6" (167.6 cm)       General: Alert, cooperative, no distress, appears stated age   Head: Normocephalic, without obvious abnormality, atraumatic   Eyes: PERRL, conjunctiva/corneas clear   Lungs: Respirations unlabored   Heart: Warm and well perfused   Abdomen:soft NT ND lap incisions well healed.  Extremities: Extremities normal, atraumatic, no cyanosis or edema   Skin: Skin color, texture, turgor normal, no rashes or lesions   Psych: Appropriate   Neurologic: Non-focal       Recent Results (from the past 336 hour(s))   POCT URINE DIPSTICK WITHOUT MICROSCOPE    Collection Time: 09/30/19  3:18 PM   Result Value Ref Range    Color, UA yellow     Spec Grav UA 1.030     pH, UA 5     WBC, UA neg     Nitrite, UA neg     Protein neg     Glucose, UA neg     Ketones, UA neg     Urobilinogen, UA 0.2     Bilirubin small     Blood, UA mod        ASSESSMENT   1. History of prostate cancer  POCT URINE DIPSTICK WITHOUT MICROSCOPE    Prostate Specific Antigen, Diagnostic       Plan    He is doing well 3-4 months status post robotic prostatectomy.  " Pathology was favorable and his PSA is undetectable.  He is continent now.  We did discuss the importance of timed voiding and not holding urine, which is an occupational hazard for him, and this is the only time he runs into trouble or leakage which is some postvoid dribble.  Otherwise urinating well without stress urinary incontinence or urgency.  We did discuss the importance of PSA screening to monitor for recurrence, usually every 3 months for the 1st year, then every 6 months at 2 year 5, then annually.  We did have a risk benefit discussion and I will see him back in 3 months with a PSA prior, and if all is stable in PSA remains undetectable can moved to a 6 month screening schedule.  We did briefly discuss options for post prostatectomy erectile dysfunction including rehabilitative pathways, and on demand medications and devices.  At this time, he is not concerned and we will revisit in the future.  He is not interested in devices, procedures etc, we did discuss general timeline of 6-12 months for recovery of nerve pathways.  We did discuss penile rehabilitation protocols with low-dose PDE 5 inhibitors to help foster this, and will revisit at the next office visit.  RTC 3 mos with psa prior

## 2019-09-30 ENCOUNTER — OFFICE VISIT (OUTPATIENT)
Dept: UROLOGY | Facility: CLINIC | Age: 59
End: 2019-09-30
Payer: COMMERCIAL

## 2019-09-30 VITALS
BODY MASS INDEX: 23.31 KG/M2 | HEART RATE: 98 BPM | DIASTOLIC BLOOD PRESSURE: 83 MMHG | RESPIRATION RATE: 18 BRPM | SYSTOLIC BLOOD PRESSURE: 128 MMHG | WEIGHT: 145.06 LBS | HEIGHT: 66 IN

## 2019-09-30 DIAGNOSIS — Z85.46 HISTORY OF PROSTATE CANCER: Primary | ICD-10-CM

## 2019-09-30 LAB
BILIRUB SERPL-MCNC: ABNORMAL MG/DL
BLOOD URINE, POC: ABNORMAL
COLOR, POC UA: YELLOW
GLUCOSE UR QL STRIP: ABNORMAL
KETONES UR QL STRIP: ABNORMAL
LEUKOCYTE ESTERASE URINE, POC: ABNORMAL
NITRITE, POC UA: ABNORMAL
PH, POC UA: 5
PROTEIN, POC: ABNORMAL
SPECIFIC GRAVITY, POC UA: 1.03
UROBILINOGEN, POC UA: 0.2

## 2019-09-30 PROCEDURE — 99213 OFFICE O/P EST LOW 20 MIN: CPT | Mod: 25,S$GLB,, | Performed by: UROLOGY

## 2019-09-30 PROCEDURE — 81002 POCT URINE DIPSTICK WITHOUT MICROSCOPE: ICD-10-PCS | Mod: S$GLB,,, | Performed by: UROLOGY

## 2019-09-30 PROCEDURE — 99999 PR PBB SHADOW E&M-EST. PATIENT-LVL III: CPT | Mod: PBBFAC,,, | Performed by: UROLOGY

## 2019-09-30 PROCEDURE — 99213 PR OFFICE/OUTPT VISIT, EST, LEVL III, 20-29 MIN: ICD-10-PCS | Mod: 25,S$GLB,, | Performed by: UROLOGY

## 2019-09-30 PROCEDURE — 99999 PR PBB SHADOW E&M-EST. PATIENT-LVL III: ICD-10-PCS | Mod: PBBFAC,,, | Performed by: UROLOGY

## 2019-09-30 PROCEDURE — 81002 URINALYSIS NONAUTO W/O SCOPE: CPT | Mod: S$GLB,,, | Performed by: UROLOGY

## 2019-09-30 PROCEDURE — 3008F BODY MASS INDEX DOCD: CPT | Mod: CPTII,S$GLB,, | Performed by: UROLOGY

## 2019-09-30 PROCEDURE — 3008F PR BODY MASS INDEX (BMI) DOCUMENTED: ICD-10-PCS | Mod: CPTII,S$GLB,, | Performed by: UROLOGY

## 2019-11-19 ENCOUNTER — HOSPITAL ENCOUNTER (OUTPATIENT)
Facility: HOSPITAL | Age: 59
Discharge: LEFT BEFORE DISCHARGE | End: 2019-11-20
Attending: EMERGENCY MEDICINE | Admitting: FAMILY MEDICINE
Payer: COMMERCIAL

## 2019-11-19 ENCOUNTER — CLINICAL SUPPORT (OUTPATIENT)
Dept: CARDIOLOGY | Facility: HOSPITAL | Age: 59
End: 2019-11-19
Attending: FAMILY MEDICINE
Payer: COMMERCIAL

## 2019-11-19 VITALS — WEIGHT: 158.94 LBS | HEIGHT: 66 IN | BODY MASS INDEX: 25.54 KG/M2

## 2019-11-19 DIAGNOSIS — I77.819 AORTIC ECTASIA: Primary | ICD-10-CM

## 2019-11-19 DIAGNOSIS — R07.9 CHEST PAIN: ICD-10-CM

## 2019-11-19 PROBLEM — E78.5 HYPERLIPIDEMIA: Chronic | Status: ACTIVE | Noted: 2019-11-19

## 2019-11-19 PROBLEM — Z72.0 TOBACCO ABUSE: Chronic | Status: ACTIVE | Noted: 2018-12-27

## 2019-11-19 PROBLEM — Z72.0 TOBACCO ABUSE: Status: ACTIVE | Noted: 2018-12-27

## 2019-11-19 LAB
ALBUMIN SERPL BCP-MCNC: 4.9 G/DL (ref 3.5–5.2)
ALP SERPL-CCNC: 74 U/L (ref 55–135)
ALT SERPL W/O P-5'-P-CCNC: 25 U/L (ref 10–44)
ANION GAP SERPL CALC-SCNC: 10 MMOL/L (ref 8–16)
AORTIC ROOT ANNULUS: 4.24 CM
AORTIC VALVE CUSP SEPERATION: 2.24 CM
AST SERPL-CCNC: 22 U/L (ref 10–40)
AV INDEX (PROSTH): 1.18
AV MEAN GRADIENT: 3 MMHG
AV PEAK GRADIENT: 6 MMHG
AV VALVE AREA: 5.41 CM2
AV VELOCITY RATIO: 102.98
BASOPHILS # BLD AUTO: 0.03 K/UL (ref 0–0.2)
BASOPHILS NFR BLD: 0.6 % (ref 0–1.9)
BILIRUB SERPL-MCNC: 0.6 MG/DL (ref 0.1–1)
BNP SERPL-MCNC: 16 PG/ML (ref 0–99)
BSA FOR ECHO PROCEDURE: 1.83 M2
BUN SERPL-MCNC: 13 MG/DL (ref 6–20)
CALCIUM SERPL-MCNC: 9 MG/DL (ref 8.7–10.5)
CHLORIDE SERPL-SCNC: 105 MMOL/L (ref 95–110)
CO2 SERPL-SCNC: 25 MMOL/L (ref 23–29)
CREAT SERPL-MCNC: 0.9 MG/DL (ref 0.5–1.4)
CV ECHO LV RWT: 0.41 CM
D DIMER PPP IA.FEU-MCNC: 0.28 UG/ML FEU
DIFFERENTIAL METHOD: ABNORMAL
DOP CALC AO PEAK VEL: 1.22 M/S
DOP CALC AO VTI: 20.12 CM
DOP CALC LVOT AREA: 4.6 CM2
DOP CALC LVOT DIAMETER: 2.42 CM
DOP CALC LVOT PEAK VEL: 125.64 M/S
DOP CALC LVOT STROKE VOLUME: 108.86 CM3
DOP CALCLVOT PEAK VEL VTI: 23.68 CM
E WAVE DECELERATION TIME: 236.2 MSEC
E/A RATIO: 0.73
E/E' RATIO: 8 M/S
ECHO LV POSTERIOR WALL: 0.92 CM (ref 0.6–1.1)
EOSINOPHIL # BLD AUTO: 0.1 K/UL (ref 0–0.5)
EOSINOPHIL NFR BLD: 1.5 % (ref 0–8)
ERYTHROCYTE [DISTWIDTH] IN BLOOD BY AUTOMATED COUNT: 12.6 % (ref 11.5–14.5)
EST. GFR  (AFRICAN AMERICAN): >60 ML/MIN/1.73 M^2
EST. GFR  (NON AFRICAN AMERICAN): >60 ML/MIN/1.73 M^2
FRACTIONAL SHORTENING: 23 % (ref 28–44)
GLUCOSE SERPL-MCNC: 111 MG/DL (ref 70–110)
HCT VFR BLD AUTO: 45 % (ref 40–54)
HGB BLD-MCNC: 15.7 G/DL (ref 14–18)
IMM GRANULOCYTES # BLD AUTO: 0.01 K/UL (ref 0–0.04)
IMM GRANULOCYTES NFR BLD AUTO: 0.2 % (ref 0–0.5)
INTERVENTRICULAR SEPTUM: 0.91 CM (ref 0.6–1.1)
IVRT: 91.17 MSEC
LEFT ATRIUM SIZE: 2.6 CM
LEFT INTERNAL DIMENSION IN SYSTOLE: 3.47 CM (ref 2.1–4)
LEFT VENTRICLE MASS INDEX: 75 G/M2
LEFT VENTRICULAR INTERNAL DIMENSION IN DIASTOLE: 4.51 CM (ref 3.5–6)
LEFT VENTRICULAR MASS: 136.3 G
LV LATERAL E/E' RATIO: 7 M/S
LV SEPTAL E/E' RATIO: 9.33 M/S
LYMPHOCYTES # BLD AUTO: 1.3 K/UL (ref 1–4.8)
LYMPHOCYTES NFR BLD: 23.8 % (ref 18–48)
MCH RBC QN AUTO: 33.3 PG (ref 27–31)
MCHC RBC AUTO-ENTMCNC: 34.9 G/DL (ref 32–36)
MCV RBC AUTO: 96 FL (ref 82–98)
MONOCYTES # BLD AUTO: 0.4 K/UL (ref 0.3–1)
MONOCYTES NFR BLD: 8.1 % (ref 4–15)
MV PEAK A VEL: 0.77 M/S
MV PEAK E VEL: 0.56 M/S
NEUTROPHILS # BLD AUTO: 3.5 K/UL (ref 1.8–7.7)
NEUTROPHILS NFR BLD: 65.8 % (ref 38–73)
NRBC BLD-RTO: 0 /100 WBC
PISA TR MAX VEL: 2.26 M/S
PLATELET # BLD AUTO: 217 K/UL (ref 150–350)
PMV BLD AUTO: 9.7 FL (ref 9.2–12.9)
POTASSIUM SERPL-SCNC: 4.2 MMOL/L (ref 3.5–5.1)
PROT SERPL-MCNC: 7.2 G/DL (ref 6–8.4)
PV PEAK VELOCITY: 92.89 CM/S
RA PRESSURE: 3 MMHG
RBC # BLD AUTO: 4.71 M/UL (ref 4.6–6.2)
RIGHT VENTRICULAR END-DIASTOLIC DIMENSION: 182 CM
SODIUM SERPL-SCNC: 140 MMOL/L (ref 136–145)
T4 FREE SERPL-MCNC: 0.68 NG/DL (ref 0.71–1.51)
TDI LATERAL: 0.08 M/S
TDI SEPTAL: 0.06 M/S
TDI: 0.07 M/S
TR MAX PG: 20 MMHG
TROPONIN I SERPL DL<=0.01 NG/ML-MCNC: <0.03 NG/ML (ref 0.02–0.04)
TSH SERPL DL<=0.005 MIU/L-ACNC: 1.47 UIU/ML (ref 0.34–5.6)
TV REST PULMONARY ARTERY PRESSURE: 23 MMHG
WBC # BLD AUTO: 5.29 K/UL (ref 3.9–12.7)

## 2019-11-19 PROCEDURE — 99285 EMERGENCY DEPT VISIT HI MDM: CPT | Mod: 25

## 2019-11-19 PROCEDURE — 84443 ASSAY THYROID STIM HORMONE: CPT

## 2019-11-19 PROCEDURE — 93306 TTE W/DOPPLER COMPLETE: CPT

## 2019-11-19 PROCEDURE — 83036 HEMOGLOBIN GLYCOSYLATED A1C: CPT

## 2019-11-19 PROCEDURE — 84484 ASSAY OF TROPONIN QUANT: CPT | Mod: 91

## 2019-11-19 PROCEDURE — G0378 HOSPITAL OBSERVATION PER HR: HCPCS

## 2019-11-19 PROCEDURE — 85379 FIBRIN DEGRADATION QUANT: CPT

## 2019-11-19 PROCEDURE — 93005 ELECTROCARDIOGRAM TRACING: CPT

## 2019-11-19 PROCEDURE — 80053 COMPREHEN METABOLIC PANEL: CPT

## 2019-11-19 PROCEDURE — 83880 ASSAY OF NATRIURETIC PEPTIDE: CPT

## 2019-11-19 PROCEDURE — 25000003 PHARM REV CODE 250: Performed by: EMERGENCY MEDICINE

## 2019-11-19 PROCEDURE — 84439 ASSAY OF FREE THYROXINE: CPT

## 2019-11-19 PROCEDURE — 85025 COMPLETE CBC W/AUTO DIFF WBC: CPT

## 2019-11-19 PROCEDURE — 25000003 PHARM REV CODE 250: Performed by: FAMILY MEDICINE

## 2019-11-19 PROCEDURE — 36415 COLL VENOUS BLD VENIPUNCTURE: CPT

## 2019-11-19 PROCEDURE — 94761 N-INVAS EAR/PLS OXIMETRY MLT: CPT

## 2019-11-19 RX ORDER — ASPIRIN 325 MG
325 TABLET ORAL EVERY 6 HOURS PRN
COMMUNITY
End: 2019-11-19 | Stop reason: CLARIF

## 2019-11-19 RX ORDER — NAPROXEN SODIUM 220 MG/1
81 TABLET, FILM COATED ORAL DAILY
Status: DISCONTINUED | OUTPATIENT
Start: 2019-11-20 | End: 2019-11-20 | Stop reason: HOSPADM

## 2019-11-19 RX ORDER — EFAVIRENZ 200 MG/1
600 CAPSULE ORAL NIGHTLY
Status: DISCONTINUED | OUTPATIENT
Start: 2019-11-19 | End: 2019-11-20 | Stop reason: HOSPADM

## 2019-11-19 RX ORDER — NAPROXEN SODIUM 220 MG/1
162 TABLET, FILM COATED ORAL
Status: COMPLETED | OUTPATIENT
Start: 2019-11-19 | End: 2019-11-19

## 2019-11-19 RX ORDER — SODIUM,POTASSIUM PHOSPHATES 280-250MG
2 POWDER IN PACKET (EA) ORAL
Status: DISCONTINUED | OUTPATIENT
Start: 2019-11-19 | End: 2019-11-20 | Stop reason: HOSPADM

## 2019-11-19 RX ORDER — NAPROXEN SODIUM 220 MG/1
162 TABLET, FILM COATED ORAL
Status: ACTIVE | OUTPATIENT
Start: 2019-11-19 | End: 2019-11-19

## 2019-11-19 RX ORDER — ACETAMINOPHEN 325 MG/1
650 TABLET ORAL EVERY 4 HOURS PRN
Status: DISCONTINUED | OUTPATIENT
Start: 2019-11-19 | End: 2019-11-20 | Stop reason: HOSPADM

## 2019-11-19 RX ORDER — PRAVASTATIN SODIUM 40 MG/1
40 TABLET ORAL NIGHTLY
Status: DISCONTINUED | OUTPATIENT
Start: 2019-11-19 | End: 2019-11-20 | Stop reason: HOSPADM

## 2019-11-19 RX ORDER — IPRATROPIUM BROMIDE AND ALBUTEROL SULFATE 2.5; .5 MG/3ML; MG/3ML
3 SOLUTION RESPIRATORY (INHALATION) EVERY 6 HOURS PRN
Status: DISCONTINUED | OUTPATIENT
Start: 2019-11-19 | End: 2019-11-20 | Stop reason: HOSPADM

## 2019-11-19 RX ORDER — BUPROPION HYDROCHLORIDE 150 MG/1
150 TABLET ORAL EVERY MORNING
Status: DISCONTINUED | OUTPATIENT
Start: 2019-11-19 | End: 2019-11-20 | Stop reason: HOSPADM

## 2019-11-19 RX ORDER — GLUCAGON 1 MG
1 KIT INJECTION
Status: DISCONTINUED | OUTPATIENT
Start: 2019-11-19 | End: 2019-11-20 | Stop reason: HOSPADM

## 2019-11-19 RX ORDER — IBUPROFEN 200 MG
16 TABLET ORAL
Status: DISCONTINUED | OUTPATIENT
Start: 2019-11-19 | End: 2019-11-20 | Stop reason: HOSPADM

## 2019-11-19 RX ORDER — IBUPROFEN 200 MG
24 TABLET ORAL
Status: DISCONTINUED | OUTPATIENT
Start: 2019-11-19 | End: 2019-11-20 | Stop reason: HOSPADM

## 2019-11-19 RX ORDER — NITROGLYCERIN 0.4 MG/1
0.4 TABLET SUBLINGUAL EVERY 5 MIN PRN
Status: DISCONTINUED | OUTPATIENT
Start: 2019-11-19 | End: 2019-11-20 | Stop reason: HOSPADM

## 2019-11-19 RX ORDER — POTASSIUM CHLORIDE 20 MEQ/15ML
40 SOLUTION ORAL
Status: DISCONTINUED | OUTPATIENT
Start: 2019-11-19 | End: 2019-11-20 | Stop reason: HOSPADM

## 2019-11-19 RX ORDER — SODIUM CHLORIDE 0.9 % (FLUSH) 0.9 %
2 SYRINGE (ML) INJECTION
Status: DISCONTINUED | OUTPATIENT
Start: 2019-11-19 | End: 2019-11-20 | Stop reason: HOSPADM

## 2019-11-19 RX ORDER — LANOLIN ALCOHOL/MO/W.PET/CERES
800 CREAM (GRAM) TOPICAL
Status: DISCONTINUED | OUTPATIENT
Start: 2019-11-19 | End: 2019-11-20 | Stop reason: HOSPADM

## 2019-11-19 RX ORDER — ENOXAPARIN SODIUM 100 MG/ML
40 INJECTION SUBCUTANEOUS EVERY 24 HOURS
Status: DISCONTINUED | OUTPATIENT
Start: 2019-11-19 | End: 2019-11-20 | Stop reason: HOSPADM

## 2019-11-19 RX ORDER — POTASSIUM CHLORIDE 20 MEQ/15ML
20 SOLUTION ORAL
Status: DISCONTINUED | OUTPATIENT
Start: 2019-11-19 | End: 2019-11-20 | Stop reason: HOSPADM

## 2019-11-19 RX ORDER — ASPIRIN 325 MG
325 TABLET ORAL
Status: ACTIVE | OUTPATIENT
Start: 2019-11-19 | End: 2019-11-19

## 2019-11-19 RX ORDER — POLYETHYLENE GLYCOL 3350 17 G/17G
17 POWDER, FOR SOLUTION ORAL DAILY
Status: DISCONTINUED | OUTPATIENT
Start: 2019-11-19 | End: 2019-11-20 | Stop reason: HOSPADM

## 2019-11-19 RX ORDER — ONDANSETRON 2 MG/ML
4 INJECTION INTRAMUSCULAR; INTRAVENOUS EVERY 8 HOURS PRN
Status: DISCONTINUED | OUTPATIENT
Start: 2019-11-19 | End: 2019-11-20 | Stop reason: HOSPADM

## 2019-11-19 RX ORDER — ACETAMINOPHEN 325 MG/1
650 TABLET ORAL EVERY 8 HOURS PRN
Status: DISCONTINUED | OUTPATIENT
Start: 2019-11-19 | End: 2019-11-20 | Stop reason: HOSPADM

## 2019-11-19 RX ADMIN — PRAVASTATIN SODIUM 40 MG: 40 TABLET ORAL at 09:11

## 2019-11-19 RX ADMIN — NITROGLYCERIN 0.5 INCH: 20 OINTMENT TOPICAL at 09:11

## 2019-11-19 RX ADMIN — ASPIRIN 81 MG 162 MG: 81 TABLET ORAL at 06:11

## 2019-11-19 NOTE — ED NOTES
C/o chest pain starting around 4am states worse with deep breath and cough, denies chest pain at this time states comes and goes when breathing at times

## 2019-11-19 NOTE — HPI
59-year-old male history of HIV, hyperlipidemia comes in for chest pain.  Patient reports that he was in usual health yesterday without any acute signs of illness when he woke up this morning and was having cigarette and coffee insert developing substernal left-sided chest tightness that radiated around his chest wall.  Pain is 4/10.  Denies any exacerbating or alleviating factors.  Denies any nausea, vomiting, fever, chills, shortness of breath, diaphoresis.  Patient reports that he is on multiple HIV medications and has heard of many side effects.  Patient became concerned with his chest pain and came to the ED for further evaluation.  Of note, patient reports that he had angiogram done about 15 years ago that was unremarkable.    In the ED, patient received nitro with some relief.  Continues to have chest pain during my interview.

## 2019-11-19 NOTE — SUBJECTIVE & OBJECTIVE
Past Medical History:   Diagnosis Date    Adverse effect of hepatitis B vaccine     non responder    Hepatitis A 1991    High frequency hearing loss     HIV (human immunodeficiency virus infection) 1995    On COM/cRix until kidney stones, sallie 200    Hypercholesterolemia     Hyperlipidemia     Kidney stone 2005    [facility], Lithotripsy    Lipoatrophy     Mild    Pertussis     As a child    Prostate cancer     Secondary syphilis 2002    Smoker     Vertigo 02/2018    Due to Ear Infection       Past Surgical History:   Procedure Laterality Date    LITHOTRIPSY      ROBOT-ASSISTED LAPAROSCOPIC PROSTATECTOMY N/A 5/29/2019    Procedure: ROBOTIC PROSTATECTOMY;  Surgeon: Baljinder Johnson MD;  Location: [facility] OR;  Service: Urology;  Laterality: N/A;    TRANSRECTAL BIOPSY OF PROSTATE WITH ULTRASOUND GUIDANCE N/A 9/25/2018    Procedure: BIOPSY, PROSTATE, RECTAL APPROACH, WITH US GUIDANCE;  Surgeon: Baljinder Johnson MD;  Location: [facility] OR;  Service: Urology;  Laterality: N/A;       Review of patient's allergies indicates:  No Known Allergies    No current facility-administered medications on file prior to encounter.      Current Outpatient Medications on File Prior to Encounter   Medication Sig    aspirin 81 MG Chew Take 81 mg by mouth once daily.     efavirenz (SUSTIVA) 600 mg Tab Take 1 tablet (600 mg total) by mouth every evening.    emtricitabine-tenofovir alafen (DESCOVY) 200-25 mg Tab Take 1 tablet by mouth once daily. (Patient taking differently: Take 1 tablet by mouth every evening. )    pravastatin (PRAVACHOL) 40 MG tablet Take 1 tablet (40 mg total) by mouth once daily. (Patient taking differently: Take 40 mg by mouth every evening. )    [DISCONTINUED] aspirin 325 MG tablet Take 325 mg by mouth every 6 (six) hours as needed for Pain.    buPROPion (WELLBUTRIN XL) 150 MG TB24 tablet Take 1 tablet (150 mg total) by mouth every morning.     Family History     Problem Relation (Age of Onset)    COPD  Father    Diabetes Mother    Lung cancer Paternal Grandmother        Tobacco Use    Smoking status: Current Every Day Smoker     Packs/day: 1.00     Types: Cigarettes    Smokeless tobacco: Never Used    Tobacco comment: 20 to 30   Substance and Sexual Activity    Alcohol use: No     Frequency: Never    Drug use: No    Sexual activity: Never     Comment: Abstinent     Review of Systems   Constitutional: Negative for chills, fatigue, fever and unexpected weight change.   HENT: Negative for ear pain, rhinorrhea, sneezing and sore throat.    Eyes: Negative for visual disturbance.   Respiratory: Negative for cough, chest tightness and shortness of breath.    Cardiovascular: Positive for chest pain. Negative for palpitations.   Gastrointestinal: Negative for abdominal pain, constipation, diarrhea, nausea and vomiting.   Endocrine: Negative for polyuria.   Genitourinary: Negative for dysuria and hematuria.   Neurological: Negative for seizures and headaches.     Objective:     Vital Signs (Most Recent):  Temp: 98.2 °F (36.8 °C) (11/19/19 1557)  Pulse: 74 (11/19/19 1630)  Resp: 20 (11/19/19 1630)  BP: 119/71 (11/19/19 1557)  SpO2: 98 % (11/19/19 1630) Vital Signs (24h Range):  Temp:  [97.7 °F (36.5 °C)-98.7 °F (37.1 °C)] 98.2 °F (36.8 °C)  Pulse:  [] 74  Resp:  [15-20] 20  SpO2:  [94 %-100 %] 98 %  BP: (119-182)/(71-92) 119/71     Weight: 72.1 kg (158 lb 15.2 oz)  Body mass index is 25.66 kg/m².    Physical Exam   Constitutional: He is oriented to person, place, and time. He appears well-developed and well-nourished. No distress.   HENT:   Head: Normocephalic and atraumatic.   Right Ear: External ear normal.   Left Ear: External ear normal.   Nose: Nose normal.   Mouth/Throat: Oropharynx is clear and moist. No oropharyngeal exudate.   Eyes: Pupils are equal, round, and reactive to light. Conjunctivae and EOM are normal. Right eye exhibits no discharge. Left eye exhibits no discharge. No scleral icterus.   Neck:  Normal range of motion. Neck supple. No thyromegaly present.   Cardiovascular: Normal rate, regular rhythm, normal heart sounds and intact distal pulses. Exam reveals no gallop and no friction rub.   No murmur heard.  Pulmonary/Chest: Effort normal and breath sounds normal. No respiratory distress. He has no wheezes. He has no rales. He exhibits no tenderness.   Abdominal: Soft. Bowel sounds are normal. He exhibits no distension and no mass. There is no tenderness. There is no rebound and no guarding. No hernia.   Musculoskeletal: Normal range of motion. He exhibits no edema or tenderness.   Lymphadenopathy:     He has no cervical adenopathy.   Neurological: He is alert and oriented to person, place, and time.   Skin: Skin is warm. He is not diaphoretic.   Psychiatric: He has a normal mood and affect. His behavior is normal. Judgment and thought content normal.   Nursing note and vitals reviewed.        CRANIAL NERVES     CN III, IV, VI   Pupils are equal, round, and reactive to light.  Extraocular motions are normal.        Significant Labs:   CBC:   Recent Labs   Lab 11/19/19  0635   WBC 5.29   HGB 15.7   HCT 45.0        CMP:   Recent Labs   Lab 11/19/19  0635      K 4.2      CO2 25   *   BUN 13   CREATININE 0.9   CALCIUM 9.0   PROT 7.2   ALBUMIN 4.9   BILITOT 0.6   ALKPHOS 74   AST 22   ALT 25   ANIONGAP 10   EGFRNONAA >60.0     Cardiac Markers:   Recent Labs   Lab 11/19/19  0635   BNP 16     Troponin:   Recent Labs   Lab 11/19/19  0635 11/19/19  1222   TROPONINI <0.030 <0.030     TSH:   Recent Labs   Lab 11/19/19  1222   TSH 1.470       Significant Imaging:   CXR 1 view  Impression:       No acute cardiopulmonary abnormality.

## 2019-11-19 NOTE — H&P
[facility]  History & Physical    Patient Name: Jones Germain  MRN: [id number]  Admission Date: 11/19/2019  Attending Physician: Baljinder Castro MD   Primary Care Provider: Ninfa Ulloa MD         Patient information was obtained from patient and ER records.     Subjective:     Principal Problem:Chest pain    Chief Complaint:   Chief Complaint   Patient presents with    Chest Pain     began at 4 AM with chest tightness        HPI: 59-year-old male history of HIV, hyperlipidemia comes in for chest pain.  Patient reports that he was in usual health yesterday without any acute signs of illness when he woke up this morning and was having cigarette and coffee insert developing substernal left-sided chest tightness that radiated around his chest wall.  Pain is 4/10.  Denies any exacerbating or alleviating factors.  Denies any nausea, vomiting, fever, chills, shortness of breath, diaphoresis.  Patient reports that he is on multiple HIV medications and has heard of many side effects.  Patient became concerned with his chest pain and came to the ED for further evaluation.  Of note, patient reports that he had angiogram done about 15 years ago that was unremarkable.    In the ED, patient received nitro with some relief.  Continues to have chest pain during my interview.    Past Medical History:   Diagnosis Date    Adverse effect of hepatitis B vaccine     non responder    Hepatitis A 1991    High frequency hearing loss     HIV (human immunodeficiency virus infection) 1995    On COM/cRix until kidney stones, sallie 200    Hypercholesterolemia     Hyperlipidemia     Kidney stone 2005    [facility], Lithotripsy    Lipoatrophy     Mild    Pertussis     As a child    Prostate cancer     Secondary syphilis 2002    Smoker     Vertigo 02/2018    Due to Ear Infection       Past Surgical History:   Procedure Laterality Date    LITHOTRIPSY      ROBOT-ASSISTED LAPAROSCOPIC PROSTATECTOMY N/A  5/29/2019    Procedure: ROBOTIC PROSTATECTOMY;  Surgeon: Baljinder Johnson MD;  Location: [facility] OR;  Service: Urology;  Laterality: N/A;    TRANSRECTAL BIOPSY OF PROSTATE WITH ULTRASOUND GUIDANCE N/A 9/25/2018    Procedure: BIOPSY, PROSTATE, RECTAL APPROACH, WITH US GUIDANCE;  Surgeon: Baljinder Johnson MD;  Location: [facility] OR;  Service: Urology;  Laterality: N/A;       Review of patient's allergies indicates:  No Known Allergies    No current facility-administered medications on file prior to encounter.      Current Outpatient Medications on File Prior to Encounter   Medication Sig    aspirin 81 MG Chew Take 81 mg by mouth once daily.     efavirenz (SUSTIVA) 600 mg Tab Take 1 tablet (600 mg total) by mouth every evening.    emtricitabine-tenofovir alafen (DESCOVY) 200-25 mg Tab Take 1 tablet by mouth once daily. (Patient taking differently: Take 1 tablet by mouth every evening. )    pravastatin (PRAVACHOL) 40 MG tablet Take 1 tablet (40 mg total) by mouth once daily. (Patient taking differently: Take 40 mg by mouth every evening. )    [DISCONTINUED] aspirin 325 MG tablet Take 325 mg by mouth every 6 (six) hours as needed for Pain.    buPROPion (WELLBUTRIN XL) 150 MG TB24 tablet Take 1 tablet (150 mg total) by mouth every morning.     Family History     Problem Relation (Age of Onset)    COPD Father    Diabetes Mother    Lung cancer Paternal Grandmother        Tobacco Use    Smoking status: Current Every Day Smoker     Packs/day: 1.00     Types: Cigarettes    Smokeless tobacco: Never Used    Tobacco comment: 20 to 30   Substance and Sexual Activity    Alcohol use: No     Frequency: Never    Drug use: No    Sexual activity: Never     Comment: Abstinent     Review of Systems   Constitutional: Negative for chills, fatigue, fever and unexpected weight change.   HENT: Negative for ear pain, rhinorrhea, sneezing and sore throat.    Eyes: Negative for visual disturbance.   Respiratory: Negative for cough,  chest tightness and shortness of breath.    Cardiovascular: Positive for chest pain. Negative for palpitations.   Gastrointestinal: Negative for abdominal pain, constipation, diarrhea, nausea and vomiting.   Endocrine: Negative for polyuria.   Genitourinary: Negative for dysuria and hematuria.   Neurological: Negative for seizures and headaches.     Objective:     Vital Signs (Most Recent):  Temp: 98.2 °F (36.8 °C) (11/19/19 1557)  Pulse: 74 (11/19/19 1630)  Resp: 20 (11/19/19 1630)  BP: 119/71 (11/19/19 1557)  SpO2: 98 % (11/19/19 1630) Vital Signs (24h Range):  Temp:  [97.7 °F (36.5 °C)-98.7 °F (37.1 °C)] 98.2 °F (36.8 °C)  Pulse:  [] 74  Resp:  [15-20] 20  SpO2:  [94 %-100 %] 98 %  BP: (119-182)/(71-92) 119/71     Weight: 72.1 kg (158 lb 15.2 oz)  Body mass index is 25.66 kg/m².    Physical Exam   Constitutional: He is oriented to person, place, and time. He appears well-developed and well-nourished. No distress.   HENT:   Head: Normocephalic and atraumatic.   Right Ear: External ear normal.   Left Ear: External ear normal.   Nose: Nose normal.   Mouth/Throat: Oropharynx is clear and moist. No oropharyngeal exudate.   Eyes: Pupils are equal, round, and reactive to light. Conjunctivae and EOM are normal. Right eye exhibits no discharge. Left eye exhibits no discharge. No scleral icterus.   Neck: Normal range of motion. Neck supple. No thyromegaly present.   Cardiovascular: Normal rate, regular rhythm, normal heart sounds and intact distal pulses. Exam reveals no gallop and no friction rub.   No murmur heard.  Pulmonary/Chest: Effort normal and breath sounds normal. No respiratory distress. He has no wheezes. He has no rales. He exhibits no tenderness.   Abdominal: Soft. Bowel sounds are normal. He exhibits no distension and no mass. There is no tenderness. There is no rebound and no guarding. No hernia.   Musculoskeletal: Normal range of motion. He exhibits no edema or tenderness.   Lymphadenopathy:     He  has no cervical adenopathy.   Neurological: He is alert and oriented to person, place, and time.   Skin: Skin is warm. He is not diaphoretic.   Psychiatric: He has a normal mood and affect. His behavior is normal. Judgment and thought content normal.   Nursing note and vitals reviewed.        CRANIAL NERVES     CN III, IV, VI   Pupils are equal, round, and reactive to light.  Extraocular motions are normal.        Significant Labs:   CBC:   Recent Labs   Lab 11/19/19  0635   WBC 5.29   HGB 15.7   HCT 45.0        CMP:   Recent Labs   Lab 11/19/19  0635      K 4.2      CO2 25   *   BUN 13   CREATININE 0.9   CALCIUM 9.0   PROT 7.2   ALBUMIN 4.9   BILITOT 0.6   ALKPHOS 74   AST 22   ALT 25   ANIONGAP 10   EGFRNONAA >60.0     Cardiac Markers:   Recent Labs   Lab 11/19/19  0635   BNP 16     Troponin:   Recent Labs   Lab 11/19/19  0635 11/19/19  1222   TROPONINI <0.030 <0.030     TSH:   Recent Labs   Lab 11/19/19  1222   TSH 1.470       Significant Imaging:   CXR 1 view  Impression:       No acute cardiopulmonary abnormality.       EKG  My personal interpretation: No acute ST elevation or depression appreciated    Assessment/Plan:     Active Hospital Problems    Diagnosis    *Chest pain    Hyperlipidemia    Tobacco abuse     40 pack years      HIV (human immunodeficiency virus infection)     On COM/cRix until kidney stones, sallie 200         * Chest pain  Trend CE  ASA, statin  Lipid, HbA1C, TSH/T4 pending  2D echo pending  Stress test in AM  cw home meds       Tobacco abuse  1 PPD (40 pack years)  May benefit from PFT outpatient        VTE Risk Mitigation (From admission, onward)         Ordered     enoxaparin injection 40 mg  Daily      11/19/19 1213     IP VTE HIGH RISK PATIENT  Once      11/19/19 1213                   Baljinder Castro MD  Department of Hospital Medicine   [facility]  Date of service: 11/19/2019 5:22 PM

## 2019-11-19 NOTE — ASSESSMENT & PLAN NOTE
Trend CE  ASA, statin  Lipid, HbA1C, TSH/T4 pending  2D echo pending  Stress test in AM  cw home meds

## 2019-11-19 NOTE — ED NOTES
Called pharmacy for medications not in pyxis, notified not in pyxis and that received room number to send to floor

## 2019-11-20 ENCOUNTER — CLINICAL SUPPORT (OUTPATIENT)
Dept: CARDIOLOGY | Facility: HOSPITAL | Age: 59
End: 2019-11-20
Attending: FAMILY MEDICINE
Payer: COMMERCIAL

## 2019-11-20 VITALS
DIASTOLIC BLOOD PRESSURE: 65 MMHG | HEIGHT: 66 IN | TEMPERATURE: 98 F | OXYGEN SATURATION: 96 % | HEART RATE: 109 BPM | SYSTOLIC BLOOD PRESSURE: 142 MMHG | WEIGHT: 146.19 LBS | RESPIRATION RATE: 20 BRPM | BODY MASS INDEX: 23.49 KG/M2

## 2019-11-20 PROBLEM — R07.9 CHEST PAIN: Status: RESOLVED | Noted: 2019-11-19 | Resolved: 2019-11-20

## 2019-11-20 PROBLEM — I77.819 AORTIC ECTASIA: Status: ACTIVE | Noted: 2019-11-20

## 2019-11-20 LAB
CHOLEST SERPL-MCNC: 197 MG/DL (ref 120–199)
CHOLEST/HDLC SERPL: 7 {RATIO} (ref 2–5)
ESTIMATED AVG GLUCOSE: 91 MG/DL (ref 68–131)
HBA1C MFR BLD HPLC: 4.8 % (ref 4.5–6.2)
HDLC SERPL-MCNC: 28 MG/DL (ref 40–75)
HDLC SERPL: 14.2 % (ref 20–50)
LDLC SERPL CALC-MCNC: 114.6 MG/DL (ref 63–159)
NONHDLC SERPL-MCNC: 169 MG/DL
TRIGL SERPL-MCNC: 272 MG/DL (ref 30–150)

## 2019-11-20 PROCEDURE — 80061 LIPID PANEL: CPT

## 2019-11-20 PROCEDURE — G0378 HOSPITAL OBSERVATION PER HR: HCPCS

## 2019-11-20 PROCEDURE — 25500020 PHARM REV CODE 255: Performed by: FAMILY MEDICINE

## 2019-11-20 PROCEDURE — 93320 DOPPLER ECHO COMPLETE: CPT

## 2019-11-20 PROCEDURE — 36415 COLL VENOUS BLD VENIPUNCTURE: CPT

## 2019-11-20 PROCEDURE — 25000003 PHARM REV CODE 250: Performed by: FAMILY MEDICINE

## 2019-11-20 RX ORDER — NITROGLYCERIN 0.4 MG/1
TABLET SUBLINGUAL
Qty: 15 TABLET | Refills: 0 | Status: SHIPPED | OUTPATIENT
Start: 2019-11-20

## 2019-11-20 RX ADMIN — BUPROPION HYDROCHLORIDE 150 MG: 150 TABLET, FILM COATED, EXTENDED RELEASE ORAL at 09:11

## 2019-11-20 RX ADMIN — IOHEXOL 100 ML: 350 INJECTION, SOLUTION INTRAVENOUS at 11:11

## 2019-11-20 RX ADMIN — ASPIRIN 81 MG 81 MG: 81 TABLET ORAL at 09:11

## 2019-11-20 NOTE — CARE UPDATE
11/19/19 2045   Patient Assessment/Suction   Level of Consciousness (AVPU) alert   Respiratory Effort Unlabored   Expansion/Accessory Muscles/Retractions no retractions   All Lung Fields Breath Sounds clear;equal bilaterally   Cough Frequency no cough   PRE-TX-O2   O2 Device (Oxygen Therapy) room air   SpO2 96 %   Pulse Oximetry Type Continuous   Pulse 87   Resp 14   Positioning   Head of Bed (HOB) HOB at 30 degrees

## 2019-11-20 NOTE — DISCHARGE SUMMARY
[facility]  Discharge Summary      Patient Name: Jones Germain  MRN: [id number]  Admission Date: 11/19/2019  Discharge Date and Time: 11/20/2019  3:49 PM  Attending Physician: Pauline Martinez MD   Discharging Provider: Pauline Martinez MD  Primary Care Provider: Ninfa Ulloa MD    HPI:     59-year-old male history of HIV, hyperlipidemia comes in for chest pain.  Patient reports that he was in usual health yesterday without any acute signs of illness when he woke up this morning and was having cigarette and coffee insert developing substernal left-sided chest tightness that radiated around his chest wall.  Pain is 4/10.  Denies any exacerbating or alleviating factors.  Denies any nausea, vomiting, fever, chills, shortness of breath, diaphoresis.  Patient reports that he is on multiple HIV medications and has heard of many side effects.  Patient became concerned with his chest pain and came to the ED for further evaluation.  Of note, patient reports that he had angiogram done about 15 years ago that was unremarkable.    In the ED, patient received nitro with some relief.  Continues to have chest pain during my interview.    * No surgery found *      Hospital Course:     Pt admitted for chest pain.  Troponin trended and negative.  Echo with nl fxn.  Stress echo was negative for ischemia.  EKG with sinus tachycardia.  Cardiac calcium scoring was 60, and also revealed ascending thoracic aorta ectasia.  Further evaluation with CTA chest revealed arch with 3.1cm in transverse diameter with descending thoaracic aorta 2.8cm.  There was no evidence of dissection.      Mural thrombus formation was noted.  Cardiology was consulted for further evaluation and recommendations.    Pt had previously told me that he wanted to leave immediately out of concern for his cats at home.  We discussed risks of him leaving without further evaluation, including risks of CVA and death.  CTA results with mural  "thombus and aortic dilation were discussed with pt.  At first he decided to stay for evaluation by cardiology.  But then I received a page that pt had left AMA.    DISCHARGE PHYSICAL EXAM  BP (!) 142/65 (BP Location: Left arm, Patient Position: Lying)   Pulse 109   Temp 98.2 °F (36.8 °C) (Oral)   Resp 20   Ht 5' 6" (1.676 m)   Wt 66.3 kg (146 lb 2.6 oz)   SpO2 96%   BMI 23.59 kg/m²   Gen: alert, responsive  HEENT:  Eyes - no pallor  External ears with no lesions  Nares patent  Mouth - lips chapped  CV: RRR  Lungs: CTA B/L  Abd: +BS, soft, NT, ND  Ext: no atrophy or edema  Skin: warm, dry  Neuro: grossly intact  Psych: pleasant    Consults:   cardiology    Final Active Diagnoses:    Diagnosis Date Noted POA    Aortic ectasia [I77.819] 11/20/2019 Yes    Hyperlipidemia [E78.5] 11/19/2019 Yes     Chronic    Tobacco abuse [Z72.0] 12/27/2018 Yes     Chronic    HIV (human immunodeficiency virus infection) [B20] 01/01/1995 Yes     Chronic      Problems Resolved During this Admission:    Diagnosis Date Noted Date Resolved POA    PRINCIPAL PROBLEM:  Chest pain [R07.9] 11/19/2019 11/20/2019 Yes     Discharged Condition: against medical advice    Disposition: Left Against Medical Adv*    Follow Up:  Follow-up Information     Schedule an appointment as soon as possible for a visit with Ninfa Ulloa MD.    Specialty:  Internal Medicine  Why:  for hospital discharge follow-up for chest pain  Contact information:  [address] [facility] [id number]  [phone]                 Patient Instructions:      Ambulatory Referral to Cardiology   Referral Priority: Routine Referral Type: Consultation   Referral Reason: Specialty Services Required   Referred to Provider: ROHIT GARCIA Requested Specialty: Cardiology   Number of Visits Requested: 1       Pending Diagnostic Studies:     None         Medications:  Reconciled Home Medications:      Medication List      START taking these medications    nitroGLYCERIN 0.4 MG SL " tablet  Commonly known as:  NITROSTAT  If you have chest pain, place 1 tablet under tongue every 5 min as needed for 3 doses.  Then call your doctor afterwards        CHANGE how you take these medications    emtricitabine-tenofovir alafen 200-25 mg Tab  Commonly known as:  Descovy  Take 1 tablet by mouth once daily.  What changed:  when to take this     pravastatin 40 MG tablet  Commonly known as:  PRAVACHOL  Take 1 tablet (40 mg total) by mouth once daily.  What changed:  when to take this        CONTINUE taking these medications    aspirin 81 MG Chew  Take 81 mg by mouth once daily.     buPROPion 150 MG TB24 tablet  Commonly known as:  Wellbutrin XL  Take 1 tablet (150 mg total) by mouth every morning.     efavirenz 600 mg Tab  Commonly known as:  SUSTIVA  Take 1 tablet (600 mg total) by mouth every evening.            Indwelling Lines/Drains at time of discharge:   Lines/Drains/Airways     None                 Pauline Martinez MD  Department of Hospital Medicine  [facility]

## 2019-11-20 NOTE — PLAN OF CARE
11/20/19 1108   Discharge Assessment   Assessment Type Discharge Planning Assessment   Confirmed/corrected address and phone number on facesheet? Yes   Assessment information obtained from? Patient   Communicated expected length of stay with patient/caregiver yes   Prior to hospitilization cognitive status: Alert/Oriented   Prior to hospitalization functional status: Independent   Current cognitive status: Alert/Oriented   Current Functional Status: Independent   Lives With alone   Able to Return to Prior Arrangements yes   Is patient able to care for self after discharge? Yes   Who are your caregiver(s) and their phone number(s)? Pt's emergency contact is his friend, Laine Alegria [phone]   Patient's perception of discharge disposition home or selfcare   Readmission Within the Last 30 Days no previous admission in last 30 days   Patient currently being followed by outpatient case management? No   Patient currently receives any other outside agency services? No   Equipment Currently Used at Home none   Do you have any problems affording any of your prescribed medications? No   Is the patient taking medications as prescribed? yes   Does the patient have transportation home? Yes   Transportation Anticipated car, drives self   Does the patient receive services at the [facility]? No   Discharge Plan A Home   Discharge Plan B Home   DME Needed Upon Discharge  none   Patient/Family in Agreement with Plan yes

## 2019-11-20 NOTE — NURSING
"Patient requesting to sign AMA form. States "I'm not waiting here any longer for the cardiologist. I need to leave." MD notified. AMA form signed by patient.  "

## 2019-11-20 NOTE — PROGRESS NOTES
"Cardiac Rehab     Jones Germain   [id number]   11/20/2019         Cardiac Rehab Phase Taught: Phase 1    Teaching Method: Verbal    Handouts: None    Educational Videos: None    Understanding:  Resistant to education    Comments: Education on stress test and CAD risk factors. Pt not interested in education and wants to go home. States " if stress test in positive, I will still go home and not  have an angiogram.    Total Time Spent: 15 mins            Isabel Berry RN         "

## 2019-11-21 LAB
BSA FOR ECHO PROCEDURE: 1.83 M2
CV STRESS BASE HR: 90 BPM
DIASTOLIC BLOOD PRESSURE: 79 MMHG
OHS CV CPX 85 PERCENT MAX PREDICTED HEART RATE MALE: 136
OHS CV CPX ESTIMATED METS: 9
OHS CV CPX MAX PREDICTED HEART RATE: 160
OHS CV CPX PATIENT IS FEMALE: 0
OHS CV CPX PATIENT IS MALE: 1
OHS CV CPX PEAK DIASTOLIC BLOOD PRESSURE: 100 MMHG
OHS CV CPX PEAK HEAR RATE: 163 BPM
OHS CV CPX PEAK RATE PRESSURE PRODUCT: NORMAL
OHS CV CPX PEAK SYSTOLIC BLOOD PRESSURE: 197 MMHG
OHS CV CPX PERCENT MAX PREDICTED HEART RATE ACHIEVED: 102
OHS CV CPX RATE PRESSURE PRODUCT PRESENTING: NORMAL
STRESS ECHO POST EXERCISE DUR MIN: 7 MINUTES
STRESS ECHO POST EXERCISE DUR SEC: 0 SECONDS
SYSTOLIC BLOOD PRESSURE: 137 MMHG

## 2019-12-30 ENCOUNTER — OFFICE VISIT (OUTPATIENT)
Dept: INFECTIOUS DISEASES | Facility: CLINIC | Age: 59
End: 2019-12-30
Payer: COMMERCIAL

## 2019-12-30 VITALS
TEMPERATURE: 98 F | HEIGHT: 66 IN | BODY MASS INDEX: 24.11 KG/M2 | DIASTOLIC BLOOD PRESSURE: 96 MMHG | OXYGEN SATURATION: 100 % | SYSTOLIC BLOOD PRESSURE: 152 MMHG | WEIGHT: 150 LBS | HEART RATE: 114 BPM

## 2019-12-30 DIAGNOSIS — F17.200 SMOKER: ICD-10-CM

## 2019-12-30 DIAGNOSIS — B20 HUMAN IMMUNODEFICIENCY VIRUS (HIV) DISEASE: Primary | ICD-10-CM

## 2019-12-30 DIAGNOSIS — C61 PROSTATE CANCER: ICD-10-CM

## 2019-12-30 DIAGNOSIS — I77.819 AORTIC ECTASIA: ICD-10-CM

## 2019-12-30 DIAGNOSIS — E88.1 LIPODYSTROPHY DUE TO HIV INFECTION AND ANTIRETROVIRAL THERAPY: ICD-10-CM

## 2019-12-30 DIAGNOSIS — B20 LIPODYSTROPHY DUE TO HIV INFECTION AND ANTIRETROVIRAL THERAPY: ICD-10-CM

## 2019-12-30 DIAGNOSIS — Z79.899 LIPODYSTROPHY DUE TO HIV INFECTION AND ANTIRETROVIRAL THERAPY: ICD-10-CM

## 2019-12-30 DIAGNOSIS — E78.00 HIGH CHOLESTEROL: ICD-10-CM

## 2019-12-30 PROCEDURE — 99214 OFFICE O/P EST MOD 30 MIN: CPT | Mod: 25,S$GLB,, | Performed by: INTERNAL MEDICINE

## 2019-12-30 PROCEDURE — 99214 PR OFFICE/OUTPT VISIT, EST, LEVL IV, 30-39 MIN: ICD-10-PCS | Mod: 25,S$GLB,, | Performed by: INTERNAL MEDICINE

## 2019-12-30 PROCEDURE — 90686 FLU VACCINE (QUAD) GREATER THAN OR EQUAL TO 3YO PRESERVATIVE FREE IM: ICD-10-PCS | Mod: S$GLB,,, | Performed by: INTERNAL MEDICINE

## 2019-12-30 PROCEDURE — 3008F PR BODY MASS INDEX (BMI) DOCUMENTED: ICD-10-PCS | Mod: S$GLB,,, | Performed by: INTERNAL MEDICINE

## 2019-12-30 PROCEDURE — 90686 IIV4 VACC NO PRSV 0.5 ML IM: CPT | Mod: S$GLB,,, | Performed by: INTERNAL MEDICINE

## 2019-12-30 PROCEDURE — 90471 IMMUNIZATION ADMIN: CPT | Mod: S$GLB,,, | Performed by: INTERNAL MEDICINE

## 2019-12-30 PROCEDURE — 90471 FLU VACCINE (QUAD) GREATER THAN OR EQUAL TO 3YO PRESERVATIVE FREE IM: ICD-10-PCS | Mod: S$GLB,,, | Performed by: INTERNAL MEDICINE

## 2019-12-30 PROCEDURE — 3008F BODY MASS INDEX DOCD: CPT | Mod: S$GLB,,, | Performed by: INTERNAL MEDICINE

## 2019-12-30 RX ORDER — ESCITALOPRAM OXALATE 10 MG/1
10 TABLET ORAL DAILY
Qty: 90 TABLET | Refills: 3 | Status: SHIPPED | OUTPATIENT
Start: 2019-12-30 | End: 2021-02-15 | Stop reason: SDUPTHER

## 2019-12-30 NOTE — PATIENT INSTRUCTIONS
Viral load today  Flu vaccine today  Referral to Dr. Mulugeta Powers, cardiologist    Sending in prescription for lexapro(escitalopram) 10 mg   Please start by taking 1/2 tablet each morning with breakfast    Please measure your blood pressure daily for a while, and record the numbers, and send them to me.

## 2019-12-30 NOTE — PROGRESS NOTES
Subjective:       Patient ID: Jones Germain is a 59 y.o. male.    Chief Complaint:: HIV Positive/AIDS    HPI  Discussed prostate cancer diagnosis and options, admits depression. Concerned about requiring catheter, being incontinent, radiation adverse effects. He is planning to do the surgery before summer break so that he will have time to recover.  Did receive his flu vaccine. Converted to generic atripla  Compliant with meds. Still smoking. Has not yet established with primary care. Discussed that he will need clearance for surgery and anesthesia    6/27/19: he was seen by Dr. De La Torre for cardiac clearance. On 5/29 he had robotic prostatectomy, received bactrim post op and had jones for 2 weeks. Requiring no pain meds but has sensitivity over his midline abdominal incision. Tells me he will not require any additional treatment. He feels like he is emptying his bladder well. Stream is good. Rare leak. He stopped his Atripla for a week after the surgery. Very frustrated with the cost of his care and very limited in his finances. He isolates himself    12/30/19: reviewed recent hospitalization. He went in for chest pain, had a negative experience, had a stress echo which was negative and CT chest and calcium score. 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 He left AMA for personal reasons. He  . Has reduced cig to 1/4 ppd. Very stressed from work related issues, cost of medical care. Had a diarrheal illness for 2 weeks after that admission and [facility]. He has lots of anxiety, frustration, anger, depression underlying and prior to these events. He did not feel that the wellbutrin did much good. He is also using a nicotine patch which may be too strong for the 1/4 pack he is smoking now. His BP is high as well as pulse. He states that he measures his blood pressure at home from time to time and its ok. Discussed that HTN can make the aorta more dilated and risk an aneurysm forming. He did not have  viralload in July as requested.       Current Outpatient Medications:     aspirin 81 MG Chew, Take 81 mg by mouth once daily. , Disp: , Rfl:     efavirenz (SUSTIVA) 600 mg Tab, Take 1 tablet (600 mg total) by mouth every evening., Disp: 90 tablet, Rfl: 3    emtricitabine-tenofovir alafen (DESCOVY) 200-25 mg Tab, Take 1 tablet by mouth once daily. (Patient taking differently: Take 1 tablet by mouth every evening. ), Disp: 90 tablet, Rfl: 3    buPROPion (WELLBUTRIN XL) 150 MG TB24 tablet, Take 1 tablet (150 mg total) by mouth every morning., Disp: 30 tablet, Rfl: 6    escitalopram oxalate (LEXAPRO) 10 MG tablet, Take 1 tablet (10 mg total) by mouth once daily., Disp: 90 tablet, Rfl: 3    nitroGLYCERIN (NITROSTAT) 0.4 MG SL tablet, If you have chest pain, place 1 tablet under tongue every 5 min as needed for 3 doses.  Then call your doctor afterwards (Patient not taking: Reported on 12/30/2019), Disp: 15 tablet, Rfl: 0    pravastatin (PRAVACHOL) 40 MG tablet, Take 1 tablet (40 mg total) by mouth once daily. (Patient taking differently: Take 40 mg by mouth every evening. ), Disp: 30 tablet, Rfl: 6  Review of patient's allergies indicates:  No Known Allergies  Past Medical History:   Diagnosis Date    Adverse effect of hepatitis B vaccine     non responder    Hepatitis A 1991    High frequency hearing loss     HIV (human immunodeficiency virus infection) 1995    On COM/cRix until kidney stones, sallie 200    Hypercholesterolemia     Hyperlipidemia     Kidney stone 2005    [facility], Lithotripsy    Lipoatrophy     Mild    Pertussis     As a child    Prostate cancer     Secondary syphilis 2002    Smoker     Vertigo 02/2018    Due to Ear Infection     Past Surgical History:   Procedure Laterality Date    LITHOTRIPSY      ROBOT-ASSISTED LAPAROSCOPIC PROSTATECTOMY N/A 5/29/2019    Procedure: ROBOTIC PROSTATECTOMY;  Surgeon: Baljinder Johnson MD;  Location: [facility];  Service: Urology;  Laterality: N/A;     TRANSRECTAL BIOPSY OF PROSTATE WITH ULTRASOUND GUIDANCE N/A 2018    Procedure: BIOPSY, PROSTATE, RECTAL APPROACH, WITH US GUIDANCE;  Surgeon: Baljinder Johnson MD;  Location: [facility];  Service: Urology;  Laterality: N/A;     Social History     Socioeconomic History    Marital status: Single     Spouse name: Not on file    Number of children: Not on file    Years of education: Not on file    Highest education level: Not on file   Occupational History    Occupation: Teacher   Social Needs    Financial resource strain: Not on file    Food insecurity:     Worry: Not on file     Inability: Not on file    Transportation needs:     Medical: Not on file     Non-medical: Not on file   Tobacco Use    Smoking status: Current Every Day Smoker     Packs/day: 1.00     Types: Cigarettes    Smokeless tobacco: Never Used    Tobacco comment: 20 to 30   Substance and Sexual Activity    Alcohol use: No     Frequency: Never    Drug use: No    Sexual activity: Never     Comment: Abstinent   Lifestyle    Physical activity:     Days per week: Not on file     Minutes per session: Not on file    Stress: Not on file   Relationships    Social connections:     Talks on phone: Not on file     Gets together: Not on file     Attends Oriental orthodox service: Not on file     Active member of club or organization: Not on file     Attends meetings of clubs or organizations: Not on file     Relationship status: Not on file   Other Topics Concern    Not on file   Social History Narrative    Not on file     Family History   Problem Relation Age of Onset    Diabetes Mother          in a MVC    COPD Father     Lung cancer Paternal Grandmother        Travel History:   Vaccine History: Yearly flu vaccine, Pneumovax 2004, , Prevnar , tetanus 2005, Td AP , Zostavax 2016, hepatitis B ×3 2016  Advanced Directive:   Safer Sex: Abstinent  Bone Density: 2011 osteopenia  Colonoscopy: 2015    Review of Systems   "  Constitutional: No fever, chills, sweats, fatigue, weakness,      Eyes: No change in vision, loss of vision,  .     ENT: No sinus drainage, sore throat, mouth pain, or lesions.     Cardiovascular: No chest pain, GAVIN, palpitations or pedal edema    Respiratory: No shortness of breath, GAVIN, cough, wheeze, sputum, pleurisy, or hemoptysis. Did reduce his smoking to 1/4 pack per day    Gastrointestinal: No abdominal pain, nausea, vomiting, diarrhea, c  or focal abd pain    Genitourinary: No dysuria, hematuria, incontinence, frequency, flank pain,      Musculoskeletal: No new pain, joint swelling, or injuries    Integumentary: No new rashes, lesions,   Neurological: No dizziness, vertigo, unusual headaches, neuropathy, or falls    Psychiatric: now he admits to anxiety, and depression. He is "doctored out" this year. He has minimal pleasure in life and fears the next bad thing. He is isolated. 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 2 students committed suicide over the wil holidays as well. We discussed SSRIs    Endocrine: No diabetes Thyroid normal    Lymphatic: No lymphadenopathy, blood loss, anemia,     Objective:      Blood pressure (!) 152/96, pulse (!) 114, temperature 98.3 °F (36.8 °C), temperature source Temporal, height 5' 6" (1.676 m), weight 68 kg (150 lb), SpO2 100 %. Body mass index is 24.21 kg/m².  Physical Exam   Repeat was 144/84    General: Alert and attentive, cooperative and in no distress, agitated, angry .    Eyes: Pupils equal, round, reactive to light, anicteric, EOMI    Neck: Supple, non-tender, no thyromegaly or masses    ENT: EAC patent, TM normal, nares patent, no oral lesions, teeth in good condition, no thrush    Cardiovascular: Regular rate and rhythm, no murmurs, rubs, or gallop    Respiratory: Lungs clear without wheezes, no rales, rub or rhonci    Gastrointestinal: Active bowel sounds, soft, no mass or organomegaly, " no tenderness or distention    Genitourinary: No flank tenderness    Vascular: No peripheral edema, phlebitis, pulses normal. Warm and well perfused    Musculoskeletal: Ambulates without difficulty , no acute arthritis, synovitis or myositis. Normal muscle bulk and strength    Integumentary: Skin without rashes, lesions,     AnusRectum:     Neurological: Normal LOC, cranial nerves, speech, reflexes, normal gait    Psychiatric: see above    Lymphatic: No cervical, supraclavicular, axillary, or inguinal lymphadenopathy      Wound:      HIV Table:   HLA-B 5701     TOXOIgG     RPR 12/2018 non reactive   HEP A IgG 6/8/2015 infection/immune   HBsAg 6/8/2015 negative   HBsAb 12/3/2016 negative, non responder   HBcAb     HBeAb     HBeAg     HCVAb 7/8/2018  negative   HBV DNA     HCV RNA     Vitamin D 1/20/2018 40   Chlamydia / GC 12/5/2014 negative   TB Gold  12/2018  negative  PSA   9/2019  undetectable  HIV RNA  12/2018  <20    Recent Diagnostics: Reviewed July lab       Assessment and Plan:           Human immunodeficiency virus (HIV) disease  -     HIV RNA, quantitative, PCR; Future; Expected date: 12/30/2019    Aortic ectasia  -     Ambulatory consult to Cardiology    High cholesterol    Prostate cancer    Smoker    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  Dispense: 90 tablet; Refill: 3  -     Influenza - Quadrivalent (PF)      Viral load today  Flu vaccine today  Referral to Dr. Mulugeta Powers, cardiologist    Sending in prescription for lexapro(escitalopram) 10 mg   Please start by taking 1/2 tablet each morning with breakfast    Please measure your blood pressure daily for a while, and record the numbers, and send them to me.     This note was created using Dragon voice recognition software that occasionally misinterpreted phrases or words.

## 2020-01-02 LAB
HIV1 RNA # SERPL NAA+PROBE: <20 COPIES/ML
HIV1 RNA SERPL NAA+PROBE-LOG#: NORMAL LOG10COPY/ML

## 2020-01-10 ENCOUNTER — LAB VISIT (OUTPATIENT)
Dept: LAB | Facility: HOSPITAL | Age: 60
End: 2020-01-10
Attending: UROLOGY
Payer: COMMERCIAL

## 2020-01-10 DIAGNOSIS — Z85.46 HISTORY OF PROSTATE CANCER: ICD-10-CM

## 2020-01-10 LAB — COMPLEXED PSA SERPL-MCNC: <0.01 NG/ML (ref 0–4)

## 2020-01-10 PROCEDURE — 36415 COLL VENOUS BLD VENIPUNCTURE: CPT

## 2020-01-10 PROCEDURE — 84153 ASSAY OF PSA TOTAL: CPT

## 2020-01-13 ENCOUNTER — OFFICE VISIT (OUTPATIENT)
Dept: UROLOGY | Facility: CLINIC | Age: 60
End: 2020-01-13
Payer: COMMERCIAL

## 2020-01-13 VITALS
SYSTOLIC BLOOD PRESSURE: 131 MMHG | WEIGHT: 152.13 LBS | BODY MASS INDEX: 24.45 KG/M2 | DIASTOLIC BLOOD PRESSURE: 79 MMHG | HEIGHT: 66 IN | HEART RATE: 84 BPM

## 2020-01-13 DIAGNOSIS — Z85.46 HISTORY OF PROSTATE CANCER: Primary | ICD-10-CM

## 2020-01-13 PROCEDURE — 81002 URINALYSIS NONAUTO W/O SCOPE: CPT | Mod: S$GLB,,, | Performed by: UROLOGY

## 2020-01-13 PROCEDURE — 99213 PR OFFICE/OUTPT VISIT, EST, LEVL III, 20-29 MIN: ICD-10-PCS | Mod: 25,S$GLB,, | Performed by: UROLOGY

## 2020-01-13 PROCEDURE — 81002 POCT URINE DIPSTICK WITHOUT MICROSCOPE: ICD-10-PCS | Mod: S$GLB,,, | Performed by: UROLOGY

## 2020-01-13 PROCEDURE — 99999 PR PBB SHADOW E&M-EST. PATIENT-LVL III: ICD-10-PCS | Mod: PBBFAC,,, | Performed by: UROLOGY

## 2020-01-13 PROCEDURE — 3008F PR BODY MASS INDEX (BMI) DOCUMENTED: ICD-10-PCS | Mod: CPTII,S$GLB,, | Performed by: UROLOGY

## 2020-01-13 PROCEDURE — 99999 PR PBB SHADOW E&M-EST. PATIENT-LVL III: CPT | Mod: PBBFAC,,, | Performed by: UROLOGY

## 2020-01-13 PROCEDURE — 99213 OFFICE O/P EST LOW 20 MIN: CPT | Mod: 25,S$GLB,, | Performed by: UROLOGY

## 2020-01-13 PROCEDURE — 3008F BODY MASS INDEX DOCD: CPT | Mod: CPTII,S$GLB,, | Performed by: UROLOGY

## 2020-01-13 NOTE — PROGRESS NOTES
[facility] Urology Progress Note     Jones Germain is a 59 y.o. male who presents for prostate cancer follow up     Mr Germain is a 58 yo M referred by Dr. Wilder after finding of prostate nodule on ERIBERTO, which I confirmed on exam to left of central sulcus, and he had a PSA of 2.0 He underwent TRUS/biopsy on 9/25/18 revealing a 57.3g gland with 5/15 cores aiden 6 prostate cancer, largely left sided, with small volume on right. He initially elected active surveillance and returned 6 months later with interval psa rise to 2.7 and progression of his bothersome LUTS with AUA SS 17/5. After careful consideration and extensive discussion of management options, he elected to proceed with robotic assisted radical prostatectomy     5/29/19: 1. Robot-assisted laparoscopic radical prostatectomy, right nerve sparing 2. Bilateral pelvic lymphadenectomy  Findings: moderate sigmoid adhesions, significant median lobe requiring extended bladder neck dissection and necessitating bladder neck reconstruction, no leak on testing anastamosis   CARLOTTA drain fluid creatinine equivalent with serum POD 1 and CARLOTTA output was minimal and did not increase upon ambulation, and despite bladder neck reconstruction, anastomosis had no intraoperative evidence of urine leak when tested, so CARLOTTA removed   Initial postop cystogram on 6/6/19 had a very small area of anastomotic leak which resolved by repeat cystogram on 6/11/19 at which time Lynne catheter was removed.  PATHOLOGY: [id number] aiden 3+3 negative margin     7/16/19: Essentially continent, Leakage is usually just right after urinating if doesn't completely empty. Rare jeana cough sneeze or moving furniture. Dry at night  Sleeping through the night - had ntf 3-4x preop. If bladder full has very normal voiding with good stream. Occasionally has to double void to empty.  Has cut back to half pack from 1 on cigarettes - using filters as well. Delayed kegel efforts because sore but is now     9/30/19: PSA  "9/16/19 is <0.01. Pad to work for safety. Every day is dry. 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  Only real problem is if he has to run to the bathroom and has limited amount of time to do it on a break, may have some postvoid dribble which isnt a leak.  No longer having true stress urinary incontinence.  No leak with cough, sneeze, passing flatus.  Still doing Kegel exercises. Pelvic, perineal, testicular soreness has resolved as of 2 weeks ago, feeling well. Still smoking slightly over half a pack per day. No erections. Has sensation, can climax     He returns today noting:  PSA 1/10/20 is < 0.01, undetectable  He did have interim admission to [facility] in November 2019 for chest pain and found to have aortic ectasia and mural thrombus and has been referred to cardiology to establish care  Most recent HIV viral load was undetectable.  Last seen by Dr. Wilder on 12/30/19 and noted to be down to 1/4 pack per day cigarettes, and started on Lexapro for his anxiety and depression  Today he notes he has employed nicotine patch and is only having cigarettes here and there.  No cigarettes in the last 48 hr.  He is essentially continent.  He wears a pad to school just in case of leakage or emergency but has been okay, and his pads have been dry during the day.  He has been able to better employ timed voiding between classes and otherwise stress urinary incontinence has resolved and he has no urinary urgency or urge incontinence.  No spontaneous erectile function at this time        ROS: A comprehensive 10 system review was performed and is negative except as noted above in HPI    PHYSICAL EXAM:    Vitals:    01/13/20 1509   BP: 131/79   Pulse: 84     Body mass index is 24.55 kg/m². Weight: 69 kg (152 lb 1.9 oz) Height: 5' 6" (167.6 cm)       General: Alert, cooperative, no distress, appears stated age   Head: Normocephalic, without obvious " abnormality, atraumatic   Eyes: PERRL, conjunctiva/corneas clear   Lungs: Respirations unlabored   Heart: Warm and well perfused   Abdomen: soft NT ND lap incisions well healed no hernia  Extremities: Extremities normal, atraumatic, no cyanosis or edema   Skin: Skin color, texture, turgor normal, no rashes or lesions   Psych: Appropriate   Neurologic: Non-focal       Recent Results (from the past 336 hour(s))   Prostate Specific Antigen, Diagnostic    Collection Time: 01/10/20  3:14 PM   Result Value Ref Range    PSA DIAGNOSTIC <0.01 0.00 - 4.00 ng/mL   POCT URINE DIPSTICK WITHOUT MICROSCOPE    Collection Time: 01/13/20  3:21 PM   Result Value Ref Range    Color, UA yellow     Spec Grav UA 1.030     pH, UA 5     WBC, UA neg     Nitrite, UA neg     Protein neg     Glucose, UA neg     Ketones, UA neg     Urobilinogen, UA 0.2     Bilirubin neg     Blood, UA small        ASSESSMENT   1. History of prostate cancer  POCT URINE DIPSTICK WITHOUT MICROSCOPE    Prostate Specific Antigen, Diagnostic    Prostate Specific Antigen, Diagnostic       Plan    He is doing very well 6 months status post robotic prostatectomy with favorable pathologic outcome and undetectable PSA.  He is continent.  He did have unilateral nerve-sparing procedure and we did discuss postoperative pathways for post prostatectomy erectile dysfunction, both rehabilitative as well as on demand, including the use of schedule low-dose PDE 5 inhibitors with p.r.n. on demand use, the use of vacuum erection device in a rehabilitative were on demand fashion, as well as intracavernosal injection and ultimately penile prosthesis.  At this time, the patient is not interested in any interventions for erectile function noting it is not a priority at this time and he would like to see what happens at 1 year postoperatively spontaneously and then will discuss any further management.  As per his wishes, I noted I would follow his lead on this, and he should continue to  get PSA screening every 3 months in the initial year postoperatively, and therefore will be scheduled for a lab visit in 3 months, and then in 6 months prior to six-month follow-up, to review how he is doing at 1 year postoperative.  All questions patient had were answered and he is agreeable to treatment plan.  Of note urinalysis has small blood, and will continue to monitor going forward.  If this persists further followups, further out from surgery, will run microscopic analysis and workup if true micro hematuria is present

## 2020-03-09 DIAGNOSIS — B20 HIV INFECTION, UNSPECIFIED SYMPTOM STATUS: Primary | Chronic | ICD-10-CM

## 2020-03-17 DIAGNOSIS — B20 HIV INFECTION, UNSPECIFIED SYMPTOM STATUS: Chronic | ICD-10-CM

## 2020-03-17 DIAGNOSIS — E78.00 HIGH CHOLESTEROL: ICD-10-CM

## 2020-03-17 DIAGNOSIS — B20 HUMAN IMMUNODEFICIENCY VIRUS (HIV) DISEASE: ICD-10-CM

## 2020-03-17 RX ORDER — PRAVASTATIN SODIUM 40 MG/1
40 TABLET ORAL DAILY
Qty: 90 TABLET | Refills: 2 | Status: SHIPPED | OUTPATIENT
Start: 2020-03-17 | End: 2021-02-15 | Stop reason: SDUPTHER

## 2020-06-15 ENCOUNTER — OFFICE VISIT (OUTPATIENT)
Dept: INFECTIOUS DISEASES | Facility: CLINIC | Age: 60
End: 2020-06-15
Payer: COMMERCIAL

## 2020-06-15 VITALS
OXYGEN SATURATION: 97 % | TEMPERATURE: 99 F | SYSTOLIC BLOOD PRESSURE: 120 MMHG | HEART RATE: 97 BPM | DIASTOLIC BLOOD PRESSURE: 80 MMHG | BODY MASS INDEX: 24.21 KG/M2 | WEIGHT: 150 LBS

## 2020-06-15 DIAGNOSIS — Z79.899 LIPODYSTROPHY DUE TO HIV INFECTION AND ANTIRETROVIRAL THERAPY: ICD-10-CM

## 2020-06-15 DIAGNOSIS — I77.819 AORTIC ECTASIA: ICD-10-CM

## 2020-06-15 DIAGNOSIS — B20 HUMAN IMMUNODEFICIENCY VIRUS (HIV) DISEASE: Primary | ICD-10-CM

## 2020-06-15 DIAGNOSIS — E78.00 HIGH CHOLESTEROL: ICD-10-CM

## 2020-06-15 DIAGNOSIS — Z71.89 EDUCATED ABOUT COVID-19 VIRUS INFECTION: ICD-10-CM

## 2020-06-15 DIAGNOSIS — F17.200 SMOKER: ICD-10-CM

## 2020-06-15 DIAGNOSIS — B20 LIPODYSTROPHY DUE TO HIV INFECTION AND ANTIRETROVIRAL THERAPY: ICD-10-CM

## 2020-06-15 DIAGNOSIS — E88.1 LIPODYSTROPHY DUE TO HIV INFECTION AND ANTIRETROVIRAL THERAPY: ICD-10-CM

## 2020-06-15 DIAGNOSIS — R73.9 HYPERGLYCEMIA: ICD-10-CM

## 2020-06-15 PROCEDURE — 99214 OFFICE O/P EST MOD 30 MIN: CPT | Mod: S$GLB,,, | Performed by: INTERNAL MEDICINE

## 2020-06-15 PROCEDURE — 99214 PR OFFICE/OUTPT VISIT, EST, LEVL IV, 30-39 MIN: ICD-10-PCS | Mod: S$GLB,,, | Performed by: INTERNAL MEDICINE

## 2020-06-15 RX ORDER — EFAVIRENZ 600 MG/1
600 TABLET, FILM COATED ORAL NIGHTLY
Qty: 90 TABLET | Refills: 1 | Status: SHIPPED | OUTPATIENT
Start: 2020-06-15 | End: 2021-06-28 | Stop reason: SDUPTHER

## 2020-06-15 NOTE — PATIENT INSTRUCTIONS
Dr. Mulugeta Powers, cardiology. Please make an appointment    Lab at [facility], please fast before these tests    Flu vaccine in late October/november    Same medications, sustiva refilled    Follow up in 6 months    Last pneumovax at age 64

## 2020-06-15 NOTE — PROGRESS NOTES
Subjective:       Patient ID: Jones Germain is a 60 y.o. male.    Chief Complaint:: Follow-up (hiv)    HIV Positive/AIDS  Follow-up      Discussed prostate cancer diagnosis and options, admits depression. Concerned about requiring catheter, being incontinent, radiation adverse effects. He is planning to do the surgery before summer break so that he will have time to recover.  Did receive his flu vaccine. Converted to generic atripla  Compliant with meds. Still smoking. Has not yet established with primary care. Discussed that he will need clearance for surgery and anesthesia    6/27/19: he was seen by Dr. De La Torre for cardiac clearance. On 5/29 he had robotic prostatectomy, received bactrim post op and had jones for 2 weeks. Requiring no pain meds but has sensitivity over his midline abdominal incision. Tells me he will not require any additional treatment. He feels like he is emptying his bladder well. Stream is good. Rare leak. He stopped his Atripla for a week after the surgery. Very frustrated with the cost of his care and very limited in his finances. He isolates himself    12/30/19: reviewed recent hospitalization. He went in for chest pain, had a negative experience, had a stress echo which was negative and CT chest and calcium score. 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 He left AMA for personal reasons. He Has reduced cig to 1/4 ppd. Very stressed from work related issues, cost of medical care. Had a diarrheal illness for 2 weeks after that admission and [facility]. He has lots of anxiety, frustration, anger, depression underlying and prior to these events. He did not feel that the wellbutrin did much good. He is also using a nicotine patch which may be too strong for the 1/4 pack he is smoking now. His BP is high as well as pulse. He states that he measures his blood pressure at home from time to time and its ok. Discussed that HTN can make the aorta more dilated and risk an aneurysm  forming. He did not have viralload in July as requested.     6/15/20: he is protecting himself from COVID. He is socializing very little.  lexapro has helped him a great deal. He started doing yoga and he enjoys this.  He has not seen cardiologist yet.       Current Outpatient Medications:     aspirin 81 MG Chew, Take 81 mg by mouth once daily. , Disp: , Rfl:     emtricitabine-tenofovir alafen (DESCOVY) 200-25 mg Tab, Take 1 tablet by mouth once daily., Disp: 90 tablet, Rfl: 2    escitalopram oxalate (LEXAPRO) 10 MG tablet, Take 1 tablet (10 mg total) by mouth once daily., Disp: 90 tablet, Rfl: 3    nitroGLYCERIN (NITROSTAT) 0.4 MG SL tablet, If you have chest pain, place 1 tablet under tongue every 5 min as needed for 3 doses.  Then call your doctor afterwards, Disp: 15 tablet, Rfl: 0    pravastatin (PRAVACHOL) 40 MG tablet, Take 1 tablet (40 mg total) by mouth once daily., Disp: 90 tablet, Rfl: 2    buPROPion (WELLBUTRIN XL) 150 MG TB24 tablet, Take 1 tablet (150 mg total) by mouth every morning., Disp: 30 tablet, Rfl: 6    efavirenz (SUSTIVA) 600 mg Tab, Take 1 tablet (600 mg total) by mouth every evening., Disp: 90 tablet, Rfl: 1  Review of patient's allergies indicates:  No Known Allergies  Past Medical History:   Diagnosis Date    Adverse effect of hepatitis B vaccine     non responder    Hepatitis A 1991    High frequency hearing loss     HIV (human immunodeficiency virus infection) 1995    On COM/cRix until kidney stones, sallie 200    Hypercholesterolemia     Hyperlipidemia     Kidney stone 2005    [facility], Lithotripsy    Lipoatrophy     Mild    Pertussis     As a child    Prostate cancer     Secondary syphilis 2002    Smoker     Vertigo 02/2018    Due to Ear Infection     Past Surgical History:   Procedure Laterality Date    LITHOTRIPSY      ROBOT-ASSISTED LAPAROSCOPIC PROSTATECTOMY N/A 5/29/2019    Procedure: ROBOTIC PROSTATECTOMY;  Surgeon: Baljinder Johnson MD;  Location: [facility] OR;   Service: Urology;  Laterality: N/A;    TRANSRECTAL BIOPSY OF PROSTATE WITH ULTRASOUND GUIDANCE N/A 2018    Procedure: BIOPSY, PROSTATE, RECTAL APPROACH, WITH US GUIDANCE;  Surgeon: Baljinder Johnson MD;  Location: [facility];  Service: Urology;  Laterality: N/A;     Social History     Socioeconomic History    Marital status: Single     Spouse name: Not on file    Number of children: Not on file    Years of education: Not on file    Highest education level: Not on file   Occupational History    Occupation: Teacher   Social Needs    Financial resource strain: Not on file    Food insecurity     Worry: Not on file     Inability: Not on file    Transportation needs     Medical: Not on file     Non-medical: Not on file   Tobacco Use    Smoking status: Current Every Day Smoker     Packs/day: 1.00     Types: Cigarettes    Smokeless tobacco: Never Used    Tobacco comment: 20 to 30   Substance and Sexual Activity    Alcohol use: No     Frequency: Never    Drug use: No    Sexual activity: Never     Comment: Abstinent   Lifestyle    Physical activity     Days per week: Not on file     Minutes per session: Not on file    Stress: Not on file   Relationships    Social connections     Talks on phone: Not on file     Gets together: Not on file     Attends Sabianism service: Not on file     Active member of club or organization: Not on file     Attends meetings of clubs or organizations: Not on file     Relationship status: Not on file   Other Topics Concern    Not on file   Social History Narrative    Not on file     Family History   Problem Relation Age of Onset    Diabetes Mother          in a MVC    COPD Father     Lung cancer Paternal Grandmother        Travel History:   Vaccine History: Yearly flu vaccine, Pneumovax , , Prevnar , tetanus 2005, Td AP , Zostavax 2016, hepatitis B ×3 2016  Advanced Directive:   Safer Sex: Abstinent  Bone Density: 2011 osteopenia  Colonoscopy:  2015    Review of Systems    Constitutional: No fever, chills, sweats, fatigue, weakness,     Yoga for exercise 4 times a week    Eyes: No change in vision, loss of vision,  .     ENT: No sinus drainage, sore throat, mouth pain, or lesions.     Cardiovascular: No chest pain, GAVIN, palpitations or pedal edema    Respiratory: No shortness of breath, GAVIN, cough, wheeze, sputum, pleurisy, or hemoptysis.  smoking  1/4 pack per day    Gastrointestinal: No abdominal pain, nausea, vomiting, diarrhea,  or focal abd pain    Genitourinary: No dysuria, hematuria, incontinence, frequency, flank pain,  And feels back to normal urologically    Musculoskeletal: No new pain, joint swelling, or injuries    Integumentary: No new rashes, lesions,   Neurological: No dizziness, vertigo, unusual headaches, neuropathy, or falls    Psychiatric: he is pleased with lexapro    Endocrine: No diabetes Thyroid normal    Lymphatic: No lymphadenopathy, blood loss, anemia,     Objective:      Blood pressure 120/80, pulse 97, temperature 98.6 °F (37 °C), weight 68 kg (150 lb), SpO2 97 %. Body mass index is 24.21 kg/m².  Physical Exam   Repeat was 144/84    General: Alert and attentive, cooperative and in the best mood I've seen him in years.    Eyes: Pupils equal, round, reactive to light, anicteric, EOMI    Neck: Supple, non-tender, no thyromegaly or masses    ENT: EAC patent, TM normal, nares patent, no oral lesions, teeth in good condition, no thrush    Cardiovascular: Regular rate and rhythm, no murmurs, rubs, or gallop    Respiratory: Lungs clear without wheezes, no rales, rub or rhonci    Gastrointestinal: Active bowel sounds, soft, no mass or organomegaly, no tenderness or distention    Genitourinary: No flank tenderness    Vascular: No peripheral edema, phlebitis, pulses normal. Warm and well perfused    Musculoskeletal: Ambulates without difficulty , no acute arthritis, synovitis or myositis. Normal muscle bulk and strength    Integumentary:  Skin without rashes, lesions,     AnusRectum: per urology    Neurological: Normal LOC, cranial nerves, speech, reflexes, normal gait    Psychiatric: calm and happy    Lymphatic: No cervical, supraclavicular, axillary, or inguinal lymphadenopathy      Wound:      HIV Table:   HLA-B 5701     TOXOIgG     RPR 12/2018 non reactive   HEP A IgG 6/8/2015 infection/immune   HBsAg 6/8/2015 negative   HBsAb 12/3/2016 negative, non responder   HBcAb     HBeAb     HBeAg     HCVAb 7/8/2018  negative   HBV DNA     HCV RNA     Vitamin D 1/20/2018 40   Chlamydia / GC 12/5/2014 negative   TB Gold  12/2018  negative  PSA   1/2020  undetectable  HIV RNA  12/2019  <20    Recent Diagnostics: Reviewed July lab       Assessment and Plan:           Human immunodeficiency virus (HIV) disease  -     CBC auto differential; Future; Expected date: 06/15/2020  -     Comprehensive metabolic panel; Future; Expected date: 06/15/2020  -     Lipid Panel; Future; Expected date: 06/15/2020  -     HIV RNA, quantitative, PCR; Future; Expected date: 06/15/2020  -     QuantiFERON-TB Gold Plus; Future; Expected date: 06/15/2020  -     efavirenz (SUSTIVA) 600 mg Tab; Take 1 tablet (600 mg total) by mouth every evening.  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  Dr. Mulugeta Powers, cardiology. Please make an appointment    Lab at [facility], please fast before these tests    Flu vaccine in late October/november    Same medications, sustiva refilled    Follow up in 6 months    Last pneumovax at age 64    This note was created using Dragon voice recognition software that occasionally misinterpreted phrases or  words.

## 2020-06-24 LAB
ALBUMIN SERPL-MCNC: 4.8 G/DL (ref 3.8–4.8)
ALBUMIN/GLOB SERPL: 2.1 {RATIO} (ref 1.2–2.2)
ALP SERPL-CCNC: 92 IU/L (ref 39–117)
ALT SERPL-CCNC: 20 IU/L (ref 0–44)
AST SERPL-CCNC: 18 IU/L (ref 0–40)
BASOPHILS # BLD AUTO: 0 X10E3/UL (ref 0–0.2)
BASOPHILS NFR BLD AUTO: 0 %
BILIRUB SERPL-MCNC: 0.4 MG/DL (ref 0–1.2)
BUN SERPL-MCNC: 8 MG/DL (ref 8–27)
BUN/CREAT SERPL: 9 (ref 10–24)
CALCIUM SERPL-MCNC: 9.2 MG/DL (ref 8.6–10.2)
CHLORIDE SERPL-SCNC: 100 MMOL/L (ref 96–106)
CHOLEST SERPL-MCNC: 176 MG/DL (ref 100–199)
CO2 SERPL-SCNC: 22 MMOL/L (ref 20–29)
CREAT SERPL-MCNC: 0.88 MG/DL (ref 0.76–1.27)
EOSINOPHIL # BLD AUTO: 0.1 X10E3/UL (ref 0–0.4)
EOSINOPHIL NFR BLD AUTO: 2 %
ERYTHROCYTE [DISTWIDTH] IN BLOOD BY AUTOMATED COUNT: 12.6 % (ref 11.6–15.4)
GAMMA INTERFERON BACKGROUND BLD IA-ACNC: 0.03 IU/ML
GLOBULIN SER CALC-MCNC: 2.3 G/DL (ref 1.5–4.5)
GLUCOSE SERPL-MCNC: 92 MG/DL (ref 65–99)
HBA1C MFR BLD: 5.1 % (ref 4.8–5.6)
HCT VFR BLD AUTO: 50.2 % (ref 37.5–51)
HDLC SERPL-MCNC: 29 MG/DL
HGB BLD-MCNC: 16.9 G/DL (ref 13–17.7)
HIV1 RNA # SERPL NAA+PROBE: <20 COPIES/ML
HIV1 RNA SERPL NAA+PROBE-LOG#: NORMAL LOG10COPY/ML
IMM GRANULOCYTES # BLD AUTO: 0 X10E3/UL (ref 0–0.1)
IMM GRANULOCYTES NFR BLD AUTO: 1 %
LDLC SERPL CALC-MCNC: 96 MG/DL (ref 0–99)
LYMPHOCYTES # BLD AUTO: 1.5 X10E3/UL (ref 0.7–3.1)
LYMPHOCYTES NFR BLD AUTO: 29 %
M TB IFN-G BLD-IMP: NEGATIVE
M TB IFN-G CD4+ BCKGRND COR BLD-ACNC: 0.04 IU/ML
MCH RBC QN AUTO: 32.2 PG (ref 26.6–33)
MCHC RBC AUTO-ENTMCNC: 33.7 G/DL (ref 31.5–35.7)
MCV RBC AUTO: 96 FL (ref 79–97)
MITOGEN IGNF BLD-ACNC: >10 IU/ML
MONOCYTES # BLD AUTO: 0.4 X10E3/UL (ref 0.1–0.9)
MONOCYTES NFR BLD AUTO: 8 %
NEUTROPHILS # BLD AUTO: 3 X10E3/UL (ref 1.4–7)
NEUTROPHILS NFR BLD AUTO: 60 %
PLATELET # BLD AUTO: 255 X10E3/UL (ref 150–450)
POTASSIUM SERPL-SCNC: 4.5 MMOL/L (ref 3.5–5.2)
PROT SERPL-MCNC: 7.1 G/DL (ref 6–8.5)
QUANTIFERON TB GOLD (INCUBATED): NORMAL
QUANTIFERON TB2 AG VALUE: 0.05 IU/ML
RBC # BLD AUTO: 5.25 X10E6/UL (ref 4.14–5.8)
SARS-COV-2 IGG SERPL QL IA: NEGATIVE
SERVICE CMNT-IMP: NORMAL
SODIUM SERPL-SCNC: 140 MMOL/L (ref 134–144)
TRIGL SERPL-MCNC: 257 MG/DL (ref 0–149)
VLDLC SERPL CALC-MCNC: 51 MG/DL (ref 5–40)
WBC # BLD AUTO: 5.1 X10E3/UL (ref 3.4–10.8)

## 2020-07-10 ENCOUNTER — LAB VISIT (OUTPATIENT)
Dept: LAB | Facility: HOSPITAL | Age: 60
End: 2020-07-10
Attending: UROLOGY
Payer: COMMERCIAL

## 2020-07-10 DIAGNOSIS — Z85.46 HISTORY OF PROSTATE CANCER: ICD-10-CM

## 2020-07-10 LAB — COMPLEXED PSA SERPL-MCNC: <0.01 NG/ML (ref 0–4)

## 2020-07-10 PROCEDURE — 84153 ASSAY OF PSA TOTAL: CPT

## 2020-07-10 PROCEDURE — 36415 COLL VENOUS BLD VENIPUNCTURE: CPT

## 2020-07-13 ENCOUNTER — APPOINTMENT (OUTPATIENT)
Dept: LAB | Facility: HOSPITAL | Age: 60
End: 2020-07-13
Attending: UROLOGY
Payer: COMMERCIAL

## 2020-07-13 ENCOUNTER — OFFICE VISIT (OUTPATIENT)
Dept: UROLOGY | Facility: CLINIC | Age: 60
End: 2020-07-13
Payer: COMMERCIAL

## 2020-07-13 VITALS
SYSTOLIC BLOOD PRESSURE: 140 MMHG | WEIGHT: 149.94 LBS | DIASTOLIC BLOOD PRESSURE: 88 MMHG | TEMPERATURE: 98 F | RESPIRATION RATE: 18 BRPM | HEIGHT: 66 IN | BODY MASS INDEX: 24.1 KG/M2 | HEART RATE: 102 BPM

## 2020-07-13 DIAGNOSIS — R82.998 CALCIUM OXALATE CRYSTALS IN URINE: ICD-10-CM

## 2020-07-13 DIAGNOSIS — Z85.46 HISTORY OF PROSTATE CANCER: Primary | ICD-10-CM

## 2020-07-13 DIAGNOSIS — R31.29 MICROHEMATURIA: ICD-10-CM

## 2020-07-13 LAB
AMORPH CRY URNS QL MICRO: ABNORMAL
BACTERIA #/AREA URNS HPF: ABNORMAL /HPF
CAOX CRY URNS QL MICRO: ABNORMAL
MICROSCOPIC COMMENT: ABNORMAL
RBC #/AREA URNS HPF: 1 /HPF (ref 0–4)
WBC #/AREA URNS HPF: 1 /HPF (ref 0–5)

## 2020-07-13 PROCEDURE — 3008F PR BODY MASS INDEX (BMI) DOCUMENTED: ICD-10-PCS | Mod: CPTII,S$GLB,, | Performed by: UROLOGY

## 2020-07-13 PROCEDURE — 99999 PR PBB SHADOW E&M-EST. PATIENT-LVL IV: ICD-10-PCS | Mod: PBBFAC,,, | Performed by: UROLOGY

## 2020-07-13 PROCEDURE — 99999 PR PBB SHADOW E&M-EST. PATIENT-LVL IV: CPT | Mod: PBBFAC,,, | Performed by: UROLOGY

## 2020-07-13 PROCEDURE — 99214 PR OFFICE/OUTPT VISIT, EST, LEVL IV, 30-39 MIN: ICD-10-PCS | Mod: S$GLB,,, | Performed by: UROLOGY

## 2020-07-13 PROCEDURE — 3008F BODY MASS INDEX DOCD: CPT | Mod: CPTII,S$GLB,, | Performed by: UROLOGY

## 2020-07-13 PROCEDURE — 81000 URINALYSIS NONAUTO W/SCOPE: CPT

## 2020-07-13 PROCEDURE — 99214 OFFICE O/P EST MOD 30 MIN: CPT | Mod: S$GLB,,, | Performed by: UROLOGY

## 2020-07-13 NOTE — PROGRESS NOTES
[facility] Urology Progress Note     Jones Germain is a 60 y.o. male who presents for prostate cancer follow up     Originally referred by Dr. Wildre after finding of prostate nodule on ERIBERTO, which I confirmed on exam to left of central sulcus, and he had a PSA of 2.0 He underwent TRUS/biopsy on 9/25/18 revealing a 57.3g gland with 5/15 cores aiden 6 prostate cancer, largely left sided, with small volume on right. He initially elected active surveillance and returned 6 months later with interval psa rise to 2.7 and progression of his bothersome LUTS with AUA SS 17/5. After careful consideration and extensive discussion of management options, he elected to proceed with robotic assisted radical prostatectomy     5/29/19: 1. Robot-assisted laparoscopic radical prostatectomy, right nerve sparing 2. Bilateral pelvic lymphadenectomy  Findings: moderate sigmoid adhesions, significant median lobe requiring extended bladder neck dissection and necessitating bladder neck reconstruction, no leak on testing anastamosis   CARLOTTA drain fluid creatinine equivalent with serum POD 1 and CARLOTTA output was minimal and did not increase upon ambulation, and despite bladder neck reconstruction, anastomosis had no intraoperative evidence of urine leak when tested, so CARLOTTA removed   Initial postop cystogram on 6/6/19 had a very small area of anastomotic leak which resolved by repeat cystogram on 6/11/19 at which time Lynne catheter was removed.  PATHOLOGY: [id number] aiden 3+3 negative margin     7/16/19: Essentially continent, Leakage is usually just right after urinating if doesn't completely empty. Rare jeana cough sneeze or moving furniture. Dry at night  Sleeping through the night - had ntf 3-4x preop. If bladder full has very normal voiding with good stream. Occasionally has to double void to empty.  Has cut back to half pack from 1 on cigarettes - using filters as well. Delayed kegel efforts because sore but is now     9/30/19: PSA 9/16/19 is  <0.01. Pad to work for safety. Every day is dry. 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  Only real problem is if he has to run to the bathroom and has limited amount of time to do it on a break, may have some postvoid dribble which isnt a leak.  No longer having true stress urinary incontinence.  No leak with cough, sneeze, passing flatus.  Still doing Kegel exercises. Pelvic, perineal, testicular soreness has resolved as of 2 weeks ago, feeling well. Still smoking slightly over half a pack per day. No erections. Has sensation, can climax     1/21/10: psa undetectable, interim visit for CP found to have aortic ectasia and mural thrombus, down to 1/4 pack per day cigarettes, started on Lexapro for his anxiety and depression  Today he notes he has employed nicotine patch and is only having cigarettes here and there.  No cigarettes in the last 48 hr.  He is essentially continent.  He wears a pad to school just in case of leakage or emergency but has been okay, and his pads have been dry during the day.  He has been able to better employ timed voiding between classes and otherwise stress urinary incontinence has resolved and he has no urinary urgency or urge incontinence. No spontaneous erectile function at this time  Udip with small blood. If persists in future, consider MH workup or UA micro    He returns today noting:  PSA 7/10/20 is <0.01 undetectable. Followed up with Dr Wilder last month. HIV RNA undetectable. Has not followed up with cardiology. CovidABs negative  He is doing well from a continence standpoint endo longer wears a pad because he has not needed one.  No significant stress urinary incontinence.  Occasional slow stream.  Sometimes has urgency.  He does have a history of kidney stones, last episode in 2005  Urinalysis dipstick again has trace blood and is otherwise negative.  He denies chest pain or tightness in his chest or shortness  "of breath.  He has pending appointment with Dr. Powers, as he is changing cardiologists    ROS: A comprehensive 10 system review was performed and is negative except as noted above in HPI    PHYSICAL EXAM:    Vitals:    07/13/20 1053   BP: (!) 140/88   Pulse: 102   Resp: 18   Temp: 98 °F (36.7 °C)     Body mass index is 24.2 kg/m². Weight: 68 kg (149 lb 14.6 oz) Height: 5' 6" (167.6 cm)       General: Alert, cooperative, no distress, appears stated age   Head: Normocephalic, without obvious abnormality, atraumatic   Eyes: PERRL, conjunctiva/corneas clear   Lungs: Respirations unlabored   Heart: Warm and well perfused   Abdomen: soft NT ND lap incisions well healed no hernia  Extremities: Extremities normal, atraumatic, no cyanosis or edema   Skin: Skin color, texture, turgor normal, no rashes or lesions   Psych: Appropriate   Neurologic: Non-focal       ASSESSMENT   1. Microhematuria  Urinalysis Microscopic    Urinalysis   2. History of prostate cancer  Prostate Specific Antigen, Diagnostic    Prostate Specific Antigen, Diagnostic   3. Calcium oxalate crystals in urine  X-Ray Abdomen AP 1 View       Plan    Doing very well greater than 1 year post prostatectomy with good pathologic outcome and no evidence of disease.  PSA screening will not continue every 6 months out to year 5, then annually.  He has otherwise done well and is continent.  He is not concerned with postprostatectomy ED at this time and we did briefly review options for both rehabilitative an on demand pathways, which he is not interested in further discussion at the present but will notify us in the future if he would like to discuss further.    As urinalysis again has trace blood, we did discuss proceeding with urinalysis microscopic exam to evaluate for micro hematuria.  If true micro hematuria, greater than 5 red blood cells per high-power field, is present, would then get CT urogram and cystoscopy.  He does have a remote history of kidney stones " which may need further evaluation.    Will chart check results of UA and UA micro, otherwise he will have a PSA lab visit in 6 months and lab collect urinalysis to reassess blood in the urine.  I will then see him back 6 months later, 1 year from now, with additional PSA prior.    Encouraged continued Cardiology, primary care follow-up, and smoking cessation.  Otherwise doing quite well    20 mins spent in encounter, over half in counseling    * of note, prior to closing encounter, urinalysis microscopic exam did not have any significant micro hematuria with only 1 red blood cell per high-power field but did demonstrate calcium oxalate crystals in the urine, and with his history of stones, advised to screening KUB to evaluate for any current stone burden which I will chart check and advise further on

## 2020-07-16 ENCOUNTER — HOSPITAL ENCOUNTER (OUTPATIENT)
Dept: RADIOLOGY | Facility: HOSPITAL | Age: 60
Discharge: HOME | End: 2020-07-16
Attending: UROLOGY
Payer: COMMERCIAL

## 2020-07-16 DIAGNOSIS — R82.998 CALCIUM OXALATE CRYSTALS IN URINE: ICD-10-CM

## 2020-07-16 PROCEDURE — 74018 XR ABDOMEN AP 1 VIEW: ICD-10-PCS | Mod: 26,,, | Performed by: RADIOLOGY

## 2020-07-16 PROCEDURE — 74018 RADEX ABDOMEN 1 VIEW: CPT | Mod: TC,FY

## 2020-07-16 PROCEDURE — 74018 RADEX ABDOMEN 1 VIEW: CPT | Mod: 26,,, | Performed by: RADIOLOGY

## 2020-08-11 DIAGNOSIS — N20.0 KIDNEY STONES: Primary | ICD-10-CM

## 2020-10-07 ENCOUNTER — OFFICE VISIT (OUTPATIENT)
Dept: FAMILY MEDICINE | Facility: CLINIC | Age: 60
End: 2020-10-07
Payer: COMMERCIAL

## 2020-10-07 VITALS
TEMPERATURE: 99 F | DIASTOLIC BLOOD PRESSURE: 82 MMHG | RESPIRATION RATE: 16 BRPM | WEIGHT: 150 LBS | OXYGEN SATURATION: 97 % | SYSTOLIC BLOOD PRESSURE: 134 MMHG | HEIGHT: 66 IN | HEART RATE: 86 BPM | BODY MASS INDEX: 24.11 KG/M2

## 2020-10-07 DIAGNOSIS — M54.31 SCIATICA OF RIGHT SIDE: Primary | ICD-10-CM

## 2020-10-07 DIAGNOSIS — Z23 NEED FOR SHINGLES VACCINE: ICD-10-CM

## 2020-10-07 PROCEDURE — 3008F PR BODY MASS INDEX (BMI) DOCUMENTED: ICD-10-PCS | Mod: S$GLB,,, | Performed by: INTERNAL MEDICINE

## 2020-10-07 PROCEDURE — 99213 OFFICE O/P EST LOW 20 MIN: CPT | Mod: 25,S$GLB,, | Performed by: INTERNAL MEDICINE

## 2020-10-07 PROCEDURE — 96372 PR INJECTION,THERAP/PROPH/DIAG2ST, IM OR SUBCUT: ICD-10-PCS | Mod: S$GLB,,, | Performed by: INTERNAL MEDICINE

## 2020-10-07 PROCEDURE — 99213 PR OFFICE/OUTPT VISIT, EST, LEVL III, 20-29 MIN: ICD-10-PCS | Mod: 25,S$GLB,, | Performed by: INTERNAL MEDICINE

## 2020-10-07 PROCEDURE — 3008F BODY MASS INDEX DOCD: CPT | Mod: S$GLB,,, | Performed by: INTERNAL MEDICINE

## 2020-10-07 PROCEDURE — 96372 THER/PROPH/DIAG INJ SC/IM: CPT | Mod: S$GLB,,, | Performed by: INTERNAL MEDICINE

## 2020-10-07 RX ORDER — CYCLOBENZAPRINE HCL 5 MG
TABLET ORAL
Qty: 60 TABLET | Refills: 0 | Status: SHIPPED | OUTPATIENT
Start: 2020-10-07 | End: 2020-10-14

## 2020-10-07 RX ORDER — METHYLPREDNISOLONE ACETATE 80 MG/ML
80 INJECTION, SUSPENSION INTRA-ARTICULAR; INTRALESIONAL; INTRAMUSCULAR; SOFT TISSUE
Status: COMPLETED | OUTPATIENT
Start: 2020-10-07 | End: 2020-10-07

## 2020-10-07 RX ORDER — ADJUVANT AS01B/PF, VIAL 1 OF 2
1 VIAL (ML) INTRAMUSCULAR ONCE
Qty: 0.5 ML | Refills: 0 | Status: SHIPPED | OUTPATIENT
Start: 2020-10-07 | End: 2020-10-07

## 2020-10-07 RX ORDER — GABAPENTIN 300 MG/1
300 CAPSULE ORAL 3 TIMES DAILY
Qty: 60 CAPSULE | Refills: 1 | Status: SHIPPED | OUTPATIENT
Start: 2020-10-07 | End: 2020-11-11 | Stop reason: SDUPTHER

## 2020-10-07 RX ORDER — METHYLPREDNISOLONE ACETATE 80 MG/ML
80 INJECTION, SUSPENSION INTRA-ARTICULAR; INTRALESIONAL; INTRAMUSCULAR; SOFT TISSUE
Status: DISCONTINUED | OUTPATIENT
Start: 2020-10-07 | End: 2020-10-07

## 2020-10-07 RX ORDER — GABAPENTIN 300 MG/1
300 CAPSULE ORAL 3 TIMES DAILY
Qty: 60 CAPSULE | Refills: 1 | Status: SHIPPED | OUTPATIENT
Start: 2020-10-07 | End: 2020-10-07

## 2020-10-07 RX ORDER — CYCLOBENZAPRINE HCL 5 MG
TABLET ORAL
Qty: 60 TABLET | Refills: 1 | Status: SHIPPED | OUTPATIENT
Start: 2020-10-07 | End: 2020-10-07

## 2020-10-07 RX ADMIN — METHYLPREDNISOLONE ACETATE 80 MG: 80 INJECTION, SUSPENSION INTRA-ARTICULAR; INTRALESIONAL; INTRAMUSCULAR; SOFT TISSUE at 02:10

## 2020-10-07 NOTE — PROGRESS NOTES
SUBJECTIVE:    Patient ID: Jones Germain is a 60 y.o. male.    Chief Complaint: Sciatica    HPI     Two weeks ago he cleaned his desk and the next day he began to feel back pain-the pain is in the lateral right buttock and radiates to the post-lat thigh and calf-ibuprofen does help it-bengay helps-ice helps-heat makes it worse-supine position hurts-sitting some discomfort standing makes it worse-walking makes it worse    Office Visit on 07/13/2020   Component Date Value Ref Range Status    RBC, UA 07/13/2020 1  0 - 4 /hpf Final    WBC, UA 07/13/2020 1  0 - 5 /hpf Final    Bacteria 07/13/2020 Few* None-Occ /hpf Final    Ca Oxalate Awilda, UA 07/13/2020 Few  None-Moderate Final    Amorphous, UA 07/13/2020 Moderate  None-Moderate Final    Microscopic Comment 07/13/2020 SEE COMMENT   Final   Lab Visit on 07/10/2020   Component Date Value Ref Range Status    PSA DIAGNOSTIC 07/10/2020 <0.01  0.00 - 4.00 ng/mL Final   Office Visit on 06/15/2020   Component Date Value Ref Range Status    Antibody SARS CoV-2 06/19/2020 Negative  Negative Final    WBC 06/19/2020 5.1  3.4 - 10.8 x10E3/uL Final    RBC 06/19/2020 5.25  4.14 - 5.80 x10E6/uL Final    Hemoglobin 06/19/2020 16.9  13.0 - 17.7 g/dL Final    Hematocrit 06/19/2020 50.2  37.5 - 51.0 % Final    Mean Corpuscular Volume 06/19/2020 96  79 - 97 fL Final    Mean Corpuscular Hemoglobin 06/19/2020 32.2  26.6 - 33.0 pg Final    Mean Corpuscular Hemoglobin Conc 06/19/2020 33.7  31.5 - 35.7 g/dL Final    RDW 06/19/2020 12.6  11.6 - 15.4 % Final    Platelets 06/19/2020 255  150 - 450 x10E3/uL Final    Neutrophils 06/19/2020 60  Not Estab. % Final    Lymph% 06/19/2020 29  Not Estab. % Final    Mono% 06/19/2020 8  Not Estab. % Final    Eosinophil% 06/19/2020 2  Not Estab. % Final    Basophil% 06/19/2020 0  Not Estab. % Final    Neutrophils Absolute 06/19/2020 3.0  1.4 - 7.0 x10E3/uL Final    Lymph # 06/19/2020 1.5  0.7 - 3.1 x10E3/uL Final    Mono #  06/19/2020 0.4  0.1 - 0.9 x10E3/uL Final    Eos # 06/19/2020 0.1  0.0 - 0.4 x10E3/uL Final    Baso # 06/19/2020 0.0  0.0 - 0.2 x10E3/uL Final    Immature Granulocytes 06/19/2020 1  Not Estab. % Final    Immature Grans (Abs) 06/19/2020 0.0  0.0 - 0.1 x10E3/uL Final    Glucose 06/19/2020 92  65 - 99 mg/dL Final    BUN, Bld 06/19/2020 8  8 - 27 mg/dL Final    Creatinine 06/19/2020 0.88  0.76 - 1.27 mg/dL Final    eGFR if non African American 06/19/2020 93  >59 mL/min/1.73 Final    eGFR if African American 06/19/2020 107  >59 mL/min/1.73 Final    BUN/Creatinine Ratio 06/19/2020 9* 10 - 24 Final    Sodium 06/19/2020 140  134 - 144 mmol/L Final    Potassium 06/19/2020 4.5  3.5 - 5.2 mmol/L Final    Chloride 06/19/2020 100  96 - 106 mmol/L Final    CO2 06/19/2020 22  20 - 29 mmol/L Final    Calcium 06/19/2020 9.2  8.6 - 10.2 mg/dL Final    Total Protein 06/19/2020 7.1  6.0 - 8.5 g/dL Final    Albumin 06/19/2020 4.8  3.8 - 4.8 g/dL Final    Globulin, Total 06/19/2020 2.3  1.5 - 4.5 g/dL Final    Albumin/Globulin Ratio 06/19/2020 2.1  1.2 - 2.2 Final    Total Bilirubin 06/19/2020 0.4  0.0 - 1.2 mg/dL Final    Alkaline Phosphatase 06/19/2020 92  39 - 117 IU/L Final    AST 06/19/2020 18  0 - 40 IU/L Final    ALT 06/19/2020 20  0 - 44 IU/L Final    Cholesterol 06/19/2020 176  100 - 199 mg/dL Final    Triglycerides 06/19/2020 257* 0 - 149 mg/dL Final    HDL 06/19/2020 29* >39 mg/dL Final    VLDL Cholesterol Reid 06/19/2020 51* 5 - 40 mg/dL Final    LDL Calculated 06/19/2020 96  0 - 99 mg/dL Final    HIV-1 RNA by PCR, Qn 06/19/2020 <20  copies/mL Final    log10 HIV-1 RNA 06/19/2020 CANCELED  [id number]/mL Final-Edited    Hemoglobin A1C 06/19/2020 5.1  4.8 - 5.6 % Final    Quantiferon TB Gold (Incubated) 06/19/2020 Incubation performed.   Final    QuantiFERON Criteria 06/19/2020 Comment   Final    QuantiFERON TB1 Ag Value 06/19/2020 0.04  IU/mL Final    QuantiFERON TB2 Ag Value 06/19/2020 0.05   IU/mL Final    Quantiferon Nil Value 2020 0.03  IU/mL Final    Quantiferon Mitogen Value 2020 >10.00  IU/mL Final    QuantiFERON-TB Gold Plus 2020 Negative  Negative Final       Past Medical History:   Diagnosis Date    Adverse effect of hepatitis B vaccine     non responder    Hepatitis A     High frequency hearing loss     HIV (human immunodeficiency virus infection)     On COM/cRix until kidney stones, sallie 200    Hypercholesterolemia     Hyperlipidemia     Kidney stone     [facility], Lithotripsy    Lipoatrophy     Mild    Pertussis     As a child    Prostate cancer     Secondary syphilis     Smoker     Vertigo 2018    Due to Ear Infection     Past Surgical History:   Procedure Laterality Date    LITHOTRIPSY      ROBOT-ASSISTED LAPAROSCOPIC PROSTATECTOMY N/A 2019    Procedure: ROBOTIC PROSTATECTOMY;  Surgeon: Baljinder Johnson MD;  Location: [facility] OR;  Service: Urology;  Laterality: N/A;    TRANSRECTAL BIOPSY OF PROSTATE WITH ULTRASOUND GUIDANCE N/A 2018    Procedure: BIOPSY, PROSTATE, RECTAL APPROACH, WITH US GUIDANCE;  Surgeon: Baljinder Johnson MD;  Location: [facility] OR;  Service: Urology;  Laterality: N/A;     Family History   Problem Relation Age of Onset    Diabetes Mother          in a MVC    COPD Father     Lung cancer Paternal Grandmother        Marital Status: Single  Alcohol History:  reports no history of alcohol use.  Tobacco History:  reports that he has been smoking cigarettes. He has been smoking about 1.00 pack per day. He has never used smokeless tobacco.  Drug History:  reports no history of drug use.    Review of patient's allergies indicates:  No Known Allergies    Current Outpatient Medications:     aspirin 81 MG Chew, Take 81 mg by mouth once daily. , Disp: , Rfl:     efavirenz (SUSTIVA) 600 mg Tab, Take 1 tablet (600 mg total) by mouth every evening., Disp: 90 tablet, Rfl: 1    emtricitabine-tenofovir alafen (DESCOVY)  200-25 mg Tab, Take 1 tablet by mouth once daily., Disp: 90 tablet, Rfl: 2    escitalopram oxalate (LEXAPRO) 10 MG tablet, Take 1 tablet (10 mg total) by mouth once daily., Disp: 90 tablet, Rfl: 3    nitroGLYCERIN (NITROSTAT) 0.4 MG SL tablet, If you have chest pain, place 1 tablet under tongue every 5 min as needed for 3 doses.  Then call your doctor afterwards, Disp: 15 tablet, Rfl: 0    pravastatin (PRAVACHOL) 40 MG tablet, Take 1 tablet (40 mg total) by mouth once daily., Disp: 90 tablet, Rfl: 2    adjuvant AS01B, PF,vial 1 of 2 (SHINGRIX ADJUVANT COMPONENT-PF) Susp, Inject 0.5 mLs into the muscle once. for 1 dose, Disp: 0.5 mL, Rfl: 0    cyclobenzaprine (FLEXERIL) 5 MG tablet, One po in am and afternoon and two at HS, Disp: 60 tablet, Rfl: 0    gabapentin (NEURONTIN) 300 MG capsule, Take 1 capsule (300 mg total) by mouth 3 (three) times daily., Disp: 60 capsule, Rfl: 1  No current facility-administered medications for this visit.     Review of Systems   Constitutional: Negative for chills, fatigue, fever and unexpected weight change.   HENT: Negative for congestion, ear pain, hearing loss, sinus pain and sore throat.    Eyes: Negative for pain and visual disturbance.   Respiratory: Negative for cough, shortness of breath and wheezing.    Cardiovascular: Negative for chest pain, palpitations and leg swelling.   Gastrointestinal: Negative for abdominal pain, blood in stool, constipation, diarrhea, nausea and vomiting.   Endocrine: Negative for cold intolerance and heat intolerance.   Genitourinary: Negative for difficulty urinating, dysuria, frequency, testicular pain and urgency.   Musculoskeletal: Negative for back pain, joint swelling and neck pain.   Skin: Negative for pallor and rash.   Neurological: Negative for dizziness, tremors, weakness, numbness and headaches.   Hematological: Does not bruise/bleed easily.   Psychiatric/Behavioral: Negative for agitation, sleep disturbance and suicidal ideas.      "     Objective:      Vitals:    10/07/20 1412   BP: 134/82   Pulse: 86   Resp: 16   Temp: 98.5 °F (36.9 °C)   SpO2: 97%   Weight: 68 kg (150 lb)   Height: 5' 6" (1.676 m)     Physical Exam  Vitals signs and nursing note reviewed.   Constitutional:       Appearance: He is well-developed.   Eyes:      General: Lids are normal.      Conjunctiva/sclera: Conjunctivae normal.      Pupils: Pupils are equal, round, and reactive to light.      Right eye: Pupil is round and reactive.      Left eye: Pupil is round and reactive.   Neck:      Musculoskeletal: Normal range of motion and neck supple.      Thyroid: No thyromegaly.      Vascular: No carotid bruit or JVD.   Cardiovascular:      Rate and Rhythm: Normal rate and regular rhythm.      Heart sounds: Normal heart sounds. No murmur. No friction rub. No gallop.    Pulmonary:      Effort: No respiratory distress.      Breath sounds: Normal breath sounds. No wheezing or rales.   Abdominal:      General: Bowel sounds are normal.      Palpations: Abdomen is soft. Abdomen is not rigid. There is no mass.      Tenderness: There is no abdominal tenderness. There is no guarding.   Skin:     General: Skin is warm and dry.      Capillary Refill: Capillary refill takes less than 2 seconds.      Findings: No lesion or rash.      Nails: There is no clubbing.     Neurological:      Mental Status: He is alert and oriented to person, place, and time.      Comments: Positive straight leg   Psychiatric:         Behavior: Behavior normal. Behavior is cooperative.         Thought Content: Thought content normal.         Judgment: Judgment normal.           Assessment:       1. Sciatica of right side    2. Need for shingles vaccine         Plan:       Sciatica of right side  -     gabapentin (NEURONTIN) 300 MG capsule; Take 1 capsule (300 mg total) by mouth 3 (three) times daily.  Dispense: 60 capsule; Refill: 1  -     cyclobenzaprine (FLEXERIL) 5 MG tablet; One po in am and afternoon and two at " HS  Dispense: 60 tablet; Refill: 0-     methylPREDNISolone acetate injection 80 mg  -     methylPREDNISolone sod suc(PF) injection 125 mg    Need for shingles vaccine  -     adjuvant AS01B, PF,vial 1 of 2 (SHINGRIX ADJUVANT COMPONENT-PF) Susp; Inject 0.5 mLs into the muscle once. for 1 dose  Dispense: 0.5 mL; Refill: 0            Follow up in about 1 week (around 10/14/2020) for FOLLOW-UP STATUS, FOLLOW UP MEDICATIONS-sciatica-virtual visit.        10/7/2020 Ninfa Ulloa M.D.

## 2020-10-11 NOTE — PROGRESS NOTES
SUBJECTIVE:    Patient ID: Jones Germain is a 60 y.o. male.    Chief Complaint: No chief complaint on file.    HPI     Here for follow-up sciatic pain-Last office visit :    Sciatica of right side  -     gabapentin (NEURONTIN) 300 MG capsule; Take 1 capsule (300 mg total) by mouth 3 (three) times daily  -     cyclobenzaprine (FLEXERIL) 5 MG tablet; One po in am and afternoon and two at HS      -     methy-PREDNISolone acetate injection 80 mg        -     methyl-PREDNISolone sod suc(PF) injection 125 mg    He says he is better today-felt great two days after injection then gradually getting better-pain is a 4/5 out of 10-all are better but the calf still is painful-he is trying to be very careful-trying to get up and down at school as sitting aggravates him-sleeping fairly well the l;ast two nights-trying to avoid bending down    Says [facility] rx coming to his home and he needs to have the injection      Office Visit on 07/13/2020   Component Date Value Ref Range Status    RBC, UA 07/13/2020 1  0 - 4 /hpf Final    WBC, UA 07/13/2020 1  0 - 5 /hpf Final    Bacteria 07/13/2020 Few* None-Occ /hpf Final    Ca Oxalate Awilda, UA 07/13/2020 Few  None-Moderate Final    Amorphous, UA 07/13/2020 Moderate  None-Moderate Final    Microscopic Comment 07/13/2020 SEE COMMENT   Final   Lab Visit on 07/10/2020   Component Date Value Ref Range Status    PSA DIAGNOSTIC 07/10/2020 <0.01  0.00 - 4.00 ng/mL Final   Office Visit on 06/15/2020   Component Date Value Ref Range Status    Antibody SARS CoV-2 06/19/2020 Negative  Negative Final    WBC 06/19/2020 5.1  3.4 - 10.8 x10E3/uL Final    RBC 06/19/2020 5.25  4.14 - 5.80 x10E6/uL Final    Hemoglobin 06/19/2020 16.9  13.0 - 17.7 g/dL Final    Hematocrit 06/19/2020 50.2  37.5 - 51.0 % Final    Mean Corpuscular Volume 06/19/2020 96  79 - 97 fL Final    Mean Corpuscular Hemoglobin 06/19/2020 32.2  26.6 - 33.0 pg Final    Mean Corpuscular Hemoglobin Conc 06/19/2020 33.7  31.5  - 35.7 g/dL Final    RDW 06/19/2020 12.6  11.6 - 15.4 % Final    Platelets 06/19/2020 255  150 - 450 x10E3/uL Final    Neutrophils 06/19/2020 60  Not Estab. % Final    Lymph% 06/19/2020 29  Not Estab. % Final    Mono% 06/19/2020 8  Not Estab. % Final    Eosinophil% 06/19/2020 2  Not Estab. % Final    Basophil% 06/19/2020 0  Not Estab. % Final    Neutrophils Absolute 06/19/2020 3.0  1.4 - 7.0 x10E3/uL Final    Lymph # 06/19/2020 1.5  0.7 - 3.1 x10E3/uL Final    Mono # 06/19/2020 0.4  0.1 - 0.9 x10E3/uL Final    Eos # 06/19/2020 0.1  0.0 - 0.4 x10E3/uL Final    Baso # 06/19/2020 0.0  0.0 - 0.2 x10E3/uL Final    Immature Granulocytes 06/19/2020 1  Not Estab. % Final    Immature Grans (Abs) 06/19/2020 0.0  0.0 - 0.1 x10E3/uL Final    Glucose 06/19/2020 92  65 - 99 mg/dL Final    BUN, Bld 06/19/2020 8  8 - 27 mg/dL Final    Creatinine 06/19/2020 0.88  0.76 - 1.27 mg/dL Final    eGFR if non African American 06/19/2020 93  >59 mL/min/1.73 Final    eGFR if African American 06/19/2020 107  >59 mL/min/1.73 Final    BUN/Creatinine Ratio 06/19/2020 9* 10 - 24 Final    Sodium 06/19/2020 140  134 - 144 mmol/L Final    Potassium 06/19/2020 4.5  3.5 - 5.2 mmol/L Final    Chloride 06/19/2020 100  96 - 106 mmol/L Final    CO2 06/19/2020 22  20 - 29 mmol/L Final    Calcium 06/19/2020 9.2  8.6 - 10.2 mg/dL Final    Total Protein 06/19/2020 7.1  6.0 - 8.5 g/dL Final    Albumin 06/19/2020 4.8  3.8 - 4.8 g/dL Final    Globulin, Total 06/19/2020 2.3  1.5 - 4.5 g/dL Final    Albumin/Globulin Ratio 06/19/2020 2.1  1.2 - 2.2 Final    Total Bilirubin 06/19/2020 0.4  0.0 - 1.2 mg/dL Final    Alkaline Phosphatase 06/19/2020 92  39 - 117 IU/L Final    AST 06/19/2020 18  0 - 40 IU/L Final    ALT 06/19/2020 20  0 - 44 IU/L Final    Cholesterol 06/19/2020 176  100 - 199 mg/dL Final    Triglycerides 06/19/2020 257* 0 - 149 mg/dL Final    HDL 06/19/2020 29* >39 mg/dL Final    VLDL Cholesterol Reid 06/19/2020 51* 5 -  40 mg/dL Final    LDL Calculated 2020 96  0 - 99 mg/dL Final    HIV-1 RNA by PCR, Qn 2020 <20  copies/mL Final    log10 HIV-1 RNA 2020 CANCELED  eud04edon/mL Final-Edited    Hemoglobin A1C 2020 5.1  4.8 - 5.6 % Final    Quantiferon TB Gold (Incubated) 2020 Incubation performed.   Final    QuantiFERON Criteria 2020 Comment   Final    QuantiFERON TB1 Ag Value 2020 0.04  IU/mL Final    QuantiFERON TB2 Ag Value 2020 0.05  IU/mL Final    Quantiferon Nil Value 2020 0.03  IU/mL Final    Quantiferon Mitogen Value 2020 >10.00  IU/mL Final    QuantiFERON-TB Gold Plus 2020 Negative  Negative Final       Past Medical History:   Diagnosis Date    Adverse effect of hepatitis B vaccine     non responder    Hepatitis A     High frequency hearing loss     HIV (human immunodeficiency virus infection)     On COM/cRix until kidney stones, sallie 200    Hypercholesterolemia     Hyperlipidemia     Kidney stone     [facility], Lithotripsy    Lipoatrophy     Mild    Pertussis     As a child    Prostate cancer     Secondary syphilis     Smoker     Vertigo 2018    Due to Ear Infection     Past Surgical History:   Procedure Laterality Date    LITHOTRIPSY      ROBOT-ASSISTED LAPAROSCOPIC PROSTATECTOMY N/A 2019    Procedure: ROBOTIC PROSTATECTOMY;  Surgeon: Baljinder Johnson MD;  Location[facility] OR;  Service: Urology;  Laterality: N/A;    TRANSRECTAL BIOPSY OF PROSTATE WITH ULTRASOUND GUIDANCE N/A 2018    Procedure: BIOPSY, PROSTATE, RECTAL APPROACH, WITH US GUIDANCE;  Surgeon: Baljinder Johnson MD;  Location[facility] OR;  Service: Urology;  Laterality: N/A;     Family History   Problem Relation Age of Onset    Diabetes Mother          in a MVC    COPD Father     Lung cancer Paternal Grandmother        Marital Status: Single  Alcohol History:  reports no history of alcohol use.  Tobacco History:  reports that he has been  smoking cigarettes. He has been smoking about 1.00 pack per day. He has never used smokeless tobacco.  Drug History:  reports no history of drug use.    Review of patient's allergies indicates:  No Known Allergies    Current Outpatient Medications:     aspirin 81 MG Chew, Take 81 mg by mouth once daily. , Disp: , Rfl:     cyclobenzaprine (FLEXERIL) 5 MG tablet, One po in am and afternoon and two at HS, Disp: 60 tablet, Rfl: 0    efavirenz (SUSTIVA) 600 mg Tab, Take 1 tablet (600 mg total) by mouth every evening., Disp: 90 tablet, Rfl: 1    emtricitabine-tenofovir alafen (DESCOVY) 200-25 mg Tab, Take 1 tablet by mouth once daily., Disp: 90 tablet, Rfl: 2    escitalopram oxalate (LEXAPRO) 10 MG tablet, Take 1 tablet (10 mg total) by mouth once daily., Disp: 90 tablet, Rfl: 3    gabapentin (NEURONTIN) 300 MG capsule, Take 1 capsule (300 mg total) by mouth 3 (three) times daily., Disp: 60 capsule, Rfl: 1    nitroGLYCERIN (NITROSTAT) 0.4 MG SL tablet, If you have chest pain, place 1 tablet under tongue every 5 min as needed for 3 doses.  Then call your doctor afterwards, Disp: 15 tablet, Rfl: 0    pravastatin (PRAVACHOL) 40 MG tablet, Take 1 tablet (40 mg total) by mouth once daily., Disp: 90 tablet, Rfl: 2    Review of Systems       Objective:      There were no vitals filed for this visit.  Physical Exam      Assessment:       No diagnosis found.     Plan:       There are no diagnoses linked to this encounter.  No follow-ups on file.        10/14/2020 Ninfa Ulloa M.D.

## 2020-10-12 DIAGNOSIS — Z23 NEED FOR SHINGLES VACCINE: Primary | ICD-10-CM

## 2020-10-12 NOTE — TELEPHONE ENCOUNTER
Mr Marcellus called C/o his sciatic pain is worse.    He also would like an order for shingles vaccine to [facility]  [place]

## 2020-10-13 RX ORDER — ADJUVANT AS01B/PF, VIAL 1 OF 2
1 VIAL (ML) INTRAMUSCULAR ONCE
Qty: 0.5 ML | Refills: 0 | Status: SHIPPED | OUTPATIENT
Start: 2020-10-13 | End: 2020-10-13

## 2020-10-14 ENCOUNTER — OFFICE VISIT (OUTPATIENT)
Dept: FAMILY MEDICINE | Facility: CLINIC | Age: 60
End: 2020-10-14
Payer: COMMERCIAL

## 2020-10-14 DIAGNOSIS — M62.830 BACK MUSCLE SPASM: ICD-10-CM

## 2020-10-14 DIAGNOSIS — M54.31 SCIATICA OF RIGHT SIDE: Primary | ICD-10-CM

## 2020-10-14 PROCEDURE — 99213 OFFICE O/P EST LOW 20 MIN: CPT | Mod: 95,,, | Performed by: INTERNAL MEDICINE

## 2020-10-14 PROCEDURE — 99213 PR OFFICE/OUTPT VISIT, EST, LEVL III, 20-29 MIN: ICD-10-PCS | Mod: 95,,, | Performed by: INTERNAL MEDICINE

## 2020-10-14 RX ORDER — CYCLOBENZAPRINE HCL 10 MG
TABLET ORAL
Qty: 90 TABLET | Refills: 0 | Status: SHIPPED | OUTPATIENT
Start: 2020-10-14 | End: 2020-11-11 | Stop reason: SDUPTHER

## 2020-10-15 ENCOUNTER — IMMUNIZATION (OUTPATIENT)
Dept: PHARMACY | Facility: CLINIC | Age: 60
End: 2020-10-15
Payer: COMMERCIAL

## 2020-10-15 NOTE — PROGRESS NOTES
Subjective:        The chief complaint leading to consultation is: sciatica  The patient location is:  Home  Visit type: Virtual visit with synchronous audio/video or audio only  This was a video visit in lieu of in-person visit due to the coronavirus emergency. Patient acknowledged and consented to the video visit encounter.     HPI     Here for follow-up sciatic pain-Last office visit :    Sciatica of right side  -     gabapentin (NEURONTIN) 300 MG capsule; Take 1 capsule (300 mg total) by mouth 3 (three) times daily  -     cyclobenzaprine (FLEXERIL) 5 MG tablet; One po in am and afternoon and two at HS      -     methy-PREDNISolone acetate injection 80 mg        -     methyl-PREDNISolone sod suc(PF) injection 125 mg    He says he is better today-felt great two days after injection then gradually getting better-pain is a 4/5 out of 10-all are better but the calf still is painful-he is trying to be very careful-trying to get up and down at school as sitting aggravates him-sleeping fairly well the l;ast two nights-trying to avoid bending down    Says [facility] coming to his home and he needs to have the injection      Office Visit on 07/13/2020   Component Date Value Ref Range Status    RBC, UA 07/13/2020 1  0 - 4 /hpf Final    WBC, UA 07/13/2020 1  0 - 5 /hpf Final    Bacteria 07/13/2020 Few* None-Occ /hpf Final    Ca Oxalate Awilda, UA 07/13/2020 Few  None-Moderate Final    Amorphous, UA 07/13/2020 Moderate  None-Moderate Final    Microscopic Comment 07/13/2020 SEE COMMENT   Final   Lab Visit on 07/10/2020   Component Date Value Ref Range Status    PSA DIAGNOSTIC 07/10/2020 <0.01  0.00 - 4.00 ng/mL Final   Office Visit on 06/15/2020   Component Date Value Ref Range Status    Antibody SARS CoV-2 06/19/2020 Negative  Negative Final    WBC 06/19/2020 5.1  3.4 - 10.8 x10E3/uL Final    RBC 06/19/2020 5.25  4.14 - 5.80 x10E6/uL Final    Hemoglobin 06/19/2020 16.9  13.0 - 17.7 g/dL Final    Hematocrit  06/19/2020 50.2  37.5 - 51.0 % Final    Mean Corpuscular Volume 06/19/2020 96  79 - 97 fL Final    Mean Corpuscular Hemoglobin 06/19/2020 32.2  26.6 - 33.0 pg Final    Mean Corpuscular Hemoglobin Conc 06/19/2020 33.7  31.5 - 35.7 g/dL Final    RDW 06/19/2020 12.6  11.6 - 15.4 % Final    Platelets 06/19/2020 255  150 - 450 x10E3/uL Final    Neutrophils 06/19/2020 60  Not Estab. % Final    Lymph% 06/19/2020 29  Not Estab. % Final    Mono% 06/19/2020 8  Not Estab. % Final    Eosinophil% 06/19/2020 2  Not Estab. % Final    Basophil% 06/19/2020 0  Not Estab. % Final    Neutrophils Absolute 06/19/2020 3.0  1.4 - 7.0 x10E3/uL Final    Lymph # 06/19/2020 1.5  0.7 - 3.1 x10E3/uL Final    Mono # 06/19/2020 0.4  0.1 - 0.9 x10E3/uL Final    Eos # 06/19/2020 0.1  0.0 - 0.4 x10E3/uL Final    Baso # 06/19/2020 0.0  0.0 - 0.2 x10E3/uL Final    Immature Granulocytes 06/19/2020 1  Not Estab. % Final    Immature Grans (Abs) 06/19/2020 0.0  0.0 - 0.1 x10E3/uL Final    Glucose 06/19/2020 92  65 - 99 mg/dL Final    BUN, Bld 06/19/2020 8  8 - 27 mg/dL Final    Creatinine 06/19/2020 0.88  0.76 - 1.27 mg/dL Final    eGFR if non African American 06/19/2020 93  >59 mL/min/1.73 Final    eGFR if African American 06/19/2020 107  >59 mL/min/1.73 Final    BUN/Creatinine Ratio 06/19/2020 9* 10 - 24 Final    Sodium 06/19/2020 140  134 - 144 mmol/L Final    Potassium 06/19/2020 4.5  3.5 - 5.2 mmol/L Final    Chloride 06/19/2020 100  96 - 106 mmol/L Final    CO2 06/19/2020 22  20 - 29 mmol/L Final    Calcium 06/19/2020 9.2  8.6 - 10.2 mg/dL Final    Total Protein 06/19/2020 7.1  6.0 - 8.5 g/dL Final    Albumin 06/19/2020 4.8  3.8 - 4.8 g/dL Final    Globulin, Total 06/19/2020 2.3  1.5 - 4.5 g/dL Final    Albumin/Globulin Ratio 06/19/2020 2.1  1.2 - 2.2 Final    Total Bilirubin 06/19/2020 0.4  0.0 - 1.2 mg/dL Final    Alkaline Phosphatase 06/19/2020 92  39 - 117 IU/L Final    AST 06/19/2020 18  0 - 40 IU/L Final     ALT 06/19/2020 20  0 - 44 IU/L Final    Cholesterol 06/19/2020 176  100 - 199 mg/dL Final    Triglycerides 06/19/2020 257* 0 - 149 mg/dL Final    HDL 06/19/2020 29* >39 mg/dL Final    VLDL Cholesterol Reid 06/19/2020 51* 5 - 40 mg/dL Final    LDL Calculated 06/19/2020 96  0 - 99 mg/dL Final    HIV-1 RNA by PCR, Qn 06/19/2020 <20  copies/mL Final    log10 HIV-1 RNA 06/19/2020 CANCELED  [id number]/mL Final-Edited    Hemoglobin A1C 06/19/2020 5.1  4.8 - 5.6 % Final    Quantiferon TB Gold (Incubated) 06/19/2020 Incubation performed.   Final    QuantiFERON Criteria 06/19/2020 Comment   Final    QuantiFERON TB1 Ag Value 06/19/2020 0.04  IU/mL Final    QuantiFERON TB2 Ag Value 06/19/2020 0.05  IU/mL Final    Quantiferon Nil Value 06/19/2020 0.03  IU/mL Final    Quantiferon Mitogen Value 06/19/2020 >10.00  IU/mL Final    QuantiFERON-TB Gold Plus 06/19/2020 Negative  Negative Final       Past Surgical History:   Procedure Laterality Date    LITHOTRIPSY      ROBOT-ASSISTED LAPAROSCOPIC PROSTATECTOMY N/A 5/29/2019    Procedure: ROBOTIC PROSTATECTOMY;  Surgeon: Baljinder Johnson MD;  Location: [facility] OR;  Service: Urology;  Laterality: N/A;    TRANSRECTAL BIOPSY OF PROSTATE WITH ULTRASOUND GUIDANCE N/A 9/25/2018    Procedure: BIOPSY, PROSTATE, RECTAL APPROACH, WITH US GUIDANCE;  Surgeon: Baljinder Johnson MD;  Location: [facility] OR;  Service: Urology;  Laterality: N/A;     Past Medical History:   Diagnosis Date    Adverse effect of hepatitis B vaccine     non responder    Hepatitis A 1991    High frequency hearing loss     HIV (human immunodeficiency virus infection) 1995    On COM/cRix until kidney stones, sallie 200    Hypercholesterolemia     Hyperlipidemia     Kidney stone 2005    [facility], Lithotripsy    Lipoatrophy     Mild    Pertussis     As a child    Prostate cancer     Secondary syphilis 2002    Smoker     Vertigo 02/2018    Due to Ear Infection     Family History   Problem Relation Age of Onset     Diabetes Mother          in a MVC    COPD Father     Lung cancer Paternal Grandmother         Social History:   Marital Status: Single  Alcohol History:  reports no history of alcohol use.  Tobacco History:  reports that he has been smoking cigarettes. He has been smoking about 1.00 pack per day. He has never used smokeless tobacco.  Drug History:  reports no history of drug use.    Review of patient's allergies indicates:  No Known Allergies    Current Outpatient Medications   Medication Sig Dispense Refill    aspirin 81 MG Chew Take 81 mg by mouth once daily.       cyclobenzaprine (FLEXERIL) 10 MG tablet One po in am and afternoon and two at HS 90 tablet 0    efavirenz (SUSTIVA) 600 mg Tab Take 1 tablet (600 mg total) by mouth every evening. 90 tablet 1    emtricitabine-tenofovir alafen (DESCOVY) 200-25 mg Tab Take 1 tablet by mouth once daily. 90 tablet 2    escitalopram oxalate (LEXAPRO) 10 MG tablet Take 1 tablet (10 mg total) by mouth once daily. 90 tablet 3    gabapentin (NEURONTIN) 300 MG capsule Take 1 capsule (300 mg total) by mouth 3 (three) times daily. 60 capsule 1    nitroGLYCERIN (NITROSTAT) 0.4 MG SL tablet If you have chest pain, place 1 tablet under tongue every 5 min as needed for 3 doses.  Then call your doctor afterwards 15 tablet 0    pravastatin (PRAVACHOL) 40 MG tablet Take 1 tablet (40 mg total) by mouth once daily. 90 tablet 2     No current facility-administered medications for this visit.        Review of Systems   Constitutional: Negative for activity change and unexpected weight change.   HENT: Negative for hearing loss, rhinorrhea and trouble swallowing.    Eyes: Negative for discharge and visual disturbance.   Respiratory: Negative for chest tightness and wheezing.    Cardiovascular: Negative for chest pain and palpitations.   Gastrointestinal: Negative for blood in stool, constipation, diarrhea and vomiting.   Endocrine: Negative for polydipsia and polyuria.    Genitourinary: Negative for difficulty urinating, hematuria and urgency.   Musculoskeletal: Negative for arthralgias, joint swelling and neck pain.   Neurological: Negative for weakness and headaches.   Psychiatric/Behavioral: Negative for confusion and dysphoric mood.   All other systems reviewed and are negative.        Objective:        Physical Exam:   Physical Exam         Assessment:       1. Sciatica of right side    2. Back muscle spasm      Plan:   Sciatica of right side  -     cyclobenzaprine (FLEXERIL) 10 MG tablet; One po in am and afternoon and two at HS  Dispense: 90 tablet; Refill: 0    Back muscle spasm  -     cyclobenzaprine (FLEXERIL) 10 MG tablet; One po in am and afternoon and two at HS  Dispense: 90 tablet; Refill: 0      Follow up in about 4 weeks (around 11/11/2020) for FOLLOW-UP STATUS.    Total time spent with patient: 18 minutes    Each patient to whom he or she provides medical services by telemedicine is:  (1) informed of the relationship between the physician and patient and the respective role of any other health care provider with respect to management of the patient; and (2) notified that he or she may decline to receive medical services by telemedicine and may withdraw from such care at any time.    This note was created using "[employer] voice recognition software that occasionally misinterprets phrases or words.

## 2020-10-31 ENCOUNTER — HOSPITAL ENCOUNTER (EMERGENCY)
Facility: HOSPITAL | Age: 60
Discharge: HOME | End: 2020-10-31
Attending: EMERGENCY MEDICINE
Payer: COMMERCIAL

## 2020-10-31 VITALS
OXYGEN SATURATION: 98 % | HEIGHT: 66 IN | BODY MASS INDEX: 24.11 KG/M2 | SYSTOLIC BLOOD PRESSURE: 164 MMHG | RESPIRATION RATE: 20 BRPM | DIASTOLIC BLOOD PRESSURE: 78 MMHG | HEART RATE: 109 BPM | TEMPERATURE: 98 F | WEIGHT: 150 LBS

## 2020-10-31 DIAGNOSIS — M54.31 SCIATICA OF RIGHT SIDE: ICD-10-CM

## 2020-10-31 DIAGNOSIS — S70.01XA CONTUSION OF RIGHT HIP, INITIAL ENCOUNTER: Primary | ICD-10-CM
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
## 2020-10-31 PROCEDURE — 63600175 PHARM REV CODE 636 W HCPCS: Performed by: EMERGENCY MEDICINE

## 2020-10-31 PROCEDURE — 96374 THER/PROPH/DIAG INJ IV PUSH: CPT

## 2020-10-31 PROCEDURE — 99284 EMERGENCY DEPT VISIT MOD MDM: CPT | Mod: 25

## 2020-10-31 RX ORDER — MORPHINE SULFATE 2 MG/ML
7 INJECTION, SOLUTION INTRAMUSCULAR; INTRAVENOUS
Status: COMPLETED | OUTPATIENT
Start: 2020-10-31 | End: 2020-10-31

## 2020-10-31 RX ORDER — HYDROCODONE BITARTRATE AND ACETAMINOPHEN 5; 325 MG/1; MG/1
1 TABLET ORAL EVERY 6 HOURS PRN
Qty: 12 TABLET | Refills: 0 | Status: SHIPPED | OUTPATIENT
Start: 2020-10-31 | End: 2020-11-10

## 2020-10-31 RX ADMIN — MORPHINE SULFATE 7 MG: 2 INJECTION, SOLUTION INTRAMUSCULAR; INTRAVENOUS at 12:10

## 2020-10-31 NOTE — ED PROVIDER NOTES
"Encounter Date: 10/31/2020    SCRIBE #1 NOTE: I, Florian Andrew, am scribing for, and in the presence of, Dr. Greenfield.       History     Chief Complaint   Patient presents with    Fall     2 days ago    Hip Pain     rt. side      Time seen by provider: 11:58 AM on 10/31/2020      Jones Germain is a 60 y.o. male with a PMHx of HIV, hypercholesterolemia, and Hepatitis A who presents to the ED for right sided hip pain that started 2 days ago status post fall. Patient reports that he "Pinched" his sciatic nerve "weeks ago" and he has had pain ambulating since. He states that he tripped and fell 2 day ago and landed on his right hip. He states that he has been having more pain with ambulating in the right hip. Patient states that this pain is more of a sharp pain in the right hip that radiates down the right leg behind his thigh and calf. Patient denies back pain, numbness, weakness, joint swelling, rash, or any other complaint at this time. The patient has a PSHx of transrectal biopsy of prostate and lithotripsy.       The history is provided by the patient.     Review of patient's allergies indicates:  No Known Allergies  Past Medical History:   Diagnosis Date    Adverse effect of hepatitis B vaccine     non responder    Hepatitis A 1991    High frequency hearing loss     HIV (human immunodeficiency virus infection) 1995    On COM/cRix until kidney stones, sallie 200    Hypercholesterolemia     Hyperlipidemia     Kidney stone 2005    [facility], Lithotripsy    Lipoatrophy     Mild    Pertussis     As a child    Prostate cancer     Secondary syphilis 2002    Smoker     Vertigo 02/2018    Due to Ear Infection     Past Surgical History:   Procedure Laterality Date    LITHOTRIPSY      ROBOT-ASSISTED LAPAROSCOPIC PROSTATECTOMY N/A 5/29/2019    Procedure: ROBOTIC PROSTATECTOMY;  Surgeon: Baljinder Johnson MD;  [facility];  Service: Urology;  Laterality: N/A;    TRANSRECTAL BIOPSY OF PROSTATE WITH ULTRASOUND " GUIDANCE N/A 2018    Procedure: BIOPSY, PROSTATE, RECTAL APPROACH, WITH US GUIDANCE;  Surgeon: Baljinder Johnson MD;  Location: [facility] OR;  Service: Urology;  Laterality: N/A;     Family History   Problem Relation Age of Onset    Diabetes Mother          in a MVC    COPD Father     Lung cancer Paternal Grandmother      Social History     Tobacco Use    Smoking status: Current Every Day Smoker     Packs/day: 1.00     Types: Cigarettes    Smokeless tobacco: Never Used    Tobacco comment: 20 to 30   Substance Use Topics    Alcohol use: No     Frequency: Never    Drug use: No     Review of Systems   Constitutional: Negative for fever.   Respiratory: Negative for shortness of breath.    Genitourinary: Negative for flank pain.   Musculoskeletal: Positive for arthralgias. Negative for back pain, gait problem and neck pain.   Neurological: Negative for weakness and numbness.   Psychiatric/Behavioral: Negative for confusion.       Physical Exam     Initial Vitals [10/31/20 1137]   BP Pulse Resp Temp SpO2   (!) 164/78 109 18 98 °F (36.7 °C) 98 %      MAP       --         Physical Exam    Nursing note and vitals reviewed.  Constitutional: He appears well-developed and well-nourished.   HENT:   Head: Normocephalic and atraumatic.   Eyes: Conjunctivae are normal.   Neck: Normal range of motion. Neck supple.   Cardiovascular: Normal rate, regular rhythm and normal heart sounds. Exam reveals no gallop and no friction rub.    No murmur heard.  Pulmonary/Chest: Breath sounds normal. No respiratory distress. He has no wheezes. He has no rhonchi. He has no rales.   Abdominal: Soft. He exhibits no distension. There is no abdominal tenderness.   Musculoskeletal: Normal range of motion.      Comments: No right hip tenderness   Neurological: He is alert and oriented to person, place, and time.   Skin: Skin is warm and dry.   Psychiatric: He has a normal mood and affect.         ED Course   Procedures  Labs Reviewed - No  data to display       Imaging Results          X-Ray Lumbar Spine Ap And Lateral (Final result)  Result time 10/31/20 13:00:20    Final result by Perry Gong MD (10/31/20 13:00:20)                 Impression:      Mild L5-S1 degenerative disc change.    Left nephrolithiasis.      Electronically signed by: Perry Gong MD  Date:    10/31/2020  Time:    13:00             Narrative:    EXAMINATION:  XR LUMBAR SPINE AP AND LATERAL    CLINICAL HISTORY:  Back pain or radiculopathy, < 6 wks, uncomplicated;    TECHNIQUE:  AP, lateral and spot images were performed of the lumbar spine.    COMPARISON:  None    FINDINGS:  There is no fracture or malalignment.  There is mild loss of disc height at L5-S1.  Multiple left renal stones are present.                               X-Ray Hip 2 View Right (Final result)  Result time 10/31/20 12:27:59    Final result by Perry Gong MD (10/31/20 12:27:59)                 Impression:      .  No acute osseous abnormality.      Electronically signed by: Perry Gong MD  Date:    10/31/2020  Time:    12:27             Narrative:    EXAMINATION:  XR HIP 2 VIEW RIGHT    CLINICAL HISTORY:  Unspecified fall, initial encounter    TECHNIQUE:  AP view of the pelvis and frog leg lateral view of the right hip were performed.    COMPARISON:  None    FINDINGS:  There is no fracture or dislocation.  No significant degenerative change                                 Medical Decision Making:   History:   Old Medical Records: I decided to obtain old medical records.  Clinical Tests:   Lab Tests: Ordered and Reviewed  ED Management:  60-year-old male presents with right hip pain after a fall.  He also reports that the hip pain was preceded by right posterior leg pain.  The symptoms are strongly suggestive of sciatica.  Lumbar x-rays independently interpreted by me demonstrate a narrowing of the L5-S1 disc space.  Right hip x-rays independently interpreted by me failed to  demonstrate any evidence of fracture.       APC / Resident Notes:   I, Dr. Meir Greenfield III, personally performed the services described in this documentation. All medical record entries made by the scribe were at my direction and in my presence.  I have reviewed the chart and agree that the record reflects my personal performance and is accurate and complete       Scribe Attestation:   Scribe #1: I performed the above scribed service and the documentation accurately describes the services I performed. I attest to the accuracy of the note.                      Clinical Impression:     ICD-10-CM ICD-9-CM   1. Contusion of right hip, initial encounter  S70.01XA 924.01   2. Fall  W19.XXXA E888.9   3. Sciatica of right side  M54.31 724.3                      Disposition:   Disposition: Discharged  Condition: Stable     ED Disposition Condition    Discharge Stable        ED Prescriptions     Medication Sig Dispense Start Date End Date Auth. Provider    HYDROcodone-acetaminophen (NORCO) 5-325 mg per tablet Take 1 tablet by mouth every 6 (six) hours as needed. 12 tablet 10/31/2020 11/10/2020 Meir Greenfield III, MD        Follow-up Information     Follow up With Specialties Details Why Contact Info    Ninfa Ulloa MD Internal Medicine In 1 week  [address] [facility] [address]  [phone]                                         Meir Greenfield III, MD  10/31/20 [id number]

## 2020-10-31 NOTE — ED NOTES
DCed 20 g IV from right hand established earlier this visit.  Cather intact; no evidence of infection or infiltration.

## 2020-11-08 NOTE — PROGRESS NOTES
SUBJECTIVE:    Patient ID: Jones Germain is a 60 y.o. male.    Chief Complaint: Sciatica and Follow-up    HPI     Follow-up sciatica-he was having pain but he fell again, and went to ER for same, and is having pain now in the distal right posterior thigh and posterior right calf and his feet are swelling-he describes tightness in these area-the balls of his feet were very painful two weeks ago-slight amount of pain in the high lateral right back-8/10 pain in the post thigh and posterior calf-tingling started about a week ago-    Patient has an issue with schedule re injection-    He says he felt very much better after the steroid injection    Office Visit on 07/13/2020   Component Date Value Ref Range Status    RBC, UA 07/13/2020 1  0 - 4 /hpf Final    WBC, UA 07/13/2020 1  0 - 5 /hpf Final    Bacteria 07/13/2020 Few* None-Occ /hpf Final    Ca Oxalate Awilda, UA 07/13/2020 Few  None-Moderate Final    Amorphous, UA 07/13/2020 Moderate  None-Moderate Final    Microscopic Comment 07/13/2020 SEE COMMENT   Final   Lab Visit on 07/10/2020   Component Date Value Ref Range Status    PSA Diagnostic 07/10/2020 <0.01  0.00 - 4.00 ng/mL Final   Office Visit on 06/15/2020   Component Date Value Ref Range Status    Antibody SARS CoV-2 06/19/2020 Negative  Negative Final    WBC 06/19/2020 5.1  3.4 - 10.8 x10E3/uL Final    RBC 06/19/2020 5.25  4.14 - 5.80 x10E6/uL Final    Hemoglobin 06/19/2020 16.9  13.0 - 17.7 g/dL Final    Hematocrit 06/19/2020 50.2  37.5 - 51.0 % Final    MCV 06/19/2020 96  79 - 97 fL Final    MCH 06/19/2020 32.2  26.6 - 33.0 pg Final    MCHC 06/19/2020 33.7  31.5 - 35.7 g/dL Final    RDW 06/19/2020 12.6  11.6 - 15.4 % Final    Platelets 06/19/2020 255  150 - 450 x10E3/uL Final    Neutrophils 06/19/2020 60  Not Estab. % Final    Lymph % 06/19/2020 29  Not Estab. % Final    Mono % 06/19/2020 8  Not Estab. % Final    Eosinophil % 06/19/2020 2  Not Estab. % Final    Basophil % 06/19/2020 0   Not Estab. % Final    Neutrophils Absolute 06/19/2020 3.0  1.4 - 7.0 x10E3/uL Final    Lymph # 06/19/2020 1.5  0.7 - 3.1 x10E3/uL Final    Mono # 06/19/2020 0.4  0.1 - 0.9 x10E3/uL Final    Eos # 06/19/2020 0.1  0.0 - 0.4 x10E3/uL Final    Baso # 06/19/2020 0.0  0.0 - 0.2 x10E3/uL Final    Immature Granulocytes 06/19/2020 1  Not Estab. % Final    Immature Grans (Abs) 06/19/2020 0.0  0.0 - 0.1 x10E3/uL Final    Glucose 06/19/2020 92  65 - 99 mg/dL Final    BUN 06/19/2020 8  8 - 27 mg/dL Final    Creatinine 06/19/2020 0.88  0.76 - 1.27 mg/dL Final    eGFR if non African American 06/19/2020 93  >59 mL/min/1.73 Final    eGFR if African American 06/19/2020 107  >59 mL/min/1.73 Final    BUN/Creatinine Ratio 06/19/2020 9* 10 - 24 Final    Sodium 06/19/2020 140  134 - 144 mmol/L Final    Potassium 06/19/2020 4.5  3.5 - 5.2 mmol/L Final    Chloride 06/19/2020 100  96 - 106 mmol/L Final    CO2 06/19/2020 22  20 - 29 mmol/L Final    Calcium 06/19/2020 9.2  8.6 - 10.2 mg/dL Final    Total Protein 06/19/2020 7.1  6.0 - 8.5 g/dL Final    Albumin 06/19/2020 4.8  3.8 - 4.8 g/dL Final    Globulin, Total 06/19/2020 2.3  1.5 - 4.5 g/dL Final    Albumin/Globulin Ratio 06/19/2020 2.1  1.2 - 2.2 Final    Total Bilirubin 06/19/2020 0.4  0.0 - 1.2 mg/dL Final    Alkaline Phosphatase 06/19/2020 92  39 - 117 IU/L Final    AST 06/19/2020 18  0 - 40 IU/L Final    ALT 06/19/2020 20  0 - 44 IU/L Final    Cholesterol 06/19/2020 176  100 - 199 mg/dL Final    Triglycerides 06/19/2020 257* 0 - 149 mg/dL Final    HDL 06/19/2020 29* >39 mg/dL Final    VLDL Cholesterol Reid 06/19/2020 51* 5 - 40 mg/dL Final    LDL Calculated 06/19/2020 96  0 - 99 mg/dL Final    HIV-1 RNA by PCR, Qn 06/19/2020 <20  copies/mL Final    log10 HIV-1 RNA 06/19/2020 CANCELED  [id number]/mL Final-Edited    Hemoglobin A1C 06/19/2020 5.1  4.8 - 5.6 % Final    Quantiferon TB Gold (Incubated) 06/19/2020 Incubation performed.   Final    QuantiFERON  Criteria 2020 Comment   Final    QuantiFERON TB1 Ag Value 2020 0.04  IU/mL Final    QuantiFERON TB2 Ag Value 2020 0.05  IU/mL Final    Quantiferon Nil Value 2020 0.03  IU/mL Final    Quantiferon Mitogen Value 2020 >10.00  IU/mL Final    QuantiFERON-TB Gold Plus 2020 Negative  Negative Final       Past Medical History:   Diagnosis Date    Adverse effect of hepatitis B vaccine     non responder    Hepatitis A     High frequency hearing loss     HIV (human immunodeficiency virus infection)     On COM/cRix until kidney stones, sallie 200    Hypercholesterolemia     Hyperlipidemia     Kidney stone     [facility], Lithotripsy    Lipoatrophy     Mild    Pertussis     As a child    Prostate cancer     Secondary syphilis     Smoker     Vertigo 2018    Due to Ear Infection     Past Surgical History:   Procedure Laterality Date    LITHOTRIPSY      ROBOT-ASSISTED LAPAROSCOPIC PROSTATECTOMY N/A 2019    Procedure: ROBOTIC PROSTATECTOMY;  Surgeon: Baljinder Johnson MD;  Location: [facility] OR;  Service: Urology;  Laterality: N/A;    TRANSRECTAL BIOPSY OF PROSTATE WITH ULTRASOUND GUIDANCE N/A 2018    Procedure: BIOPSY, PROSTATE, RECTAL APPROACH, WITH US GUIDANCE;  Surgeon: Baljinder Johnson MD;  Location: [facility] OR;  Service: Urology;  Laterality: N/A;     Family History   Problem Relation Age of Onset    Diabetes Mother          in a MVC    COPD Father     Lung cancer Paternal Grandmother        Marital Status: Single  Alcohol History:  reports no history of alcohol use.  Tobacco History:  reports that he has been smoking cigarettes. He has been smoking about 1.00 pack per day. He has never used smokeless tobacco.  Drug History:  reports no history of drug use.    Review of patient's allergies indicates:  No Known Allergies    Current Outpatient Medications:     aspirin 81 MG Chew, Take 81 mg by mouth once daily. , Disp: , Rfl:     cyclobenzaprine  "(FLEXERIL) 10 MG tablet, One po in am and afternoon and two at HS, Disp: 90 tablet, Rfl: 0    efavirenz (SUSTIVA) 600 mg Tab, Take 1 tablet (600 mg total) by mouth every evening., Disp: 90 tablet, Rfl: 1    emtricitabine-tenofovir alafen (DESCOVY) 200-25 mg Tab, Take 1 tablet by mouth once daily., Disp: 90 tablet, Rfl: 2    escitalopram oxalate (LEXAPRO) 10 MG tablet, Take 1 tablet (10 mg total) by mouth once daily., Disp: 90 tablet, Rfl: 3    gabapentin (NEURONTIN) 300 MG capsule, Take 1 capsule (300 mg total) by mouth 3 (three) times daily., Disp: 60 capsule, Rfl: 1    nitroGLYCERIN (NITROSTAT) 0.4 MG SL tablet, If you have chest pain, place 1 tablet under tongue every 5 min as needed for 3 doses.  Then call your doctor afterwards, Disp: 15 tablet, Rfl: 0    pravastatin (PRAVACHOL) 40 MG tablet, Take 1 tablet (40 mg total) by mouth once daily., Disp: 90 tablet, Rfl: 2    Review of Systems   Constitutional: Negative for activity change and unexpected weight change.   HENT: Negative for hearing loss, rhinorrhea and trouble swallowing.    Eyes: Negative for discharge and visual disturbance.   Respiratory: Negative for chest tightness and wheezing.    Cardiovascular: Negative for chest pain and palpitations.   Gastrointestinal: Negative for blood in stool, constipation, diarrhea and vomiting.   Endocrine: Negative for polydipsia and polyuria.   Genitourinary: Negative for difficulty urinating, hematuria and urgency.   Musculoskeletal: Negative for arthralgias, joint swelling and neck pain.   Neurological: Positive for weakness. Negative for headaches.   Psychiatric/Behavioral: Negative for confusion and dysphoric mood.          Objective:      Vitals:    11/11/20 1551   BP: 124/78   Pulse: 92   Resp: 16   Temp: 97.5 °F (36.4 °C)   SpO2: 98%   Weight: 69.4 kg (153 lb)   Height: 5' 6" (1.676 m)     Physical Exam  Vitals signs and nursing note reviewed.   Constitutional:       General: He is in acute distress. "   HENT:      Head: Normocephalic and atraumatic.   Eyes:      General: No scleral icterus.        Right eye: No discharge.         Left eye: No discharge.      Conjunctiva/sclera: Conjunctivae normal.      Pupils: Pupils are equal, round, and reactive to light.   Neck:      Musculoskeletal: Normal range of motion and neck supple.   Cardiovascular:      Pulses: Normal pulses.      Heart sounds: Normal heart sounds.   Musculoskeletal:      Comments: Has to sit eaning on left buttock-rises slowly-postive SLR right   Skin:     General: Skin is warm and dry.   Neurological:      Mental Status: He is alert.   Psychiatric:      Comments: irritable           Assessment:       1. Back muscle spasm    2. Sciatica of right side         Plan:       Back muscle spasm    Sciatica of right side      No follow-ups on file.        11/11/2020 Ninfa Ulloa M.D.

## 2020-11-11 ENCOUNTER — OFFICE VISIT (OUTPATIENT)
Dept: FAMILY MEDICINE | Facility: CLINIC | Age: 60
End: 2020-11-11
Payer: COMMERCIAL

## 2020-11-11 VITALS
RESPIRATION RATE: 16 BRPM | HEART RATE: 92 BPM | BODY MASS INDEX: 24.59 KG/M2 | HEIGHT: 66 IN | WEIGHT: 153 LBS | SYSTOLIC BLOOD PRESSURE: 124 MMHG | TEMPERATURE: 98 F | DIASTOLIC BLOOD PRESSURE: 78 MMHG | OXYGEN SATURATION: 98 %

## 2020-11-11 DIAGNOSIS — M62.830 BACK MUSCLE SPASM: ICD-10-CM

## 2020-11-11 DIAGNOSIS — M54.31 SCIATICA OF RIGHT SIDE: ICD-10-CM

## 2020-11-11 PROCEDURE — 96372 PR INJECTION,THERAP/PROPH/DIAG2ST, IM OR SUBCUT: ICD-10-PCS | Mod: S$GLB,,, | Performed by: INTERNAL MEDICINE

## 2020-11-11 PROCEDURE — 3008F PR BODY MASS INDEX (BMI) DOCUMENTED: ICD-10-PCS | Mod: S$GLB,,, | Performed by: INTERNAL MEDICINE

## 2020-11-11 PROCEDURE — 99213 PR OFFICE/OUTPT VISIT, EST, LEVL III, 20-29 MIN: ICD-10-PCS | Mod: 25,S$GLB,, | Performed by: INTERNAL MEDICINE

## 2020-11-11 PROCEDURE — 1125F AMNT PAIN NOTED PAIN PRSNT: CPT | Mod: S$GLB,,, | Performed by: INTERNAL MEDICINE

## 2020-11-11 PROCEDURE — 3008F BODY MASS INDEX DOCD: CPT | Mod: S$GLB,,, | Performed by: INTERNAL MEDICINE

## 2020-11-11 PROCEDURE — 1125F PR PAIN SEVERITY QUANTIFIED, PAIN PRESENT: ICD-10-PCS | Mod: S$GLB,,, | Performed by: INTERNAL MEDICINE

## 2020-11-11 PROCEDURE — 99213 OFFICE O/P EST LOW 20 MIN: CPT | Mod: 25,S$GLB,, | Performed by: INTERNAL MEDICINE

## 2020-11-11 PROCEDURE — 96372 THER/PROPH/DIAG INJ SC/IM: CPT | Mod: S$GLB,,, | Performed by: INTERNAL MEDICINE

## 2020-11-11 RX ORDER — GABAPENTIN 300 MG/1
300 CAPSULE ORAL 3 TIMES DAILY
Qty: 90 CAPSULE | Refills: 0 | Status: SHIPPED | OUTPATIENT
Start: 2020-11-11 | End: 2020-11-14

## 2020-11-11 RX ORDER — NAPROXEN SODIUM 220 MG/1
81 TABLET, FILM COATED ORAL DAILY
Qty: 30 TABLET | Refills: 1 | Status: SHIPPED | OUTPATIENT
Start: 2020-11-11

## 2020-11-11 RX ORDER — CYCLOBENZAPRINE HCL 10 MG
TABLET ORAL
Qty: 90 TABLET | Refills: 0 | Status: SHIPPED | OUTPATIENT
Start: 2020-11-11 | End: 2021-06-28

## 2020-11-11 RX ORDER — METHYLPREDNISOLONE ACETATE 80 MG/ML
80 INJECTION, SUSPENSION INTRA-ARTICULAR; INTRALESIONAL; INTRAMUSCULAR; SOFT TISSUE ONCE
Status: COMPLETED | OUTPATIENT
Start: 2020-11-11 | End: 2020-11-11

## 2020-11-11 RX ORDER — MELOXICAM 15 MG/1
15 TABLET ORAL DAILY
Qty: 30 TABLET | Refills: 0 | Status: SHIPPED | OUTPATIENT
Start: 2020-11-11 | End: 2020-12-14 | Stop reason: SDUPTHER

## 2020-11-11 RX ADMIN — METHYLPREDNISOLONE ACETATE 80 MG: 80 INJECTION, SUSPENSION INTRA-ARTICULAR; INTRALESIONAL; INTRAMUSCULAR; SOFT TISSUE at 04:11

## 2020-11-24 DIAGNOSIS — B20 HIV INFECTION, UNSPECIFIED SYMPTOM STATUS: Chronic | ICD-10-CM

## 2020-11-24 RX ORDER — EMTRICITABINE AND TENOFOVIR ALAFENAMIDE 200; 25 MG/1; MG/1
1 TABLET ORAL DAILY
Qty: 90 TABLET | Refills: 2 | Status: SHIPPED | OUTPATIENT
Start: 2020-11-24 | End: 2022-05-16 | Stop reason: SDUPTHER

## 2020-12-14 DIAGNOSIS — M62.830 BACK MUSCLE SPASM: ICD-10-CM

## 2020-12-14 DIAGNOSIS — M54.31 SCIATICA OF RIGHT SIDE: ICD-10-CM

## 2020-12-14 RX ORDER — GABAPENTIN 300 MG/1
300 CAPSULE ORAL 3 TIMES DAILY
Qty: 90 CAPSULE | Refills: 2 | Status: SHIPPED | OUTPATIENT
Start: 2020-12-14 | End: 2020-12-30 | Stop reason: SDUPTHER

## 2020-12-14 RX ORDER — MELOXICAM 15 MG/1
15 TABLET ORAL DAILY
Qty: 30 TABLET | Refills: 2 | Status: SHIPPED | OUTPATIENT
Start: 2020-12-14 | End: 2020-12-30 | Stop reason: SDUPTHER

## 2020-12-17 ENCOUNTER — IMMUNIZATION (OUTPATIENT)
Dept: PHARMACY | Facility: CLINIC | Age: 60
End: 2020-12-17
Payer: COMMERCIAL

## 2020-12-30 ENCOUNTER — OFFICE VISIT (OUTPATIENT)
Dept: INFECTIOUS DISEASES | Facility: CLINIC | Age: 60
End: 2020-12-30
Payer: COMMERCIAL

## 2020-12-30 VITALS
DIASTOLIC BLOOD PRESSURE: 111 MMHG | BODY MASS INDEX: 25.2 KG/M2 | HEART RATE: 108 BPM | SYSTOLIC BLOOD PRESSURE: 158 MMHG | TEMPERATURE: 98 F | HEIGHT: 66 IN | OXYGEN SATURATION: 97 % | WEIGHT: 156.81 LBS

## 2020-12-30 DIAGNOSIS — M54.31 SCIATICA OF RIGHT SIDE: ICD-10-CM

## 2020-12-30 DIAGNOSIS — E88.1 LIPODYSTROPHY DUE TO HIV INFECTION AND ANTIRETROVIRAL THERAPY: ICD-10-CM

## 2020-12-30 DIAGNOSIS — M85.88 OSTEOPENIA OF LUMBAR SPINE: ICD-10-CM

## 2020-12-30 DIAGNOSIS — E78.00 HIGH CHOLESTEROL: ICD-10-CM

## 2020-12-30 DIAGNOSIS — Z79.899 LIPODYSTROPHY DUE TO HIV INFECTION AND ANTIRETROVIRAL THERAPY: ICD-10-CM

## 2020-12-30 DIAGNOSIS — B20 HUMAN IMMUNODEFICIENCY VIRUS (HIV) DISEASE: Primary | ICD-10-CM

## 2020-12-30 DIAGNOSIS — F17.200 SMOKER: ICD-10-CM

## 2020-12-30 DIAGNOSIS — B20 LIPODYSTROPHY DUE TO HIV INFECTION AND ANTIRETROVIRAL THERAPY: ICD-10-CM

## 2020-12-30 DIAGNOSIS — Z71.89 EDUCATED ABOUT COVID-19 VIRUS INFECTION: ICD-10-CM

## 2020-12-30 DIAGNOSIS — M62.830 BACK MUSCLE SPASM: ICD-10-CM

## 2020-12-30 DIAGNOSIS — I77.819 AORTIC ECTASIA: ICD-10-CM

## 2020-12-30 DIAGNOSIS — R73.9 HYPERGLYCEMIA: ICD-10-CM

## 2020-12-30 PROCEDURE — 99214 PR OFFICE/OUTPT VISIT, EST, LEVL IV, 30-39 MIN: ICD-10-PCS | Mod: S$GLB,,, | Performed by: INTERNAL MEDICINE

## 2020-12-30 PROCEDURE — 1125F PR PAIN SEVERITY QUANTIFIED, PAIN PRESENT: ICD-10-PCS | Mod: S$GLB,,, | Performed by: INTERNAL MEDICINE

## 2020-12-30 PROCEDURE — 99214 OFFICE O/P EST MOD 30 MIN: CPT | Mod: S$GLB,,, | Performed by: INTERNAL MEDICINE

## 2020-12-30 PROCEDURE — 1125F AMNT PAIN NOTED PAIN PRSNT: CPT | Mod: S$GLB,,, | Performed by: INTERNAL MEDICINE

## 2020-12-30 RX ORDER — MELOXICAM 15 MG/1
15 TABLET ORAL DAILY
Qty: 30 TABLET | Refills: 2 | Status: SHIPPED | OUTPATIENT
Start: 2020-12-30 | End: 2021-06-28

## 2020-12-30 RX ORDER — GABAPENTIN 300 MG/1
300 CAPSULE ORAL 3 TIMES DAILY
Qty: 90 CAPSULE | Refills: 2 | Status: SHIPPED | OUTPATIENT
Start: 2020-12-30 | End: 2021-01-01

## 2020-12-30 NOTE — PATIENT INSTRUCTIONS
meloxicam and gabapentin were sent to your pharmacy    Same medications  Menactra(meningitis vaccine) prescription to receive at your pharmacy    Please make an appointment with cardiologist (Dr. Powers) regarding your aorta    Go for lab

## 2020-12-30 NOTE — PROGRESS NOTES
Subjective:       Patient ID: Jones Germain is a 60 y.o. male.    Chief Complaint:: HIV Positive/AIDS    Follow-up    HIV Positive/AIDS    Discussed prostate cancer diagnosis and options, admits depression. Concerned about requiring catheter, being incontinent, radiation adverse effects. He is planning to do the surgery before summer break so that he will have time to recover.  Did receive his flu vaccine. Converted to generic atripla  Compliant with meds. Still smoking. Has not yet established with primary care. Discussed that he will need clearance for surgery and anesthesia    6/27/19: he was seen by Dr. De La Torre for cardiac clearance. On 5/29 he had robotic prostatectomy, received bactrim post op and had jones for 2 weeks. Requiring no pain meds but has sensitivity over his midline abdominal incision. Tells me he will not require any additional treatment. He feels like he is emptying his bladder well. Stream is good. Rare leak. He stopped his Atripla for a week after the surgery. Very frustrated with the cost of his care and very limited in his finances. He isolates himself    12/30/19: reviewed recent hospitalization. He went in for chest pain, had a negative experience, had a stress echo which was negative and CT chest and calcium score. 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 He left AMA for personal reasons. He Has reduced cig to 1/4 ppd. Very stressed from work related issues, cost of medical care. Had a diarrheal illness for 2 weeks after that admission and [facility]. He has lots of anxiety, frustration, anger, depression underlying and prior to these events. He did not feel that the wellbutrin did much good. He is also using a nicotine patch which may be too strong for the 1/4 pack he is smoking now. His BP is high as well as pulse. He states that he measures his blood pressure at home from time to time and its ok. Discussed that HTN can make the aorta more dilated and risk an aneurysm  forming. He did not have viralload in July as requested.     6/15/20: he is protecting himself from COVID. He is socializing very little.  lexapro has helped him a great deal. He started doing yoga and he enjoys this.  He has not seen cardiologist yet.     12/30/20: since last visit, he had a fall and radiculopathy consistent with sciatica. He did have benefit from gabapentin and meloxicam but ran out. He is concerned about going back to school and COVID risk. He has not yet seen cardiology for follow up on aortic abnormality      Current Outpatient Medications:     aspirin 81 MG Chew, Take 1 tablet (81 mg total) by mouth once daily., Disp: 30 tablet, Rfl: 1    cyclobenzaprine (FLEXERIL) 10 MG tablet, One po in am and afternoon and two at HS, Disp: 90 tablet, Rfl: 0    efavirenz (SUSTIVA) 600 mg Tab, Take 1 tablet (600 mg total) by mouth every evening., Disp: 90 tablet, Rfl: 1    emtricitabine-tenofovir alafen (DESCOVY) 200-25 mg Tab, Take 1 tablet by mouth once daily., Disp: 90 tablet, Rfl: 2    meloxicam (MOBIC) 15 MG tablet, Take 1 tablet (15 mg total) by mouth once daily., Disp: 30 tablet, Rfl: 2    nitroGLYCERIN (NITROSTAT) 0.4 MG SL tablet, If you have chest pain, place 1 tablet under tongue every 5 min as needed for 3 doses.  Then call your doctor afterwards, Disp: 15 tablet, Rfl: 0    escitalopram oxalate (LEXAPRO) 10 MG tablet, Take 1 tablet (10 mg total) by mouth once daily., Disp: 90 tablet, Rfl: 3    gabapentin (NEURONTIN) 300 MG capsule, Take 1 capsule (300 mg total) by mouth 3 (three) times daily., Disp: 90 capsule, Rfl: 2    meningococcal polysaccharide (MENACTRA) 4 mcg/0.5 mL injection, Inject 0.5 mLs into the muscle once. for 1 dose, Disp: 0.5 mL, Rfl: 0    pravastatin (PRAVACHOL) 40 MG tablet, Take 1 tablet (40 mg total) by mouth once daily., Disp: 90 tablet, Rfl: 2  Review of patient's allergies indicates:  No Known Allergies  Past Medical History:   Diagnosis Date    Adverse effect of  hepatitis B vaccine     non responder    Hepatitis A 1991    High frequency hearing loss     HIV (human immunodeficiency virus infection) 1995    On COM/cRix until kidney stones, sallie 200    Hypercholesterolemia     Hyperlipidemia     Kidney stone 2005    [facility], Lithotripsy    Lipoatrophy     Mild    Pertussis     As a child    Prostate cancer     Secondary syphilis 2002    Smoker     Vertigo 02/2018    Due to Ear Infection     Past Surgical History:   Procedure Laterality Date    LITHOTRIPSY      ROBOT-ASSISTED LAPAROSCOPIC PROSTATECTOMY N/A 5/29/2019    Procedure: ROBOTIC PROSTATECTOMY;  Surgeon: Baljinder Johnson MD;  Location: [facility];  Service: Urology;  Laterality: N/A;    TRANSRECTAL BIOPSY OF PROSTATE WITH ULTRASOUND GUIDANCE N/A 9/25/2018    Procedure: BIOPSY, PROSTATE, RECTAL APPROACH, WITH US GUIDANCE;  Surgeon: Baljinder Johnson MD;  Location: [facility] OR;  Service: Urology;  Laterality: N/A;     Social History     Socioeconomic History    Marital status: Single     Spouse name: Not on file    Number of children: Not on file    Years of education: Not on file    Highest education level: Not on file   Occupational History    Occupation: Teacher   Social Needs    Financial resource strain: Not hard at all    Food insecurity     Worry: Never true     Inability: Never true    Transportation needs     Medical: No     Non-medical: No   Tobacco Use    Smoking status: Current Every Day Smoker     Packs/day: 1.00     Types: Cigarettes    Smokeless tobacco: Never Used    Tobacco comment: 20 to 30   Substance and Sexual Activity    Alcohol use: No     Frequency: Never     Binge frequency: Never    Drug use: No    Sexual activity: Never     Comment: Abstinent   Lifestyle    Physical activity     Days per week: 0 days     Minutes per session: 0 min    Stress: To some extent   Relationships    Social connections     Talks on phone: Twice a week     Gets together: Once a week     Attends  "Taoist service: Not on file     Active member of club or organization: No     Attends meetings of clubs or organizations: Never     Relationship status: Never    Other Topics Concern    Not on file   Social History Narrative    Not on file     Family History   Problem Relation Age of Onset    Diabetes Mother          in a MVC    COPD Father     Lung cancer Paternal Grandmother        Travel History:   Vaccine History: Yearly flu vaccine, Pneumovax , , Prevnar , tetanus 2005, Td AP , Zostavax 2016, hepatitis B ×3 , shingrix x 2  Advanced Directive:   Safer Sex: Abstinent  Bone Density: 2011 osteopenia  Colonoscopy:     Review of Systems    Constitutional: No fever, chills, sweats, fatigue, weakness,    Unable to do Yoga for exercise at this time    Eyes: No change in vision, loss of vision,  .     ENT: No sinus drainage, sore throat, mouth pain, or lesions.     Cardiovascular: No chest pain, GAVIN, palpitations or pedal edema    Respiratory: No shortness of breath, GAVIN, cough, wheeze, sputum, pleurisy, or hemoptysis.  Up to smoking  1  pack per day again   again  Gastrointestinal: No abdominal pain, nausea, vomiting, diarrhea,  or focal abd pain    Genitourinary: No dysuria, hematuria, incontinence, frequency, flank pain,  And feels back to normal urologically except for ED    Musculoskeletal: see HPI    Integumentary: No new rashes, lesions,   Neurological: No dizziness, vertigo, unusual headaches,     Psychiatric: he is pleased with lexapro    Endocrine: No diabetes Thyroid normal    Lymphatic: No lymphadenopathy, blood loss, anemia,     Objective:      Blood pressure (!) 158/111, pulse 108, temperature 98.1 °F (36.7 °C), temperature source Temporal, height 5' 6" (1.676 m), weight 71.1 kg (156 lb 12.8 oz), SpO2 97 %. Body mass index is 25.31 kg/m².  Physical Exam   Repeat was 126/80    General: Alert and attentive, cooperative  .    Eyes: Pupils equal, round, " reactive to light, anicteric, EOMI    Neck: Supple, non-tender, no thyromegaly or masses    ENT: EAC patent, TM normal, nares patent, no oral lesions, teeth in good condition, no thrush    Cardiovascular: Regular rate and rhythm, no murmurs, rubs, or gallop    Respiratory: Lungs clear without wheezes, no rales, rub or rhonci    Gastrointestinal: Active bowel sounds, soft, no mass or organomegaly, no tenderness or distention    Genitourinary: No flank tenderness    Vascular: No peripheral edema, phlebitis, pulses normal. Warm and well perfused    Musculoskeletal: Ambulates without difficulty , no acute arthritis, synovitis or myositis. Normal muscle bulk and strength though SLR is uncomfortable on right    Integumentary: Skin without rashes, lesions,     AnusRectum: per urology    Neurological: Normal LOC, cranial nerves, speech, reflexes, normal gait    Psychiatric: usual demeanor    Lymphatic: No cervical, supraclavicular, axillary, or inguinal lymphadenopathy      Wound:      HIV Table:   HLA-B 5701     TOXOIgG     RPR 12/2018 non reactive   HEP A IgG 6/8/2015 infection/immune   HBsAg 6/8/2015 negative   HBsAb 12/3/2016 negative, non responder   HBcAb     HBeAb     HBeAg     HCVAb 7/8/2018  negative   HBV DNA     HCV RNA     Vitamin D 1/20/2018 40   Chlamydia / GC 12/5/2014 negative   TB Gold  12/2018  negative  PSA   7/2020  undetectable  HIV RNA  6/2020  <20  COVID IgG   6/2020  negative    Recent Diagnostics: Reviewed July lab       Assessment and Plan:           Human immunodeficiency virus (HIV) disease  -     CBC Auto Differential; Future; Expected date: 12/30/2020  -     Comprehensive Metabolic Panel; Future; Expected date: 12/30/2020  -     Lipid Panel; Future; Expected date: 12/30/2020  -     HIV RNA, Quantitative, PCR; Future; Expected date: 12/30/2020  -     DXA Bone Density Spine And Hip; Future    Sciatica of right side  -     meloxicam (MOBIC) 15 MG tablet; Take 1 tablet (15 mg total) by mouth once  daily.  Dispense: 30 tablet; Refill: 2  -     gabapentin (NEURONTIN) 300 MG capsule; Take 1 capsule (300 mg total) by mouth 3 (three) times daily.  Dispense: 90 capsule; Refill: 2    Back muscle spasm  -     meloxicam (MOBIC) 15 MG tablet; Take 1 tablet (15 mg total) by mouth once daily.  Dispense: 30 tablet; Refill: 2  -     gabapentin (NEURONTIN) 300 MG capsule; Take 1 capsule (300 mg total) by mouth 3 (three) times daily.  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 for 1 dose  Dispense: 0.5 mL; Refill: 0         This note was created using Dragon voice recognition software that occasionally misinterpreted phrases or words.    meloxicam and gabapentin were sent to your pharmacy    Same medications  Menactra(meningitis vaccine) prescription to receive at your pharmacy    Please make an appointment with cardiologist (Dr. Powers) regarding your aorta    Go for lab

## 2021-01-05 LAB
ALBUMIN SERPL-MCNC: 4.9 G/DL (ref 3.8–4.8)
ALBUMIN/GLOB SERPL: 2.2 {RATIO} (ref 1.2–2.2)
ALP SERPL-CCNC: 105 IU/L (ref 39–117)
ALT SERPL-CCNC: 24 IU/L (ref 0–44)
AST SERPL-CCNC: 17 IU/L (ref 0–40)
BASOPHILS # BLD AUTO: 0 X10E3/UL (ref 0–0.2)
BASOPHILS NFR BLD AUTO: 0 %
BILIRUB SERPL-MCNC: 0.3 MG/DL (ref 0–1.2)
BUN SERPL-MCNC: 10 MG/DL (ref 8–27)
BUN/CREAT SERPL: 13 (ref 10–24)
CALCIUM SERPL-MCNC: 9.7 MG/DL (ref 8.6–10.2)
CHLORIDE SERPL-SCNC: 99 MMOL/L (ref 96–106)
CHOLEST SERPL-MCNC: 213 MG/DL (ref 100–199)
CO2 SERPL-SCNC: 22 MMOL/L (ref 20–29)
CREAT SERPL-MCNC: 0.79 MG/DL (ref 0.76–1.27)
EOSINOPHIL # BLD AUTO: 0.1 X10E3/UL (ref 0–0.4)
EOSINOPHIL NFR BLD AUTO: 1 %
ERYTHROCYTE [DISTWIDTH] IN BLOOD BY AUTOMATED COUNT: 12.7 % (ref 11.6–15.4)
GLOBULIN SER CALC-MCNC: 2.2 G/DL (ref 1.5–4.5)
GLUCOSE SERPL-MCNC: 98 MG/DL (ref 65–99)
HBA1C MFR BLD: 5.1 % (ref 4.8–5.6)
HCT VFR BLD AUTO: 48.5 % (ref 37.5–51)
HDLC SERPL-MCNC: 37 MG/DL
HGB BLD-MCNC: 16.3 G/DL (ref 13–17.7)
HIV1 RNA # SERPL NAA+PROBE: <20 COPIES/ML
HIV1 RNA SERPL NAA+PROBE-LOG#: NORMAL LOG10COPY/ML
IMM GRANULOCYTES # BLD AUTO: 0 X10E3/UL (ref 0–0.1)
IMM GRANULOCYTES NFR BLD AUTO: 0 %
LDLC SERPL CALC-MCNC: 125 MG/DL (ref 0–99)
LYMPHOCYTES # BLD AUTO: 1.4 X10E3/UL (ref 0.7–3.1)
LYMPHOCYTES NFR BLD AUTO: 12 %
MCH RBC QN AUTO: 32.6 PG (ref 26.6–33)
MCHC RBC AUTO-ENTMCNC: 33.6 G/DL (ref 31.5–35.7)
MCV RBC AUTO: 97 FL (ref 79–97)
MONOCYTES # BLD AUTO: 0.7 X10E3/UL (ref 0.1–0.9)
MONOCYTES NFR BLD AUTO: 6 %
NEUTROPHILS # BLD AUTO: 9 X10E3/UL (ref 1.4–7)
NEUTROPHILS NFR BLD AUTO: 81 %
PLATELET # BLD AUTO: 276 X10E3/UL (ref 150–450)
POTASSIUM SERPL-SCNC: 4.6 MMOL/L (ref 3.5–5.2)
PROT SERPL-MCNC: 7.1 G/DL (ref 6–8.5)
RBC # BLD AUTO: 5 X10E6/UL (ref 4.14–5.8)
SODIUM SERPL-SCNC: 139 MMOL/L (ref 134–144)
TRIGL SERPL-MCNC: 285 MG/DL (ref 0–149)
VLDLC SERPL CALC-MCNC: 51 MG/DL (ref 5–40)
WBC # BLD AUTO: 11.2 X10E3/UL (ref 3.4–10.8)

## 2021-01-15 ENCOUNTER — HOSPITAL ENCOUNTER (OUTPATIENT)
Dept: RADIOLOGY | Facility: HOSPITAL | Age: 61
Discharge: HOME | End: 2021-01-15
Attending: UROLOGY
Payer: COMMERCIAL

## 2021-01-15 ENCOUNTER — TELEPHONE (OUTPATIENT)
Dept: INFECTIOUS DISEASES | Facility: CLINIC | Age: 61
End: 2021-01-15

## 2021-01-15 DIAGNOSIS — N20.0 KIDNEY STONES: ICD-10-CM

## 2021-01-15 PROCEDURE — 74018 RADEX ABDOMEN 1 VIEW: CPT | Mod: 26,,, | Performed by: RADIOLOGY

## 2021-01-15 PROCEDURE — 74018 RADEX ABDOMEN 1 VIEW: CPT | Mod: TC,FY

## 2021-01-15 PROCEDURE — 74018 XR ABDOMEN AP 1 VIEW: ICD-10-PCS | Mod: 26,,, | Performed by: RADIOLOGY

## 2021-01-18 ENCOUNTER — TELEPHONE (OUTPATIENT)
Dept: INFECTIOUS DISEASES | Facility: CLINIC | Age: 61
End: 2021-01-18

## 2021-01-21 ENCOUNTER — OFFICE VISIT (OUTPATIENT)
Dept: UROLOGY | Facility: CLINIC | Age: 61
End: 2021-01-21
Payer: COMMERCIAL

## 2021-01-21 ENCOUNTER — APPOINTMENT (OUTPATIENT)
Dept: LAB | Facility: HOSPITAL | Age: 61
End: 2021-01-21
Attending: NURSE PRACTITIONER
Payer: COMMERCIAL

## 2021-01-21 VITALS
HEIGHT: 66 IN | BODY MASS INDEX: 25.08 KG/M2 | DIASTOLIC BLOOD PRESSURE: 90 MMHG | WEIGHT: 156.06 LBS | SYSTOLIC BLOOD PRESSURE: 145 MMHG | HEART RATE: 96 BPM | RESPIRATION RATE: 18 BRPM

## 2021-01-21 DIAGNOSIS — C61 PROSTATE CANCER: Primary | ICD-10-CM

## 2021-01-21 DIAGNOSIS — R31.29 MICROHEMATURIA: ICD-10-CM

## 2021-01-21 DIAGNOSIS — N40.2 PROSTATE NODULE: ICD-10-CM

## 2021-01-21 DIAGNOSIS — N20.0 KIDNEY STONES: ICD-10-CM

## 2021-01-21 LAB
BACTERIA #/AREA URNS HPF: ABNORMAL /HPF
BILIRUB SERPL-MCNC: ABNORMAL MG/DL
BILIRUB UR QL STRIP: NEGATIVE
BLOOD URINE, POC: ABNORMAL
CLARITY UR: CLEAR
CLARITY, POC UA: CLEAR
COLOR UR: YELLOW
COLOR, POC UA: YELLOW
GLUCOSE UR QL STRIP: ABNORMAL
GLUCOSE UR QL STRIP: NEGATIVE
HGB UR QL STRIP: ABNORMAL
KETONES UR QL STRIP: ABNORMAL
KETONES UR QL STRIP: NEGATIVE
LEUKOCYTE ESTERASE UR QL STRIP: NEGATIVE
LEUKOCYTE ESTERASE URINE, POC: ABNORMAL
MICROSCOPIC COMMENT: ABNORMAL
NITRITE UR QL STRIP: NEGATIVE
NITRITE, POC UA: ABNORMAL
PH UR STRIP: 6 [PH] (ref 5–8)
PH, POC UA: 6
PROT UR QL STRIP: ABNORMAL
PROTEIN, POC: 30
RBC #/AREA URNS HPF: 75 /HPF (ref 0–4)
SP GR UR STRIP: >=1.03 (ref 1–1.03)
SPECIFIC GRAVITY, POC UA: 1.03
SQUAMOUS #/AREA URNS HPF: 2 /HPF
URN SPEC COLLECT METH UR: ABNORMAL
UROBILINOGEN UR STRIP-ACNC: NEGATIVE EU/DL
UROBILINOGEN, POC UA: 0.2
WBC #/AREA URNS HPF: 5 /HPF (ref 0–5)

## 2021-01-21 PROCEDURE — 3008F BODY MASS INDEX DOCD: CPT | Mod: CPTII,S$GLB,, | Performed by: NURSE PRACTITIONER

## 2021-01-21 PROCEDURE — 81000 URINALYSIS NONAUTO W/SCOPE: CPT

## 2021-01-21 PROCEDURE — 87086 URINE CULTURE/COLONY COUNT: CPT

## 2021-01-21 PROCEDURE — 1126F AMNT PAIN NOTED NONE PRSNT: CPT | Mod: S$GLB,,, | Performed by: NURSE PRACTITIONER

## 2021-01-21 PROCEDURE — 99214 OFFICE O/P EST MOD 30 MIN: CPT | Mod: 25,S$GLB,, | Performed by: NURSE PRACTITIONER

## 2021-01-21 PROCEDURE — 99999 PR PBB SHADOW E&M-EST. PATIENT-LVL IV: ICD-10-PCS | Mod: PBBFAC,,, | Performed by: NURSE PRACTITIONER

## 2021-01-21 PROCEDURE — 99214 PR OFFICE/OUTPT VISIT, EST, LEVL IV, 30-39 MIN: ICD-10-PCS | Mod: 25,S$GLB,, | Performed by: NURSE PRACTITIONER

## 2021-01-21 PROCEDURE — 1126F PR PAIN SEVERITY QUANTIFIED, NO PAIN PRESENT: ICD-10-PCS | Mod: S$GLB,,, | Performed by: NURSE PRACTITIONER

## 2021-01-21 PROCEDURE — 81002 URINALYSIS NONAUTO W/O SCOPE: CPT | Mod: S$GLB,,, | Performed by: NURSE PRACTITIONER

## 2021-01-21 PROCEDURE — 3008F PR BODY MASS INDEX (BMI) DOCUMENTED: ICD-10-PCS | Mod: CPTII,S$GLB,, | Performed by: NURSE PRACTITIONER

## 2021-01-21 PROCEDURE — 81002 POCT URINE DIPSTICK WITHOUT MICROSCOPE: ICD-10-PCS | Mod: S$GLB,,, | Performed by: NURSE PRACTITIONER

## 2021-01-21 PROCEDURE — 99999 PR PBB SHADOW E&M-EST. PATIENT-LVL IV: CPT | Mod: PBBFAC,,, | Performed by: NURSE PRACTITIONER

## 2021-01-23 LAB — BACTERIA UR CULT: NORMAL

## 2021-02-15 DIAGNOSIS — B20 HUMAN IMMUNODEFICIENCY VIRUS (HIV) DISEASE: ICD-10-CM

## 2021-02-15 DIAGNOSIS — E78.00 HIGH CHOLESTEROL: ICD-10-CM

## 2021-02-15 RX ORDER — PRAVASTATIN SODIUM 40 MG/1
40 TABLET ORAL DAILY
Qty: 90 TABLET | Refills: 2 | Status: SHIPPED | OUTPATIENT
Start: 2021-02-15 | End: 2022-06-27 | Stop reason: SDUPTHER

## 2021-02-15 RX ORDER — ESCITALOPRAM OXALATE 10 MG/1
10 TABLET ORAL DAILY
Qty: 90 TABLET | Refills: 3 | Status: SHIPPED | OUTPATIENT
Start: 2021-02-15 | End: 2022-05-31 | Stop reason: SDUPTHER

## 2021-02-25 ENCOUNTER — IMMUNIZATION (OUTPATIENT)
Dept: PRIMARY CARE CLINIC | Facility: CLINIC | Age: 61
End: 2021-02-25
Payer: COMMERCIAL

## 2021-02-25 DIAGNOSIS — Z23 NEED FOR VACCINATION: Primary | ICD-10-CM

## 2021-02-25 PROCEDURE — 0001A COVID-19, MRNA, LNP-S, PF, 30 MCG/0.3 ML DOSE VACCINE: CPT | Mod: S$GLB,,, | Performed by: FAMILY MEDICINE

## 2021-02-25 PROCEDURE — 0001A COVID-19, MRNA, LNP-S, PF, 30 MCG/0.3 ML DOSE VACCINE: ICD-10-PCS | Mod: S$GLB,,, | Performed by: FAMILY MEDICINE

## 2021-02-25 PROCEDURE — 91300 COVID-19, MRNA, LNP-S, PF, 30 MCG/0.3 ML DOSE VACCINE: CPT | Mod: S$GLB,,, | Performed by: FAMILY MEDICINE

## 2021-02-25 PROCEDURE — 91300 COVID-19, MRNA, LNP-S, PF, 30 MCG/0.3 ML DOSE VACCINE: ICD-10-PCS | Mod: S$GLB,,, | Performed by: FAMILY MEDICINE

## 2021-03-18 ENCOUNTER — IMMUNIZATION (OUTPATIENT)
Dept: PRIMARY CARE CLINIC | Facility: CLINIC | Age: 61
End: 2021-03-18
Payer: COMMERCIAL

## 2021-03-18 DIAGNOSIS — Z23 NEED FOR VACCINATION: Primary | ICD-10-CM

## 2021-03-18 PROCEDURE — 0002A COVID-19, MRNA, LNP-S, PF, 30 MCG/0.3 ML DOSE VACCINE: CPT | Mod: CV19,S$GLB,, | Performed by: FAMILY MEDICINE

## 2021-03-18 PROCEDURE — 91300 COVID-19, MRNA, LNP-S, PF, 30 MCG/0.3 ML DOSE VACCINE: CPT | Mod: S$GLB,,, | Performed by: FAMILY MEDICINE

## 2021-03-18 PROCEDURE — 91300 COVID-19, MRNA, LNP-S, PF, 30 MCG/0.3 ML DOSE VACCINE: ICD-10-PCS | Mod: S$GLB,,, | Performed by: FAMILY MEDICINE

## 2021-03-18 PROCEDURE — 0002A COVID-19, MRNA, LNP-S, PF, 30 MCG/0.3 ML DOSE VACCINE: ICD-10-PCS | Mod: CV19,S$GLB,, | Performed by: FAMILY MEDICINE

## 2021-06-28 ENCOUNTER — OFFICE VISIT (OUTPATIENT)
Dept: INFECTIOUS DISEASES | Facility: CLINIC | Age: 61
End: 2021-06-28
Payer: COMMERCIAL

## 2021-06-28 ENCOUNTER — TELEPHONE (OUTPATIENT)
Dept: CARDIOLOGY | Facility: CLINIC | Age: 61
End: 2021-06-28

## 2021-06-28 ENCOUNTER — IMMUNIZATION (OUTPATIENT)
Dept: PHARMACY | Facility: CLINIC | Age: 61
End: 2021-06-28
Payer: COMMERCIAL

## 2021-06-28 VITALS
OXYGEN SATURATION: 98 % | WEIGHT: 158.81 LBS | TEMPERATURE: 97 F | HEART RATE: 83 BPM | DIASTOLIC BLOOD PRESSURE: 80 MMHG | HEIGHT: 66 IN | SYSTOLIC BLOOD PRESSURE: 132 MMHG | BODY MASS INDEX: 25.52 KG/M2

## 2021-06-28 DIAGNOSIS — E78.00 HIGH CHOLESTEROL: ICD-10-CM

## 2021-06-28 DIAGNOSIS — F17.200 SMOKER: ICD-10-CM

## 2021-06-28 DIAGNOSIS — C61 PROSTATE CANCER: ICD-10-CM

## 2021-06-28 DIAGNOSIS — B20 LIPODYSTROPHY DUE TO HIV INFECTION AND ANTIRETROVIRAL THERAPY: ICD-10-CM

## 2021-06-28 DIAGNOSIS — I77.9 AORTA DISORDER: ICD-10-CM

## 2021-06-28 DIAGNOSIS — E88.1 LIPODYSTROPHY DUE TO HIV INFECTION AND ANTIRETROVIRAL THERAPY: ICD-10-CM

## 2021-06-28 DIAGNOSIS — B20 HUMAN IMMUNODEFICIENCY VIRUS (HIV) DISEASE: Primary | ICD-10-CM

## 2021-06-28 DIAGNOSIS — Z79.899 LIPODYSTROPHY DUE TO HIV INFECTION AND ANTIRETROVIRAL THERAPY: ICD-10-CM

## 2021-06-28 PROCEDURE — 1126F PR PAIN SEVERITY QUANTIFIED, NO PAIN PRESENT: ICD-10-PCS | Mod: S$GLB,,, | Performed by: INTERNAL MEDICINE

## 2021-06-28 PROCEDURE — 1126F AMNT PAIN NOTED NONE PRSNT: CPT | Mod: S$GLB,,, | Performed by: INTERNAL MEDICINE

## 2021-06-28 PROCEDURE — 99214 PR OFFICE/OUTPT VISIT, EST, LEVL IV, 30-39 MIN: ICD-10-PCS | Mod: S$GLB,,, | Performed by: INTERNAL MEDICINE

## 2021-06-28 PROCEDURE — 99214 OFFICE O/P EST MOD 30 MIN: CPT | Mod: S$GLB,,, | Performed by: INTERNAL MEDICINE

## 2021-06-28 RX ORDER — EFAVIRENZ 600 MG/1
600 TABLET, FILM COATED ORAL NIGHTLY
Qty: 90 TABLET | Refills: 1 | Status: SHIPPED | OUTPATIENT
Start: 2021-06-28 | End: 2022-03-28 | Stop reason: SDUPTHER

## 2021-06-28 RX ORDER — EFAVIRENZ 600 MG/1
600 TABLET, FILM COATED ORAL NIGHTLY
Qty: 30 TABLET | Refills: 11 | Status: SHIPPED | OUTPATIENT
Start: 2021-06-28 | End: 2022-06-27 | Stop reason: SDUPTHER

## 2021-07-02 ENCOUNTER — HOSPITAL ENCOUNTER (OUTPATIENT)
Dept: RADIOLOGY | Facility: HOSPITAL | Age: 61
Discharge: HOME | End: 2021-07-02
Attending: INTERNAL MEDICINE
Payer: COMMERCIAL

## 2021-07-02 DIAGNOSIS — I77.9 AORTA DISORDER: ICD-10-CM

## 2021-07-02 LAB
CREAT SERPL-MCNC: 0.9 MG/DL (ref 0.5–1.4)
SAMPLE: NORMAL

## 2021-07-02 PROCEDURE — 25500020 PHARM REV CODE 255: Mod: PO | Performed by: INTERNAL MEDICINE

## 2021-07-02 PROCEDURE — 71275 CT ANGIOGRAPHY CHEST: CPT | Mod: TC,PO

## 2021-07-02 PROCEDURE — 82565 ASSAY OF CREATININE: CPT | Mod: PO

## 2021-07-02 RX ADMIN — IOHEXOL 100 ML: 350 INJECTION, SOLUTION INTRAVENOUS at 02:07

## 2021-07-03 LAB
ALBUMIN SERPL-MCNC: 5.1 G/DL (ref 3.8–4.8)
ALBUMIN/GLOB SERPL: 2.3 {RATIO} (ref 1.2–2.2)
ALP SERPL-CCNC: 108 IU/L (ref 48–121)
ALT SERPL-CCNC: 17 IU/L (ref 0–44)
AST SERPL-CCNC: 18 IU/L (ref 0–40)
BILIRUB SERPL-MCNC: 0.3 MG/DL (ref 0–1.2)
BUN SERPL-MCNC: 13 MG/DL (ref 8–27)
BUN/CREAT SERPL: 14 (ref 10–24)
CALCIUM SERPL-MCNC: 9.5 MG/DL (ref 8.6–10.2)
CHLORIDE SERPL-SCNC: 103 MMOL/L (ref 96–106)
CHOLEST SERPL-MCNC: 192 MG/DL (ref 100–199)
CO2 SERPL-SCNC: 24 MMOL/L (ref 20–29)
CREAT SERPL-MCNC: 0.94 MG/DL (ref 0.76–1.27)
GLOBULIN SER CALC-MCNC: 2.2 G/DL (ref 1.5–4.5)
GLUCOSE SERPL-MCNC: 107 MG/DL (ref 65–99)
HDLC SERPL-MCNC: 30 MG/DL
HIV1 RNA # SERPL NAA+PROBE: <20 COPIES/ML
HIV1 RNA SERPL NAA+PROBE-LOG#: NORMAL LOG10COPY/ML
LDLC SERPL CALC-MCNC: 121 MG/DL (ref 0–99)
POTASSIUM SERPL-SCNC: 4.5 MMOL/L (ref 3.5–5.2)
PROT SERPL-MCNC: 7.3 G/DL (ref 6–8.5)
RPR SER QL: NON REACTIVE
SODIUM SERPL-SCNC: 139 MMOL/L (ref 134–144)
TRIGL SERPL-MCNC: 234 MG/DL (ref 0–149)
VLDLC SERPL CALC-MCNC: 41 MG/DL (ref 5–40)

## 2021-07-08 ENCOUNTER — TELEPHONE (OUTPATIENT)
Dept: CARDIOLOGY | Facility: CLINIC | Age: 61
End: 2021-07-08

## 2021-07-13 ENCOUNTER — OFFICE VISIT (OUTPATIENT)
Dept: CARDIOLOGY | Facility: CLINIC | Age: 61
End: 2021-07-13
Payer: COMMERCIAL

## 2021-07-13 VITALS
DIASTOLIC BLOOD PRESSURE: 86 MMHG | HEART RATE: 100 BPM | WEIGHT: 153.19 LBS | OXYGEN SATURATION: 96 % | BODY MASS INDEX: 24.73 KG/M2 | SYSTOLIC BLOOD PRESSURE: 138 MMHG

## 2021-07-13 DIAGNOSIS — C61 PROSTATE CANCER: ICD-10-CM

## 2021-07-13 DIAGNOSIS — I77.819 AORTIC ECTASIA: ICD-10-CM

## 2021-07-13 DIAGNOSIS — B20 HIV INFECTION, UNSPECIFIED SYMPTOM STATUS: Chronic | ICD-10-CM

## 2021-07-13 DIAGNOSIS — N40.2 PROSTATE NODULE: ICD-10-CM

## 2021-07-13 DIAGNOSIS — E78.5 HYPERLIPIDEMIA, UNSPECIFIED HYPERLIPIDEMIA TYPE: Chronic | ICD-10-CM

## 2021-07-13 DIAGNOSIS — I77.9 AORTA DISORDER: Primary | ICD-10-CM

## 2021-07-13 PROCEDURE — 99204 OFFICE O/P NEW MOD 45 MIN: CPT | Mod: S$GLB,,, | Performed by: INTERNAL MEDICINE

## 2021-07-13 PROCEDURE — 1126F PR PAIN SEVERITY QUANTIFIED, NO PAIN PRESENT: ICD-10-PCS | Mod: S$GLB,,, | Performed by: INTERNAL MEDICINE

## 2021-07-13 PROCEDURE — 99204 PR OFFICE/OUTPT VISIT, NEW, LEVL IV, 45-59 MIN: ICD-10-PCS | Mod: S$GLB,,, | Performed by: INTERNAL MEDICINE

## 2021-07-13 PROCEDURE — 93000 ELECTROCARDIOGRAM COMPLETE: CPT | Mod: S$GLB,,, | Performed by: INTERNAL MEDICINE

## 2021-07-13 PROCEDURE — 1126F AMNT PAIN NOTED NONE PRSNT: CPT | Mod: S$GLB,,, | Performed by: INTERNAL MEDICINE

## 2021-07-13 PROCEDURE — 3008F BODY MASS INDEX DOCD: CPT | Mod: CPTII,S$GLB,, | Performed by: INTERNAL MEDICINE

## 2021-07-13 PROCEDURE — 3008F PR BODY MASS INDEX (BMI) DOCUMENTED: ICD-10-PCS | Mod: CPTII,S$GLB,, | Performed by: INTERNAL MEDICINE

## 2021-07-13 PROCEDURE — 93000 EKG 12-LEAD: ICD-10-PCS | Mod: S$GLB,,, | Performed by: INTERNAL MEDICINE

## 2021-07-15 ENCOUNTER — TELEPHONE (OUTPATIENT)
Dept: CARDIOLOGY | Facility: CLINIC | Age: 61
End: 2021-07-15

## 2021-07-15 DIAGNOSIS — I77.9 AORTA DISORDER: Primary | ICD-10-CM

## 2021-07-23 ENCOUNTER — LAB VISIT (OUTPATIENT)
Dept: LAB | Facility: HOSPITAL | Age: 61
End: 2021-07-23
Attending: NURSE PRACTITIONER
Payer: COMMERCIAL

## 2021-07-23 DIAGNOSIS — N20.0 KIDNEY STONES: ICD-10-CM

## 2021-07-23 DIAGNOSIS — C61 PROSTATE CANCER: ICD-10-CM

## 2021-07-23 DIAGNOSIS — N40.2 PROSTATE NODULE: ICD-10-CM

## 2021-07-23 DIAGNOSIS — R31.29 MICROHEMATURIA: ICD-10-CM

## 2021-07-23 LAB — COMPLEXED PSA SERPL-MCNC: <0.01 NG/ML (ref 0–4)

## 2021-07-23 PROCEDURE — 84153 ASSAY OF PSA TOTAL: CPT | Performed by: NURSE PRACTITIONER

## 2021-07-23 PROCEDURE — 36415 COLL VENOUS BLD VENIPUNCTURE: CPT | Performed by: NURSE PRACTITIONER

## 2021-07-26 ENCOUNTER — OFFICE VISIT (OUTPATIENT)
Dept: UROLOGY | Facility: CLINIC | Age: 61
End: 2021-07-26
Payer: COMMERCIAL

## 2021-07-26 VITALS
HEIGHT: 66 IN | SYSTOLIC BLOOD PRESSURE: 136 MMHG | WEIGHT: 152.13 LBS | HEART RATE: 97 BPM | DIASTOLIC BLOOD PRESSURE: 82 MMHG | BODY MASS INDEX: 24.45 KG/M2

## 2021-07-26 DIAGNOSIS — Z85.46 HISTORY OF PROSTATE CANCER: Primary | ICD-10-CM

## 2021-07-26 DIAGNOSIS — R31.29 MICROHEMATURIA: ICD-10-CM

## 2021-07-26 DIAGNOSIS — R31.9 HEMATURIA OF UNKNOWN CAUSE: ICD-10-CM

## 2021-07-26 DIAGNOSIS — N20.0 KIDNEY STONES: ICD-10-CM

## 2021-07-26 LAB
BILIRUB SERPL-MCNC: ABNORMAL MG/DL
BLOOD URINE, POC: ABNORMAL
CLARITY, POC UA: CLEAR
COLOR, POC UA: YELLOW
GLUCOSE UR QL STRIP: ABNORMAL
KETONES UR QL STRIP: ABNORMAL
LEUKOCYTE ESTERASE URINE, POC: ABNORMAL
NITRITE, POC UA: ABNORMAL
PH, POC UA: 7
PROTEIN, POC: ABNORMAL
SPECIFIC GRAVITY, POC UA: 1.02
UROBILINOGEN, POC UA: 0.2

## 2021-07-26 PROCEDURE — 88112 CYTOPATH CELL ENHANCE TECH: CPT | Performed by: PATHOLOGY

## 2021-07-26 PROCEDURE — 81002 POCT URINE DIPSTICK WITHOUT MICROSCOPE: ICD-10-PCS | Mod: S$GLB,,, | Performed by: UROLOGY

## 2021-07-26 PROCEDURE — 1160F RVW MEDS BY RX/DR IN RCRD: CPT | Mod: CPTII,S$GLB,, | Performed by: UROLOGY

## 2021-07-26 PROCEDURE — 81002 URINALYSIS NONAUTO W/O SCOPE: CPT | Mod: S$GLB,,, | Performed by: UROLOGY

## 2021-07-26 PROCEDURE — 99215 OFFICE O/P EST HI 40 MIN: CPT | Mod: S$GLB,,, | Performed by: UROLOGY

## 2021-07-26 PROCEDURE — 88112 CYTOPATH CELL ENHANCE TECH: CPT | Mod: 26,,, | Performed by: PATHOLOGY

## 2021-07-26 PROCEDURE — 3008F BODY MASS INDEX DOCD: CPT | Mod: CPTII,S$GLB,, | Performed by: UROLOGY

## 2021-07-26 PROCEDURE — 1159F MED LIST DOCD IN RCRD: CPT | Mod: CPTII,S$GLB,, | Performed by: UROLOGY

## 2021-07-26 PROCEDURE — 1126F AMNT PAIN NOTED NONE PRSNT: CPT | Mod: CPTII,S$GLB,, | Performed by: UROLOGY

## 2021-07-26 PROCEDURE — 1160F PR REVIEW ALL MEDS BY PRESCRIBER/CLIN PHARMACIST DOCUMENTED: ICD-10-PCS | Mod: CPTII,S$GLB,, | Performed by: UROLOGY

## 2021-07-26 PROCEDURE — 1126F PR PAIN SEVERITY QUANTIFIED, NO PAIN PRESENT: ICD-10-PCS | Mod: CPTII,S$GLB,, | Performed by: UROLOGY

## 2021-07-26 PROCEDURE — 1159F PR MEDICATION LIST DOCUMENTED IN MEDICAL RECORD: ICD-10-PCS | Mod: CPTII,S$GLB,, | Performed by: UROLOGY

## 2021-07-26 PROCEDURE — 88112 PR  CYTOPATH, CELL ENHANCE TECH: ICD-10-PCS | Mod: 26,,, | Performed by: PATHOLOGY

## 2021-07-26 PROCEDURE — 99215 PR OFFICE/OUTPT VISIT, EST, LEVL V, 40-54 MIN: ICD-10-PCS | Mod: S$GLB,,, | Performed by: UROLOGY

## 2021-07-26 PROCEDURE — 99999 PR PBB SHADOW E&M-EST. PATIENT-LVL V: CPT | Mod: PBBFAC,,, | Performed by: UROLOGY

## 2021-07-26 PROCEDURE — 3008F PR BODY MASS INDEX (BMI) DOCUMENTED: ICD-10-PCS | Mod: CPTII,S$GLB,, | Performed by: UROLOGY

## 2021-07-26 PROCEDURE — 99999 PR PBB SHADOW E&M-EST. PATIENT-LVL V: ICD-10-PCS | Mod: PBBFAC,,, | Performed by: UROLOGY

## 2021-07-26 RX ORDER — LIDOCAINE HYDROCHLORIDE 20 MG/ML
JELLY TOPICAL ONCE
Status: CANCELLED | OUTPATIENT
Start: 2021-07-26 | End: 2021-07-26

## 2021-07-29 LAB
FINAL PATHOLOGIC DIAGNOSIS: ABNORMAL
Lab: ABNORMAL

## 2021-07-30 ENCOUNTER — HOSPITAL ENCOUNTER (OUTPATIENT)
Dept: RADIOLOGY | Facility: HOSPITAL | Age: 61
Discharge: HOME | End: 2021-07-30
Attending: UROLOGY
Payer: COMMERCIAL

## 2021-07-30 DIAGNOSIS — Z85.46 HISTORY OF PROSTATE CANCER: Primary | ICD-10-CM

## 2021-07-30 DIAGNOSIS — R31.9 HEMATURIA OF UNKNOWN CAUSE: ICD-10-CM

## 2021-07-30 PROCEDURE — 74178 CT ABD&PLV WO CNTR FLWD CNTR: CPT | Mod: TC

## 2021-07-30 PROCEDURE — 74178 CT UROGRAM ABD PELVIS W WO: ICD-10-PCS | Mod: 26,,, | Performed by: RADIOLOGY

## 2021-07-30 PROCEDURE — 74178 CT ABD&PLV WO CNTR FLWD CNTR: CPT | Mod: 26,,, | Performed by: RADIOLOGY

## 2021-07-30 PROCEDURE — 25500020 PHARM REV CODE 255

## 2021-07-30 RX ADMIN — IOHEXOL 125 ML: 350 INJECTION, SOLUTION INTRAVENOUS at 08:07

## 2021-08-14 ENCOUNTER — LAB VISIT (OUTPATIENT)
Dept: PRIMARY CARE CLINIC | Facility: CLINIC | Age: 61
End: 2021-08-14
Payer: COMMERCIAL

## 2021-08-14 DIAGNOSIS — Z85.46 HISTORY OF PROSTATE CANCER: ICD-10-CM

## 2021-08-14 PROCEDURE — U0005 INFEC AGEN DETEC AMPLI PROBE: HCPCS | Performed by: UROLOGY

## 2021-08-14 PROCEDURE — U0003 INFECTIOUS AGENT DETECTION BY NUCLEIC ACID (DNA OR RNA); SEVERE ACUTE RESPIRATORY SYNDROME CORONAVIRUS 2 (SARS-COV-2) (CORONAVIRUS DISEASE [COVID-19]), AMPLIFIED PROBE TECHNIQUE, MAKING USE OF HIGH THROUGHPUT TECHNOLOGIES AS DESCRIBED BY CMS-2020-01-R: HCPCS | Performed by: UROLOGY

## 2021-08-15 LAB
SARS-COV-2 RNA RESP QL NAA+PROBE: NOT DETECTED
SARS-COV-2- CYCLE NUMBER: -1

## 2021-08-17 ENCOUNTER — HOSPITAL ENCOUNTER (OUTPATIENT)
Facility: AMBULARY SURGERY CENTER | Age: 61
Discharge: HOME | End: 2021-08-17
Attending: UROLOGY | Admitting: UROLOGY
Payer: COMMERCIAL

## 2021-08-17 DIAGNOSIS — Z85.46 HISTORY OF PROSTATE CANCER: Primary | ICD-10-CM

## 2021-08-17 DIAGNOSIS — Z85.46 HISTORY OF PROSTATE CANCER: ICD-10-CM

## 2021-08-17 DIAGNOSIS — R31.29 MICROHEMATURIA: ICD-10-CM

## 2021-08-17 PROCEDURE — 88112 CYTOPATH CELL ENHANCE TECH: CPT | Performed by: PATHOLOGY

## 2021-08-17 PROCEDURE — 52000 PR CYSTOURETHROSCOPY: ICD-10-PCS | Mod: ,,, | Performed by: UROLOGY

## 2021-08-17 PROCEDURE — 88112 CYTOPATH CELL ENHANCE TECH: CPT | Mod: 26,,, | Performed by: PATHOLOGY

## 2021-08-17 PROCEDURE — 52000 CYSTOURETHROSCOPY: CPT | Mod: ,,, | Performed by: UROLOGY

## 2021-08-17 PROCEDURE — 87086 URINE CULTURE/COLONY COUNT: CPT | Performed by: UROLOGY

## 2021-08-17 PROCEDURE — 52000 CYSTOURETHROSCOPY: CPT | Mod: 52 | Performed by: UROLOGY

## 2021-08-17 PROCEDURE — 88112 PR  CYTOPATH, CELL ENHANCE TECH: ICD-10-PCS | Mod: 26,,, | Performed by: PATHOLOGY

## 2021-08-17 RX ORDER — LIDOCAINE HYDROCHLORIDE 20 MG/ML
JELLY TOPICAL
Status: DISCONTINUED | OUTPATIENT
Start: 2021-08-17 | End: 2021-08-17 | Stop reason: HOSPADM

## 2021-08-17 RX ORDER — WATER 1 ML/ML
IRRIGANT IRRIGATION
Status: DISCONTINUED | OUTPATIENT
Start: 2021-08-17 | End: 2021-08-17 | Stop reason: HOSPADM

## 2021-08-17 RX ORDER — SULFAMETHOXAZOLE AND TRIMETHOPRIM 800; 160 MG/1; MG/1
1 TABLET ORAL 2 TIMES DAILY
Qty: 14 TABLET | Refills: 0 | Status: SHIPPED | OUTPATIENT
Start: 2021-08-17 | End: 2021-08-24

## 2021-08-18 VITALS
WEIGHT: 152 LBS | RESPIRATION RATE: 18 BRPM | DIASTOLIC BLOOD PRESSURE: 78 MMHG | SYSTOLIC BLOOD PRESSURE: 129 MMHG | BODY MASS INDEX: 24.43 KG/M2 | OXYGEN SATURATION: 99 % | TEMPERATURE: 98 F | HEART RATE: 84 BPM | HEIGHT: 66 IN

## 2021-08-18 DIAGNOSIS — N21.0 BLADDER STONES: ICD-10-CM

## 2021-08-18 DIAGNOSIS — N32.0 BLADDER NECK CONTRACTURE: Primary | ICD-10-CM

## 2021-08-18 LAB
FINAL PATHOLOGIC DIAGNOSIS: NORMAL
Lab: NORMAL

## 2021-08-19 LAB — BACTERIA UR CULT: NO GROWTH

## 2021-08-22 DIAGNOSIS — M54.31 SCIATICA OF RIGHT SIDE: ICD-10-CM

## 2021-08-22 DIAGNOSIS — M62.830 BACK MUSCLE SPASM: ICD-10-CM

## 2021-08-23 RX ORDER — GABAPENTIN 300 MG/1
300 CAPSULE ORAL 3 TIMES DAILY
Qty: 90 CAPSULE | Refills: 11 | Status: SHIPPED | OUTPATIENT
Start: 2021-08-23 | End: 2022-06-27

## 2021-08-24 ENCOUNTER — LAB VISIT (OUTPATIENT)
Dept: LAB | Facility: HOSPITAL | Age: 61
End: 2021-08-24
Attending: UROLOGY
Payer: COMMERCIAL

## 2021-08-24 DIAGNOSIS — N21.0 BLADDER STONES: ICD-10-CM

## 2021-08-24 DIAGNOSIS — N32.0 BLADDER NECK CONTRACTURE: ICD-10-CM

## 2021-08-24 LAB
BILIRUB UR QL STRIP: NEGATIVE
CLARITY UR: CLEAR
COLOR UR: YELLOW
GLUCOSE UR QL STRIP: NEGATIVE
HGB UR QL STRIP: ABNORMAL
KETONES UR QL STRIP: NEGATIVE
LEUKOCYTE ESTERASE UR QL STRIP: NEGATIVE
NITRITE UR QL STRIP: NEGATIVE
PH UR STRIP: 6 [PH] (ref 5–8)
PROT UR QL STRIP: NEGATIVE
SP GR UR STRIP: >=1.03 (ref 1–1.03)
URN SPEC COLLECT METH UR: ABNORMAL
UROBILINOGEN UR STRIP-ACNC: NEGATIVE EU/DL

## 2021-08-24 PROCEDURE — 87086 URINE CULTURE/COLONY COUNT: CPT | Performed by: UROLOGY

## 2021-08-24 PROCEDURE — 81003 URINALYSIS AUTO W/O SCOPE: CPT | Performed by: UROLOGY

## 2021-08-26 LAB — BACTERIA UR CULT: NO GROWTH

## 2021-09-06 ENCOUNTER — LAB VISIT (OUTPATIENT)
Dept: PRIMARY CARE CLINIC | Facility: CLINIC | Age: 61
End: 2021-09-06
Payer: COMMERCIAL

## 2021-09-06 DIAGNOSIS — Z85.46 HISTORY OF PROSTATE CANCER: ICD-10-CM

## 2021-09-06 PROCEDURE — U0005 INFEC AGEN DETEC AMPLI PROBE: HCPCS | Performed by: UROLOGY

## 2021-09-06 PROCEDURE — U0003 INFECTIOUS AGENT DETECTION BY NUCLEIC ACID (DNA OR RNA); SEVERE ACUTE RESPIRATORY SYNDROME CORONAVIRUS 2 (SARS-COV-2) (CORONAVIRUS DISEASE [COVID-19]), AMPLIFIED PROBE TECHNIQUE, MAKING USE OF HIGH THROUGHPUT TECHNOLOGIES AS DESCRIBED BY CMS-2020-01-R: HCPCS | Performed by: UROLOGY

## 2021-09-07 LAB
SARS-COV-2 RNA RESP QL NAA+PROBE: NOT DETECTED
SARS-COV-2- CYCLE NUMBER: NORMAL

## 2021-09-08 ENCOUNTER — ANESTHESIA EVENT (OUTPATIENT)
Dept: SURGERY | Facility: HOSPITAL | Age: 61
End: 2021-09-08
Payer: COMMERCIAL

## 2021-09-08 RX ORDER — SODIUM CHLORIDE, SODIUM LACTATE, POTASSIUM CHLORIDE, CALCIUM CHLORIDE 600; 310; 30; 20 MG/100ML; MG/100ML; MG/100ML; MG/100ML
INJECTION, SOLUTION INTRAVENOUS CONTINUOUS
Status: CANCELLED | OUTPATIENT
Start: 2021-09-08

## 2021-09-09 ENCOUNTER — HOSPITAL ENCOUNTER (OUTPATIENT)
Facility: HOSPITAL | Age: 61
Discharge: HOME | End: 2021-09-09
Attending: UROLOGY | Admitting: UROLOGY
Payer: COMMERCIAL

## 2021-09-09 ENCOUNTER — ANESTHESIA (OUTPATIENT)
Dept: SURGERY | Facility: HOSPITAL | Age: 61
End: 2021-09-09
Payer: COMMERCIAL

## 2021-09-09 DIAGNOSIS — N32.0 BLADDER NECK CONTRACTURE: ICD-10-CM

## 2021-09-09 DIAGNOSIS — N21.0 BLADDER STONES: ICD-10-CM

## 2021-09-09 PROCEDURE — 52640 RELIEVE BLADDER CONTRACTURE: CPT | Mod: 51,,, | Performed by: UROLOGY

## 2021-09-09 PROCEDURE — 52317 REMOVE BLADDER STONE: CPT | Mod: ,,, | Performed by: UROLOGY

## 2021-09-09 PROCEDURE — 63600175 PHARM REV CODE 636 W HCPCS: Performed by: UROLOGY

## 2021-09-09 PROCEDURE — 27200651 HC AIRWAY, LMA: Performed by: ANESTHESIOLOGY

## 2021-09-09 PROCEDURE — 36000706: Performed by: UROLOGY

## 2021-09-09 PROCEDURE — D9220A PRA ANESTHESIA: Mod: ,,, | Performed by: ANESTHESIOLOGY

## 2021-09-09 PROCEDURE — 52317 PR REMOVE BLADDER STONE,<2.5 CM: ICD-10-PCS | Mod: ,,, | Performed by: UROLOGY

## 2021-09-09 PROCEDURE — 63600175 PHARM REV CODE 636 W HCPCS: Performed by: NURSE ANESTHETIST, CERTIFIED REGISTERED

## 2021-09-09 PROCEDURE — 37000008 HC ANESTHESIA 1ST 15 MINUTES: Performed by: UROLOGY

## 2021-09-09 PROCEDURE — D9220A PRA ANESTHESIA: ICD-10-PCS | Mod: ,,, | Performed by: NURSE ANESTHETIST, CERTIFIED REGISTERED

## 2021-09-09 PROCEDURE — 52640 PR RELIEVE POSTOP BLADDER CONTRACTURE: ICD-10-PCS | Mod: 51,,, | Performed by: UROLOGY

## 2021-09-09 PROCEDURE — 99900104 DSU ONLY-NO CHARGE-EA ADD'L HR (STAT): Performed by: UROLOGY

## 2021-09-09 PROCEDURE — D9220A PRA ANESTHESIA: Mod: ,,, | Performed by: NURSE ANESTHETIST, CERTIFIED REGISTERED

## 2021-09-09 PROCEDURE — 25000003 PHARM REV CODE 250: Performed by: ANESTHESIOLOGY

## 2021-09-09 PROCEDURE — 99900103 DSU ONLY-NO CHARGE-INITIAL HR (STAT): Performed by: UROLOGY

## 2021-09-09 PROCEDURE — 82365 CALCULUS SPECTROSCOPY: CPT | Performed by: UROLOGY

## 2021-09-09 PROCEDURE — 36000707: Performed by: UROLOGY

## 2021-09-09 PROCEDURE — C1769 GUIDE WIRE: HCPCS | Performed by: UROLOGY

## 2021-09-09 PROCEDURE — C1729 CATH, DRAINAGE: HCPCS | Performed by: UROLOGY

## 2021-09-09 PROCEDURE — 71000033 HC RECOVERY, INTIAL HOUR: Performed by: UROLOGY

## 2021-09-09 PROCEDURE — 63600175 PHARM REV CODE 636 W HCPCS: Performed by: ANESTHESIOLOGY

## 2021-09-09 PROCEDURE — D9220A PRA ANESTHESIA: ICD-10-PCS | Mod: ,,, | Performed by: ANESTHESIOLOGY

## 2021-09-09 PROCEDURE — 37000009 HC ANESTHESIA EA ADD 15 MINS: Performed by: UROLOGY

## 2021-09-09 PROCEDURE — 71000015 HC POSTOP RECOV 1ST HR: Performed by: UROLOGY

## 2021-09-09 PROCEDURE — 25000003 PHARM REV CODE 250: Performed by: NURSE ANESTHETIST, CERTIFIED REGISTERED

## 2021-09-09 RX ORDER — PHENYLEPHRINE HYDROCHLORIDE 10 MG/ML
INJECTION INTRAVENOUS
Status: DISCONTINUED | OUTPATIENT
Start: 2021-09-09 | End: 2021-09-09

## 2021-09-09 RX ORDER — ONDANSETRON 2 MG/ML
INJECTION INTRAMUSCULAR; INTRAVENOUS
Status: DISCONTINUED | OUTPATIENT
Start: 2021-09-09 | End: 2021-09-09

## 2021-09-09 RX ORDER — PROPOFOL 10 MG/ML
VIAL (ML) INTRAVENOUS
Status: DISCONTINUED | OUTPATIENT
Start: 2021-09-09 | End: 2021-09-09

## 2021-09-09 RX ORDER — ACETAMINOPHEN 10 MG/ML
INJECTION, SOLUTION INTRAVENOUS
Status: DISCONTINUED | OUTPATIENT
Start: 2021-09-09 | End: 2021-09-09

## 2021-09-09 RX ORDER — DIPHENHYDRAMINE HYDROCHLORIDE 50 MG/ML
25 INJECTION INTRAMUSCULAR; INTRAVENOUS EVERY 6 HOURS PRN
Status: DISCONTINUED | OUTPATIENT
Start: 2021-09-09 | End: 2021-09-09 | Stop reason: HOSPADM

## 2021-09-09 RX ORDER — SODIUM CHLORIDE 0.9 % (FLUSH) 0.9 %
3 SYRINGE (ML) INJECTION
Status: DISCONTINUED | OUTPATIENT
Start: 2021-09-09 | End: 2021-09-09 | Stop reason: HOSPADM

## 2021-09-09 RX ORDER — FENTANYL CITRATE 50 UG/ML
25 INJECTION, SOLUTION INTRAMUSCULAR; INTRAVENOUS EVERY 5 MIN PRN
Status: DISCONTINUED | OUTPATIENT
Start: 2021-09-09 | End: 2021-09-09 | Stop reason: HOSPADM

## 2021-09-09 RX ORDER — MIDAZOLAM HYDROCHLORIDE 1 MG/ML
INJECTION, SOLUTION INTRAMUSCULAR; INTRAVENOUS
Status: DISCONTINUED | OUTPATIENT
Start: 2021-09-09 | End: 2021-09-09

## 2021-09-09 RX ORDER — MEPERIDINE HYDROCHLORIDE 50 MG/ML
12.5 INJECTION INTRAMUSCULAR; INTRAVENOUS; SUBCUTANEOUS ONCE AS NEEDED
Status: DISCONTINUED | OUTPATIENT
Start: 2021-09-09 | End: 2021-09-09 | Stop reason: HOSPADM

## 2021-09-09 RX ORDER — FENTANYL CITRATE 50 UG/ML
INJECTION, SOLUTION INTRAMUSCULAR; INTRAVENOUS
Status: DISCONTINUED | OUTPATIENT
Start: 2021-09-09 | End: 2021-09-09

## 2021-09-09 RX ORDER — LIDOCAINE HYDROCHLORIDE 10 MG/ML
0.5 INJECTION, SOLUTION EPIDURAL; INFILTRATION; INTRACAUDAL; PERINEURAL ONCE
Status: DISCONTINUED | OUTPATIENT
Start: 2021-09-09 | End: 2021-09-09 | Stop reason: HOSPADM

## 2021-09-09 RX ORDER — OXYCODONE HYDROCHLORIDE 5 MG/1
5 TABLET ORAL
Status: DISCONTINUED | OUTPATIENT
Start: 2021-09-09 | End: 2021-09-09 | Stop reason: HOSPADM

## 2021-09-09 RX ORDER — DEXAMETHASONE SODIUM PHOSPHATE 4 MG/ML
INJECTION, SOLUTION INTRA-ARTICULAR; INTRALESIONAL; INTRAMUSCULAR; INTRAVENOUS; SOFT TISSUE
Status: DISCONTINUED | OUTPATIENT
Start: 2021-09-09 | End: 2021-09-09

## 2021-09-09 RX ORDER — SODIUM CHLORIDE 0.9 % (FLUSH) 0.9 %
3 SYRINGE (ML) INJECTION EVERY 8 HOURS
Status: DISCONTINUED | OUTPATIENT
Start: 2021-09-09 | End: 2021-09-09 | Stop reason: HOSPADM

## 2021-09-09 RX ORDER — CEFAZOLIN SODIUM 2 G/50ML
2 SOLUTION INTRAVENOUS
Status: DISCONTINUED | OUTPATIENT
Start: 2021-09-09 | End: 2021-09-09 | Stop reason: HOSPADM

## 2021-09-09 RX ORDER — HYDROMORPHONE HYDROCHLORIDE 2 MG/ML
0.2 INJECTION, SOLUTION INTRAMUSCULAR; INTRAVENOUS; SUBCUTANEOUS EVERY 5 MIN PRN
Status: DISCONTINUED | OUTPATIENT
Start: 2021-09-09 | End: 2021-09-09 | Stop reason: HOSPADM

## 2021-09-09 RX ORDER — LIDOCAINE HYDROCHLORIDE 20 MG/ML
INJECTION INTRAVENOUS
Status: DISCONTINUED | OUTPATIENT
Start: 2021-09-09 | End: 2021-09-09

## 2021-09-09 RX ORDER — SULFAMETHOXAZOLE AND TRIMETHOPRIM 800; 160 MG/1; MG/1
1 TABLET ORAL 2 TIMES DAILY
Qty: 10 TABLET | Refills: 0 | Status: SHIPPED | OUTPATIENT
Start: 2021-09-09 | End: 2021-09-14

## 2021-09-09 RX ORDER — ONDANSETRON 2 MG/ML
4 INJECTION INTRAMUSCULAR; INTRAVENOUS DAILY PRN
Status: DISCONTINUED | OUTPATIENT
Start: 2021-09-09 | End: 2021-09-09 | Stop reason: HOSPADM

## 2021-09-09 RX ORDER — KETOROLAC TROMETHAMINE 30 MG/ML
INJECTION, SOLUTION INTRAMUSCULAR; INTRAVENOUS
Status: DISCONTINUED | OUTPATIENT
Start: 2021-09-09 | End: 2021-09-09

## 2021-09-09 RX ADMIN — KETOROLAC TROMETHAMINE 30 MG: 30 INJECTION, SOLUTION INTRAMUSCULAR; INTRAVENOUS at 12:09

## 2021-09-09 RX ADMIN — PHENYLEPHRINE HYDROCHLORIDE 150 MCG: 10 INJECTION INTRAVENOUS at 12:09

## 2021-09-09 RX ADMIN — FENTANYL CITRATE 50 MCG: 50 INJECTION, SOLUTION INTRAMUSCULAR; INTRAVENOUS at 12:09

## 2021-09-09 RX ADMIN — GLYCOPYRROLATE 0.2 MG: 0.2 INJECTION, SOLUTION INTRAMUSCULAR; INTRAVITREAL at 12:09

## 2021-09-09 RX ADMIN — LIDOCAINE HYDROCHLORIDE 100 MG: 20 INJECTION, SOLUTION INTRAVENOUS at 12:09

## 2021-09-09 RX ADMIN — SODIUM CHLORIDE, SODIUM GLUCONATE, SODIUM ACETATE, POTASSIUM CHLORIDE, MAGNESIUM CHLORIDE, SODIUM PHOSPHATE, DIBASIC, AND POTASSIUM PHOSPHATE: .53; .5; .37; .037; .03; .012; .00082 INJECTION, SOLUTION INTRAVENOUS at 10:09

## 2021-09-09 RX ADMIN — FENTANYL CITRATE 25 MCG: 50 INJECTION INTRAMUSCULAR; INTRAVENOUS at 01:09

## 2021-09-09 RX ADMIN — OXYCODONE 5 MG: 5 TABLET ORAL at 01:09

## 2021-09-09 RX ADMIN — FENTANYL CITRATE 25 MCG: 50 INJECTION, SOLUTION INTRAMUSCULAR; INTRAVENOUS at 12:09

## 2021-09-09 RX ADMIN — PROPOFOL 200 MG: 10 INJECTION, EMULSION INTRAVENOUS at 12:09

## 2021-09-09 RX ADMIN — MIDAZOLAM 2 MG: 1 INJECTION INTRAMUSCULAR; INTRAVENOUS at 12:09

## 2021-09-09 RX ADMIN — DEXAMETHASONE SODIUM PHOSPHATE 4 MG: 4 INJECTION, SOLUTION INTRA-ARTICULAR; INTRALESIONAL; INTRAMUSCULAR; INTRAVENOUS; SOFT TISSUE at 12:09

## 2021-09-09 RX ADMIN — ACETAMINOPHEN 1000 MG: 10 INJECTION, SOLUTION INTRAVENOUS at 12:09

## 2021-09-09 RX ADMIN — CEFAZOLIN SODIUM 2 G: 2 SOLUTION INTRAVENOUS at 12:09

## 2021-09-09 RX ADMIN — ONDANSETRON 4 MG: 2 INJECTION, SOLUTION INTRAMUSCULAR; INTRAVENOUS at 12:09

## 2021-09-10 VITALS
HEART RATE: 78 BPM | WEIGHT: 159 LBS | BODY MASS INDEX: 25.55 KG/M2 | OXYGEN SATURATION: 95 % | RESPIRATION RATE: 18 BRPM | TEMPERATURE: 98 F | DIASTOLIC BLOOD PRESSURE: 75 MMHG | SYSTOLIC BLOOD PRESSURE: 130 MMHG | HEIGHT: 66 IN

## 2021-09-15 ENCOUNTER — CLINICAL SUPPORT (OUTPATIENT)
Dept: UROLOGY | Facility: CLINIC | Age: 61
End: 2021-09-15
Payer: COMMERCIAL

## 2021-09-15 DIAGNOSIS — N40.0 BPH WITHOUT URINARY OBSTRUCTION: Primary | ICD-10-CM

## 2021-09-15 LAB
COMPN STONE: NORMAL
SPECIMEN SOURCE: NORMAL
STONE ANALYSIS IR-IMP: NORMAL

## 2021-09-15 PROCEDURE — 99499 UNLISTED E&M SERVICE: CPT | Mod: S$GLB,,, | Performed by: UROLOGY

## 2021-09-15 PROCEDURE — 99499 NO LOS: ICD-10-PCS | Mod: S$GLB,,, | Performed by: UROLOGY

## 2021-11-08 ENCOUNTER — IMMUNIZATION (OUTPATIENT)
Dept: PRIMARY CARE CLINIC | Facility: CLINIC | Age: 61
End: 2021-11-08
Payer: COMMERCIAL

## 2021-11-08 DIAGNOSIS — Z23 NEED FOR VACCINATION: Primary | ICD-10-CM

## 2021-11-08 PROCEDURE — 0003A COVID-19, MRNA, LNP-S, PF, 30 MCG/0.3 ML DOSE VACCINE: ICD-10-PCS | Mod: S$GLB,,, | Performed by: FAMILY MEDICINE

## 2021-11-08 PROCEDURE — 91300 COVID-19, MRNA, LNP-S, PF, 30 MCG/0.3 ML DOSE VACCINE: CPT | Mod: S$GLB,,, | Performed by: FAMILY MEDICINE

## 2021-11-08 PROCEDURE — 0003A COVID-19, MRNA, LNP-S, PF, 30 MCG/0.3 ML DOSE VACCINE: CPT | Mod: S$GLB,,, | Performed by: FAMILY MEDICINE

## 2021-11-08 PROCEDURE — 91300 COVID-19, MRNA, LNP-S, PF, 30 MCG/0.3 ML DOSE VACCINE: ICD-10-PCS | Mod: S$GLB,,, | Performed by: FAMILY MEDICINE

## 2021-11-17 ENCOUNTER — TELEPHONE (OUTPATIENT)
Dept: UROLOGY | Facility: CLINIC | Age: 61
End: 2021-11-17

## 2021-11-17 ENCOUNTER — CLINICAL SUPPORT (OUTPATIENT)
Dept: UROLOGY | Facility: CLINIC | Age: 61
End: 2021-11-17
Payer: COMMERCIAL

## 2021-11-17 DIAGNOSIS — N40.0 BPH WITHOUT URINARY OBSTRUCTION: Primary | ICD-10-CM

## 2021-11-17 LAB — POC RESIDUAL URINE VOLUME: 0 ML (ref 0–100)

## 2021-11-17 PROCEDURE — 51798 US URINE CAPACITY MEASURE: CPT | Mod: S$GLB,,, | Performed by: UROLOGY

## 2021-11-17 PROCEDURE — 99499 UNLISTED E&M SERVICE: CPT | Mod: S$GLB,,, | Performed by: UROLOGY

## 2021-11-17 PROCEDURE — 51798 POCT BLADDER SCAN: ICD-10-PCS | Mod: S$GLB,,, | Performed by: UROLOGY

## 2021-11-17 PROCEDURE — 99499 NO LOS: ICD-10-PCS | Mod: S$GLB,,, | Performed by: UROLOGY

## 2021-11-22 ENCOUNTER — CLINICAL SUPPORT (OUTPATIENT)
Dept: UROLOGY | Facility: CLINIC | Age: 61
End: 2021-11-22
Payer: COMMERCIAL

## 2021-11-22 DIAGNOSIS — N40.0 BPH WITHOUT URINARY OBSTRUCTION: Primary | ICD-10-CM

## 2021-11-22 LAB — POC RESIDUAL URINE VOLUME: 0 ML (ref 0–100)

## 2021-11-22 PROCEDURE — 51741 ELECTRO-UROFLOWMETRY FIRST: CPT | Mod: S$GLB,,, | Performed by: UROLOGY

## 2021-11-22 PROCEDURE — 51741 PR UROFLOWMETRY, COMPLEX: ICD-10-PCS | Mod: S$GLB,,, | Performed by: UROLOGY

## 2021-11-22 PROCEDURE — 99499 NO LOS: ICD-10-PCS | Mod: S$GLB,,, | Performed by: UROLOGY

## 2021-11-22 PROCEDURE — 51798 POCT BLADDER SCAN: ICD-10-PCS | Mod: S$GLB,,, | Performed by: UROLOGY

## 2021-11-22 PROCEDURE — 99499 UNLISTED E&M SERVICE: CPT | Mod: S$GLB,,, | Performed by: UROLOGY

## 2021-11-22 PROCEDURE — 51798 US URINE CAPACITY MEASURE: CPT | Mod: S$GLB,,, | Performed by: UROLOGY

## 2021-12-01 ENCOUNTER — TELEPHONE (OUTPATIENT)
Dept: UROLOGY | Facility: CLINIC | Age: 61
End: 2021-12-01
Payer: COMMERCIAL

## 2021-12-01 DIAGNOSIS — Z85.46 HISTORY OF PROSTATE CANCER: Primary | ICD-10-CM

## 2021-12-29 ENCOUNTER — OFFICE VISIT (OUTPATIENT)
Dept: INFECTIOUS DISEASES | Facility: CLINIC | Age: 61
End: 2021-12-29
Payer: COMMERCIAL

## 2021-12-29 VITALS
DIASTOLIC BLOOD PRESSURE: 76 MMHG | HEIGHT: 66 IN | SYSTOLIC BLOOD PRESSURE: 116 MMHG | BODY MASS INDEX: 24.11 KG/M2 | WEIGHT: 150 LBS | OXYGEN SATURATION: 96 % | TEMPERATURE: 98 F | HEART RATE: 94 BPM

## 2021-12-29 DIAGNOSIS — Z23 ENCOUNTER FOR IMMUNIZATION: ICD-10-CM

## 2021-12-29 DIAGNOSIS — F17.200 SMOKER: ICD-10-CM

## 2021-12-29 DIAGNOSIS — E78.00 HIGH CHOLESTEROL: ICD-10-CM

## 2021-12-29 DIAGNOSIS — Z71.89 EDUCATED ABOUT COVID-19 VIRUS INFECTION: ICD-10-CM

## 2021-12-29 DIAGNOSIS — M54.31 SCIATICA OF RIGHT SIDE: ICD-10-CM

## 2021-12-29 DIAGNOSIS — I77.9 AORTA DISORDER: ICD-10-CM

## 2021-12-29 DIAGNOSIS — B20 HUMAN IMMUNODEFICIENCY VIRUS (HIV) DISEASE: Primary | ICD-10-CM

## 2021-12-29 PROCEDURE — 99214 OFFICE O/P EST MOD 30 MIN: CPT | Mod: 25,S$GLB,, | Performed by: INTERNAL MEDICINE

## 2021-12-29 PROCEDURE — 90471 FLU VACCINE (QUAD) GREATER THAN OR EQUAL TO 3YO PRESERVATIVE FREE IM: ICD-10-PCS | Mod: S$GLB,,, | Performed by: INTERNAL MEDICINE

## 2021-12-29 PROCEDURE — 1160F PR REVIEW ALL MEDS BY PRESCRIBER/CLIN PHARMACIST DOCUMENTED: ICD-10-PCS | Mod: S$GLB,,, | Performed by: INTERNAL MEDICINE

## 2021-12-29 PROCEDURE — 90686 IIV4 VACC NO PRSV 0.5 ML IM: CPT | Mod: S$GLB,,, | Performed by: INTERNAL MEDICINE

## 2021-12-29 PROCEDURE — 1160F RVW MEDS BY RX/DR IN RCRD: CPT | Mod: S$GLB,,, | Performed by: INTERNAL MEDICINE

## 2021-12-29 PROCEDURE — 90471 IMMUNIZATION ADMIN: CPT | Mod: S$GLB,,, | Performed by: INTERNAL MEDICINE

## 2021-12-29 PROCEDURE — 90686 FLU VACCINE (QUAD) GREATER THAN OR EQUAL TO 3YO PRESERVATIVE FREE IM: ICD-10-PCS | Mod: S$GLB,,, | Performed by: INTERNAL MEDICINE

## 2021-12-29 PROCEDURE — 99214 PR OFFICE/OUTPT VISIT, EST, LEVL IV, 30-39 MIN: ICD-10-PCS | Mod: 25,S$GLB,, | Performed by: INTERNAL MEDICINE

## 2022-01-01 LAB
CD3+CD4+ CELLS # BLD: 430 /UL (ref 359–1519)
CD3+CD4+ CELLS NFR BLD: 39.1 % (ref 30.8–58.5)

## 2022-01-03 LAB
ALBUMIN SERPL-MCNC: 4.8 G/DL (ref 3.8–4.8)
ALBUMIN/GLOB SERPL: 2.4 {RATIO} (ref 1.2–2.2)
ALP SERPL-CCNC: 117 IU/L (ref 44–121)
ALT SERPL-CCNC: 24 IU/L (ref 0–44)
AST SERPL-CCNC: 17 IU/L (ref 0–40)
BILIRUB SERPL-MCNC: 0.4 MG/DL (ref 0–1.2)
BUN SERPL-MCNC: 9 MG/DL (ref 8–27)
BUN/CREAT SERPL: 10 (ref 10–24)
CALCIUM SERPL-MCNC: 9.4 MG/DL (ref 8.6–10.2)
CHLORIDE SERPL-SCNC: 103 MMOL/L (ref 96–106)
CHOLEST SERPL-MCNC: 193 MG/DL (ref 100–199)
CO2 SERPL-SCNC: 23 MMOL/L (ref 20–29)
CREAT SERPL-MCNC: 0.89 MG/DL (ref 0.76–1.27)
GLOBULIN SER CALC-MCNC: 2 G/DL (ref 1.5–4.5)
GLUCOSE SERPL-MCNC: 100 MG/DL (ref 65–99)
HCV AB S/CO SERPL IA: <0.1 S/CO RATIO (ref 0–0.9)
HDLC SERPL-MCNC: 30 MG/DL
HIV1 RNA # SERPL NAA+PROBE: <20 COPIES/ML
HIV1 RNA SERPL NAA+PROBE-LOG#: NORMAL LOG10COPY/ML
LDLC SERPL CALC-MCNC: 108 MG/DL (ref 0–99)
POTASSIUM SERPL-SCNC: 4.5 MMOL/L (ref 3.5–5.2)
PROT SERPL-MCNC: 6.8 G/DL (ref 6–8.5)
SODIUM SERPL-SCNC: 141 MMOL/L (ref 134–144)
TRIGL SERPL-MCNC: 321 MG/DL (ref 0–149)
VLDLC SERPL CALC-MCNC: 55 MG/DL (ref 5–40)

## 2022-01-19 NOTE — ED PROVIDER NOTES
Encounter Date: 11/19/2019       History     Chief Complaint   Patient presents with    Chest Pain     began at 4 AM with chest tightness     5-year-old male who has multiple medical problems including hepatitis-A, HIV positive, hypercholesterolemia, prostate cancer, kidney stones, presents emergency room with a history that 4:00 a.m. this morning he awoke and noted left precordial chest pain described as tight in nature.  There was no associated nausea, vomiting or diaphoresis.  He did have some shortness of breath.  He has no history of diabetes or hypertension.  No history of any known coronary disease.  Patient states that his chest pain though is somewhat worsened when he takes in a deep breath. No coughing or sputum production. No wheezing.  He does smoke 1 pack of cigarettes daily.  No palpitations.        Review of patient's allergies indicates:  No Known Allergies  Past Medical History:   Diagnosis Date    Adverse effect of hepatitis B vaccine     non responder    Hepatitis A 1991    High frequency hearing loss     HIV (human immunodeficiency virus infection) 1995    On COM/cRix until kidney stones, sallie 200    Hypercholesterolemia     Hyperlipidemia     Kidney stone 2005    [facility], Lithotripsy    Lipoatrophy     Mild    Pertussis     As a child    Prostate cancer     Secondary syphilis 2002    Smoker     Vertigo 02/2018    Due to Ear Infection     Past Surgical History:   Procedure Laterality Date    LITHOTRIPSY      ROBOT-ASSISTED LAPAROSCOPIC PROSTATECTOMY N/A 5/29/2019    Procedure: ROBOTIC PROSTATECTOMY;  Surgeon: Baljinder Johsnon MD;  Location: [facility] OR;  Service: Urology;  Laterality: N/A;    TRANSRECTAL BIOPSY OF PROSTATE WITH ULTRASOUND GUIDANCE N/A 9/25/2018    Procedure: BIOPSY, PROSTATE, RECTAL APPROACH, WITH US GUIDANCE;  Surgeon: Baljinder Johnson MD;  Location: [facility] OR;  Service: Urology;  Laterality: N/A;     Family History   Problem Relation Age of Onset    Diabetes Mother  Pt bedrest complete. Pt ambulated to bathroom without difficulties. Site remains CDI. No complaints of pain.    Corbin Marquez MD repaged for discharge order.             in a MVC    COPD Father     Lung cancer Paternal Grandmother      Social History     Tobacco Use    Smoking status: Current Every Day Smoker     Packs/day: 1.00     Types: Cigarettes    Smokeless tobacco: Never Used    Tobacco comment: 20 to 30   Substance Use Topics    Alcohol use: No     Frequency: Never    Drug use: No     Review of Systems   Constitutional: Negative for chills, diaphoresis and fever.   HENT: Negative for congestion, ear pain, rhinorrhea, sinus pressure, sinus pain, sore throat and trouble swallowing.    Eyes: Negative for pain.   Respiratory: Positive for chest tightness and shortness of breath. Negative for cough and wheezing.    Cardiovascular: Positive for chest pain. Negative for palpitations and leg swelling.   Gastrointestinal: Negative for abdominal pain, constipation, diarrhea, nausea and vomiting.   Genitourinary: Negative for difficulty urinating and flank pain.   Skin: Negative for pallor, rash and wound.   Neurological: Negative for headaches.   All other systems reviewed and are negative.      Physical Exam     Initial Vitals [19 0615]   BP Pulse Resp Temp SpO2   (!) 182/92 106 16 98.2 °F (36.8 °C) 100 %      MAP       --         Physical Exam    Constitutional: He appears well-developed and well-nourished. He is not diaphoretic.  Non-toxic appearance. He does not appear ill. No distress.   HENT:   Head: Normocephalic and atraumatic.   Right Ear: External ear normal.   Left Ear: External ear normal.   Nose: Nose normal.   Mouth/Throat: Oropharynx is clear and moist.   Eyes: Conjunctivae and EOM are normal. Pupils are equal, round, and reactive to light. Right eye exhibits no discharge. Left eye exhibits no discharge. No scleral icterus.   Neck: Normal range of motion. Neck supple. No tracheal deviation present. No JVD present.   Cardiovascular: Regular rhythm, normal heart sounds and intact distal pulses. Tachycardia present.  Exam reveals no gallop and no  friction rub.    No murmur heard.  Pulmonary/Chest: Breath sounds normal. No accessory muscle usage or stridor. No tachypnea. No respiratory distress. He has no wheezes. He has no rhonchi. He has no rales. He exhibits no tenderness.   Abdominal: Soft. Bowel sounds are normal. He exhibits no distension and no mass. There is no tenderness. There is no rebound and no guarding.   Genitourinary: Penis normal.   Musculoskeletal: Normal range of motion. He exhibits no edema or tenderness.        Right lower leg: Normal.        Left lower leg: Normal.   Lymphadenopathy:     He has no cervical adenopathy.   Neurological: He is alert and oriented to person, place, and time. He has normal strength and normal reflexes. No cranial nerve deficit or sensory deficit. GCS score is 15. GCS eye subscore is 4. GCS verbal subscore is 5. GCS motor subscore is 6.   Skin: Skin is warm and dry. Capillary refill takes less than 2 seconds. No rash noted. No erythema. No pallor.   Psychiatric: He has a normal mood and affect. His behavior is normal. Judgment and thought content normal.         ED Course   Procedures  Labs Reviewed   CBC W/ AUTO DIFFERENTIAL   COMPREHENSIVE METABOLIC PANEL   TROPONIN I   B-TYPE NATRIURETIC PEPTIDE   D DIMER, QUANTITATIVE          Imaging Results    None                       Attending Attestation:             Attending ED Notes:   A 5-year-old male whose presented with left precordial chest tightness, has no acute EKG changes.  His initial cardiac enzymes are normal.  During the ED course the patient had received aspirin both in the pre-hospital setting as well as further aspirin in the ED.  Topical nitrates did give him some relief of pain.  Patient's D-dimer was normal.  In light of his presenting symptoms and the fact that he felt better after nitrates, hospital medicine is being consulted for admission.  Differential diagnosis includes angina, pleurisy, pulmonary embolus, pericarditis, dissection,  myocardial infarction.                        Clinical Impression:       ICD-10-CM ICD-9-CM   1. Chest pain R07.9 786.50                             Anderson lCaros Jr., MD  11/19/19 1210

## 2022-01-28 ENCOUNTER — PATIENT MESSAGE (OUTPATIENT)
Dept: INFECTIOUS DISEASES | Facility: CLINIC | Age: 62
End: 2022-01-28
Payer: COMMERCIAL

## 2022-02-09 ENCOUNTER — PATIENT MESSAGE (OUTPATIENT)
Dept: ADMINISTRATIVE | Facility: OTHER | Age: 62
End: 2022-02-09
Payer: COMMERCIAL

## 2022-03-27 DIAGNOSIS — B20 HUMAN IMMUNODEFICIENCY VIRUS (HIV) DISEASE: ICD-10-CM

## 2022-03-27 RX ORDER — EFAVIRENZ 600 MG/1
600 TABLET, FILM COATED ORAL NIGHTLY
Qty: 30 TABLET | Refills: 11 | Status: CANCELLED | OUTPATIENT
Start: 2022-03-27 | End: 2023-03-27

## 2022-03-28 RX ORDER — EFAVIRENZ 600 MG/1
600 TABLET, FILM COATED ORAL NIGHTLY
Qty: 90 TABLET | Refills: 1 | Status: SHIPPED | OUTPATIENT
Start: 2022-03-28 | End: 2022-09-01 | Stop reason: SDUPTHER

## 2022-05-16 DIAGNOSIS — B20 HIV INFECTION, UNSPECIFIED SYMPTOM STATUS: Primary | Chronic | ICD-10-CM

## 2022-05-16 RX ORDER — EMTRICITABINE AND TENOFOVIR ALAFENAMIDE 200; 25 MG/1; MG/1
1 TABLET ORAL DAILY
Qty: 90 TABLET | Refills: 2 | Status: SHIPPED | OUTPATIENT
Start: 2022-05-16 | End: 2023-01-09 | Stop reason: SDUPTHER

## 2022-05-31 RX ORDER — ESCITALOPRAM OXALATE 10 MG/1
10 TABLET ORAL DAILY
Qty: 90 TABLET | Refills: 3 | Status: SHIPPED | OUTPATIENT
Start: 2022-05-31 | End: 2023-01-09 | Stop reason: SDUPTHER

## 2022-06-02 ENCOUNTER — IMMUNIZATION (OUTPATIENT)
Dept: PRIMARY CARE CLINIC | Facility: CLINIC | Age: 62
End: 2022-06-02
Payer: COMMERCIAL

## 2022-06-02 DIAGNOSIS — Z23 NEED FOR VACCINATION: Primary | ICD-10-CM

## 2022-06-02 PROCEDURE — 0054A PR IMMUNIZ ADMIN, SARS-COV-2 COVID-19 VACC, TRI SUCROSE, 30MCG/0.3ML, BOOSTER DOSE: ICD-10-PCS | Mod: S$GLB,,, | Performed by: FAMILY MEDICINE

## 2022-06-02 PROCEDURE — 91305 COVID-19, MRNA, LNP-S, PF, 30 MCG/0.3 ML DOSE VACCINE (PFIZER): CPT | Mod: S$GLB,,, | Performed by: FAMILY MEDICINE

## 2022-06-02 PROCEDURE — 91305 COVID-19, MRNA, LNP-S, PF, 30 MCG/0.3 ML DOSE VACCINE (PFIZER): ICD-10-PCS | Mod: S$GLB,,, | Performed by: FAMILY MEDICINE

## 2022-06-02 PROCEDURE — 0054A PR IMMUNIZ ADMIN, SARS-COV-2 COVID-19 VACC, TRI SUCROSE, 30MCG/0.3ML, BOOSTER DOSE: CPT | Mod: S$GLB,,, | Performed by: FAMILY MEDICINE

## 2022-06-27 ENCOUNTER — OFFICE VISIT (OUTPATIENT)
Dept: INFECTIOUS DISEASES | Facility: CLINIC | Age: 62
End: 2022-06-27
Payer: COMMERCIAL

## 2022-06-27 VITALS
WEIGHT: 149.19 LBS | DIASTOLIC BLOOD PRESSURE: 64 MMHG | SYSTOLIC BLOOD PRESSURE: 126 MMHG | HEART RATE: 100 BPM | HEIGHT: 66 IN | OXYGEN SATURATION: 98 % | TEMPERATURE: 99 F | BODY MASS INDEX: 23.98 KG/M2

## 2022-06-27 DIAGNOSIS — E78.00 HIGH CHOLESTEROL: ICD-10-CM

## 2022-06-27 DIAGNOSIS — B20 LIPODYSTROPHY DUE TO HIV INFECTION AND ANTIRETROVIRAL THERAPY: ICD-10-CM

## 2022-06-27 DIAGNOSIS — C61 PROSTATE CANCER: ICD-10-CM

## 2022-06-27 DIAGNOSIS — E88.1 LIPODYSTROPHY DUE TO HIV INFECTION AND ANTIRETROVIRAL THERAPY: ICD-10-CM

## 2022-06-27 DIAGNOSIS — Z79.899 LIPODYSTROPHY DUE TO HIV INFECTION AND ANTIRETROVIRAL THERAPY: ICD-10-CM

## 2022-06-27 DIAGNOSIS — Z71.89 EDUCATED ABOUT COVID-19 VIRUS INFECTION: ICD-10-CM

## 2022-06-27 DIAGNOSIS — F17.200 SMOKER: ICD-10-CM

## 2022-06-27 DIAGNOSIS — B20 HUMAN IMMUNODEFICIENCY VIRUS (HIV) DISEASE: Primary | ICD-10-CM

## 2022-06-27 PROCEDURE — 99214 OFFICE O/P EST MOD 30 MIN: CPT | Mod: S$GLB,,, | Performed by: INTERNAL MEDICINE

## 2022-06-27 PROCEDURE — 3078F PR MOST RECENT DIASTOLIC BLOOD PRESSURE < 80 MM HG: ICD-10-PCS | Mod: CPTII,S$GLB,, | Performed by: INTERNAL MEDICINE

## 2022-06-27 PROCEDURE — 3008F PR BODY MASS INDEX (BMI) DOCUMENTED: ICD-10-PCS | Mod: CPTII,S$GLB,, | Performed by: INTERNAL MEDICINE

## 2022-06-27 PROCEDURE — 3078F DIAST BP <80 MM HG: CPT | Mod: CPTII,S$GLB,, | Performed by: INTERNAL MEDICINE

## 2022-06-27 PROCEDURE — 1159F PR MEDICATION LIST DOCUMENTED IN MEDICAL RECORD: ICD-10-PCS | Mod: CPTII,S$GLB,, | Performed by: INTERNAL MEDICINE

## 2022-06-27 PROCEDURE — 3074F SYST BP LT 130 MM HG: CPT | Mod: CPTII,S$GLB,, | Performed by: INTERNAL MEDICINE

## 2022-06-27 PROCEDURE — 3008F BODY MASS INDEX DOCD: CPT | Mod: CPTII,S$GLB,, | Performed by: INTERNAL MEDICINE

## 2022-06-27 PROCEDURE — 1159F MED LIST DOCD IN RCRD: CPT | Mod: CPTII,S$GLB,, | Performed by: INTERNAL MEDICINE

## 2022-06-27 PROCEDURE — 99214 PR OFFICE/OUTPT VISIT, EST, LEVL IV, 30-39 MIN: ICD-10-PCS | Mod: S$GLB,,, | Performed by: INTERNAL MEDICINE

## 2022-06-27 PROCEDURE — 1160F PR REVIEW ALL MEDS BY PRESCRIBER/CLIN PHARMACIST DOCUMENTED: ICD-10-PCS | Mod: CPTII,S$GLB,, | Performed by: INTERNAL MEDICINE

## 2022-06-27 PROCEDURE — 1160F RVW MEDS BY RX/DR IN RCRD: CPT | Mod: CPTII,S$GLB,, | Performed by: INTERNAL MEDICINE

## 2022-06-27 PROCEDURE — 3074F PR MOST RECENT SYSTOLIC BLOOD PRESSURE < 130 MM HG: ICD-10-PCS | Mod: CPTII,S$GLB,, | Performed by: INTERNAL MEDICINE

## 2022-06-27 RX ORDER — PRAVASTATIN SODIUM 40 MG/1
40 TABLET ORAL DAILY
Qty: 90 TABLET | Refills: 2 | Status: SHIPPED | OUTPATIENT
Start: 2022-06-27 | End: 2022-06-30 | Stop reason: SDUPTHER

## 2022-06-27 NOTE — PATIENT INSTRUCTIONS
Same medications  Lab tomorrow    Smoking cessation referral given    Flu vaccine in the fall    Return in december

## 2022-06-27 NOTE — PROGRESS NOTES
Subjective:       Patient ID: Jones Germain is a 62 y.o. male.    Chief Complaint:: Follow-up and HIV Positive/AIDS    Follow-up    HIV Positive/AIDS    Discussed prostate cancer diagnosis and options, admits depression. Concerned about requiring catheter, being incontinent, radiation adverse effects. He is planning to do the surgery before summer break so that he will have time to recover.  Did receive his flu vaccine. Converted to generic atripla  Compliant with meds. Still smoking. Has not yet established with primary care. Discussed that he will need clearance for surgery and anesthesia    6/27/19: he was seen by Dr. De La Torre for cardiac clearance. On 5/29 he had robotic prostatectomy, received bactrim post op and had jones for 2 weeks. Requiring no pain meds but has sensitivity over his midline abdominal incision. Tells me he will not require any additional treatment. He feels like he is emptying his bladder well. Stream is good. Rare leak. He stopped his Atripla for a week after the surgery. Very frustrated with the cost of his care and very limited in his finances. He isolates himself    12/30/19: reviewed recent hospitalization. He went in for chest pain, had a negative experience, had a stress echo which was negative and CT chest and calcium score. 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 He left AMA for personal reasons. He Has reduced cig to 1/4 ppd. Very stressed from work related issues, cost of medical care. Had a diarrheal illness for 2 weeks after that admission and [facility]. He has lots of anxiety, frustration, anger, depression underlying and prior to these events. He did not feel that the wellbutrin did much good. He is also using a nicotine patch which may be too strong for the 1/4 pack he is smoking now. His BP is high as well as pulse. He states that he measures his blood pressure at home from time to time and its ok. Discussed that HTN can make the aorta more dilated and  risk an aneurysm forming. He did not have viralload in July as requested.     6/15/20: he is protecting himself from COVID. He is socializing very little.  lexapro has helped him a great deal. He started doing yoga and he enjoys this.  He has not seen cardiologist yet.     12/30/20: since last visit, he had a fall and radiculopathy consistent with sciatica. He did have benefit from gabapentin and meloxicam but ran out. He is concerned about going back to school and COVID risk. He has not yet seen cardiology for follow up on aortic abnormality    6/28/21: sciatica is almost well, no longer gabapentin or meloxicam. Adherent to descovy/sustiva. Had to replace his roof, adding financial stress. He has not pursued cardiology eval.     12/29/21: since last visit, he had to have cysto x2 to remove bladder stones and repair a stricture. His nocturia is resolved. No incontinence any longer. He will have echo to screen thoracic aorta in July. Did have COVID booster and has not yet received influenza vaccine.   Occasional flare of sciatica. Does not feel that he needs PT. He scrubbed his kitchen floor yesterday.     6/27/22: he took a second booster for COVID. reviewed interim. Seeing cardiology and urology.    Now taking a MVI, fish oil, Mg, K, B12, zinc and he feels better. 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     Current Outpatient Medications:     aspirin 81 MG Chew, Take 1 tablet (81 mg total) by mouth once daily., Disp: 30 tablet, Rfl: 1    efavirenz (SUSTIVA) 600 mg Tab, Take 1 tablet (600 mg total) by mouth every evening., Disp: 30 tablet, Rfl: 11    efavirenz (SUSTIVA) 600 mg Tab, Take 1 tablet (600 mg total) by mouth every evening., Disp: 90 tablet, Rfl: 1    emtricitabine-tenofovir alafen (DESCOVY) 200-25 mg Tab, Take 1 tablet by mouth once daily., Disp: 90 tablet, Rfl: 2    EScitalopram oxalate (LEXAPRO) 10 MG tablet, Take 1 tablet (10 mg total) by mouth once daily., Disp: 90 tablet, Rfl: 3     nitroGLYCERIN (NITROSTAT) 0.4 MG SL tablet, If you have chest pain, place 1 tablet under tongue every 5 min as needed for 3 doses.  Then call your doctor afterwards, Disp: 15 tablet, Rfl: 0    gabapentin (NEURONTIN) 300 MG capsule, Take 1 capsule (300 mg total) by mouth 3 (three) times daily. (Patient not taking: No sig reported), Disp: 90 capsule, Rfl: 11    pravastatin (PRAVACHOL) 40 MG tablet, Take 1 tablet (40 mg total) by mouth once daily., Disp: 90 tablet, Rfl: 2  Review of patient's allergies indicates:  No Known Allergies  Past Medical History:   Diagnosis Date    Adverse effect of hepatitis B vaccine     non responder    Hepatitis A 1991    High frequency hearing loss     HIV (human immunodeficiency virus infection) 1995    On COM/cRix until kidney stones, sallie 200    Hypercholesterolemia     Hyperlipidemia     Kidney stone 2005    [facility], Lithotripsy    Lipoatrophy     Mild    Pertussis     As a child    Prostate cancer     prostate    Secondary syphilis 2002    Smoker     Vertigo 02/2018    Due to Ear Infection     Past Surgical History:   Procedure Laterality Date    CYSTOSCOPY N/A 8/17/2021    Procedure: CYSTOSCOPY;  Surgeon: Baljinder Johnson MD;  Location: [facility] OR;  Service: Urology;  Laterality: N/A;  unable to pass through bladder neck    CYSTOURETHROSCOPY WITH DIRECT VISION INTERNAL URETHROTOMY N/A 9/9/2021    Procedure: CYSTOSCOPY, WITH DIRECT VISION INTERNAL URETHROTOMY of bladder neck, possible dilation;  Surgeon: Baljinder Johnson MD;  Location: [facility] OR;  Service: Urology;  Laterality: N/A;    LITHOTRIPSY      ROBOT-ASSISTED LAPAROSCOPIC PROSTATECTOMY N/A 5/29/2019    Procedure: ROBOTIC PROSTATECTOMY;  Surgeon: Baljinder Johnson MD;  Location: [facility] OR;  Service: Urology;  Laterality: N/A;    TRANSRECTAL BIOPSY OF PROSTATE WITH ULTRASOUND GUIDANCE N/A 9/25/2018    Procedure: BIOPSY, PROSTATE, RECTAL APPROACH, WITH US GUIDANCE;  Surgeon: Baljinder Johnson MD;  Location: [facility]  OR;  Service: Urology;  Laterality: N/A;     Social History     Socioeconomic History    Marital status: Single   Occupational History    Occupation: Teacher   Tobacco Use    Smoking status: Current Every Day Smoker     Packs/day: 1.00     Types: Cigarettes    Smokeless tobacco: Never Used    Tobacco comment: 20 to 30   Substance and Sexual Activity    Alcohol use: No    Drug use: No    Sexual activity: Never     Comment: Abstinent     Family History   Problem Relation Age of Onset    Diabetes Mother          in a MVC    COPD Father     Lung cancer Paternal Grandmother        Travel History:   Vaccine History: Yearly flu vaccine, Pneumovax , , Prevnar , tetanus , Td AP , Zostavax 2016, hepatitis B ×3 , shingrix x 2 , COVID x 3 ,  menactra , 2021    Advanced Directive:   Safer Sex: Abstinent  Bone Density: 2011 osteopenia  Colonoscopy: ()    Review of Systems    Constitutional: No fever, chills, sweats, fatigue, weakness,        Eyes: No change in vision, loss of vision,  . No visit to eye doctor for a couple of years    ENT: No sinus drainage, sore throat, mouth pain, or lesions. UTD with dentist    Cardiovascular: No chest pain, GAVIN, palpitations or pedal edema    Respiratory: No shortness of breath, GAVIN, cough, wheeze, sputum, pleurisy, or hemoptysis. Still smoking 1/2 pack per day       Gastrointestinal: No abdominal pain, nausea, vomiting, diarrhea,  or focal abd pain    Genitourinary: No dysuria, hematuria, incontinence, frequency, flank pain,   Has ED    Musculoskeletal: see HPI. Still has some sciatic discomfort. No longer taking gabapentin    Integumentary: No new rashes, lesions,   Neurological: No dizziness, vertigo, unusual headaches,     Psychiatric: he is pleased with lexapro,      Endocrine: No diabetes Thyroid normal    Lymphatic: No lymphadenopathy, blood loss, anemia,     Objective:      Blood pressure 126/64, pulse 100,  "temperature 98.5 °F (36.9 °C), height 5' 6" (1.676 m), weight 67.7 kg (149 lb 3.2 oz), SpO2 98 %. Body mass index is 24.08 kg/m².  Physical Exam   Repeat was 126/80    General: Alert and attentive, cooperative  .    Eyes: Pupils equal, round, reactive to light, anicteric, EOMI    Neck: Supple, non-tender, no thyromegaly or masses    ENT: EAC patent, TM normal, nares patent, no oral lesions, teeth in fair condition, no thrush    Cardiovascular: Regular rate and rhythm, no murmurs, rubs, or gallop    Respiratory: Lungs clear without wheezes, no rales, rub or rhonci    Gastrointestinal: Active bowel sounds, soft, no mass or organomegaly, no tenderness or distention    Genitourinary: No flank tenderness    Vascular: No peripheral edema, phlebitis, pulses normal. Warm and well perfused, DP/PT/Pop pulses palpable bilaterally    Musculoskeletal: Ambulates without difficulty , no acute arthritis, synovitis or myositis. Normal muscle bulk and strength     Integumentary: Skin without rashes, lesions,     AnusRectum: per urology    Neurological: Normal LOC, cranial nerves, speech, reflexes, normal gait    Psychiatric: usual demeanor    Lymphatic: No cervical, supraclavicular, axillary, or inguinal lymphadenopathy      Wound:      HIV Table:   HLA-B 5701     TOXOIgG     RPR 7/2021 non reactive   HEP A IgG 6/8/2015 infection/immune   HBsAg 6/8/2015 negative   HBsAb 12/3/2016 negative, non responder   HBcAb     HBeAb     HBeAg     HCVAb 7/8/2018  negative   HBV DNA     HCV RNA     Vitamin D 1/20/2018 40   Chlamydia / GC 12/5/2014 negative   TB Gold  12/2020  negative  PSA   7/23/21 undetectable  HIV RNA  1/2021  <20  COVID IgG   6/2020  negative    Recent Diagnostics: Reviewed 2021       Assessment and Plan:           Human immunodeficiency virus (HIV) disease  -     CBC Auto Differential; Future; Expected date: 06/27/2022  -     Comprehensive Metabolic Panel; Future; Expected date: 06/27/2022  -     HIV RNA, Quantitative, PCR; " Future; Expected date: 06/27/2022  -     QuantiFERON-TB Gold Plus; Future; Expected date: 06/27/2022  -     Hepatitis C Antibody; Future; Expected date: 06/27/2022  -     pravastatin (PRAVACHOL) 40 MG tablet; Take 1 tablet (40 mg total) by mouth once daily.  Dispense: 90 tablet; Refill: 2    High cholesterol  -     Lipid Panel; Future; Expected date: 06/27/2022  -     pravastatin (PRAVACHOL) 40 MG tablet; Take 1 tablet (40 mg total) by mouth once daily.  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

## 2022-06-30 DIAGNOSIS — E78.00 HIGH CHOLESTEROL: ICD-10-CM

## 2022-06-30 DIAGNOSIS — B20 HUMAN IMMUNODEFICIENCY VIRUS (HIV) DISEASE: ICD-10-CM

## 2022-06-30 RX ORDER — PRAVASTATIN SODIUM 40 MG/1
40 TABLET ORAL DAILY
Qty: 90 TABLET | Refills: 2 | Status: SHIPPED | OUTPATIENT
Start: 2022-06-30 | End: 2022-07-19 | Stop reason: ALTCHOICE

## 2022-07-03 LAB
ALBUMIN SERPL-MCNC: 5 G/DL (ref 3.8–4.8)
ALBUMIN/GLOB SERPL: 2.6 {RATIO} (ref 1.2–2.2)
ALP SERPL-CCNC: 109 IU/L (ref 44–121)
ALT SERPL-CCNC: 18 IU/L (ref 0–44)
AST SERPL-CCNC: 16 IU/L (ref 0–40)
BASOPHILS # BLD AUTO: 0 X10E3/UL (ref 0–0.2)
BASOPHILS NFR BLD AUTO: 1 %
BILIRUB SERPL-MCNC: 0.3 MG/DL (ref 0–1.2)
BUN SERPL-MCNC: 8 MG/DL (ref 8–27)
BUN/CREAT SERPL: 8 (ref 10–24)
CALCIUM SERPL-MCNC: 9.2 MG/DL (ref 8.6–10.2)
CHLORIDE SERPL-SCNC: 101 MMOL/L (ref 96–106)
CHOLEST SERPL-MCNC: 194 MG/DL (ref 100–199)
CO2 SERPL-SCNC: 24 MMOL/L (ref 20–29)
CREAT SERPL-MCNC: 0.96 MG/DL (ref 0.76–1.27)
EOSINOPHIL # BLD AUTO: 0.1 X10E3/UL (ref 0–0.4)
EOSINOPHIL NFR BLD AUTO: 2 %
ERYTHROCYTE [DISTWIDTH] IN BLOOD BY AUTOMATED COUNT: 12.6 % (ref 11.6–15.4)
EST. GFR  (NO RACE VARIABLE): 89 ML/MIN/1.73
GAMMA INTERFERON BACKGROUND BLD IA-ACNC: 0.01 IU/ML
GLOBULIN SER CALC-MCNC: 1.9 G/DL (ref 1.5–4.5)
GLUCOSE SERPL-MCNC: 98 MG/DL (ref 65–99)
HCT VFR BLD AUTO: 48 % (ref 37.5–51)
HCV AB S/CO SERPL IA: <0.1 S/CO RATIO (ref 0–0.9)
HDLC SERPL-MCNC: 31 MG/DL
HGB BLD-MCNC: 16 G/DL (ref 13–17.7)
HIV1 RNA # SERPL NAA+PROBE: <20 COPIES/ML
HIV1 RNA SERPL NAA+PROBE-LOG#: NORMAL LOG10COPY/ML
IMM GRANULOCYTES # BLD AUTO: 0 X10E3/UL (ref 0–0.1)
IMM GRANULOCYTES NFR BLD AUTO: 0 %
LDLC SERPL CALC-MCNC: 124 MG/DL (ref 0–99)
LYMPHOCYTES # BLD AUTO: 1.4 X10E3/UL (ref 0.7–3.1)
LYMPHOCYTES NFR BLD AUTO: 28 %
M TB IFN-G BLD-IMP: NEGATIVE
M TB IFN-G CD4+ BCKGRND COR BLD-ACNC: 0.01 IU/ML
M TB IFN-G CD4+CD8+ BCKGRND COR BLD-ACNC: 0.02 IU/ML
MCH RBC QN AUTO: 32.3 PG (ref 26.6–33)
MCHC RBC AUTO-ENTMCNC: 33.3 G/DL (ref 31.5–35.7)
MCV RBC AUTO: 97 FL (ref 79–97)
MITOGEN IGNF BLD-ACNC: >10 IU/ML
MONOCYTES # BLD AUTO: 0.4 X10E3/UL (ref 0.1–0.9)
MONOCYTES NFR BLD AUTO: 8 %
NEUTROPHILS # BLD AUTO: 3.1 X10E3/UL (ref 1.4–7)
NEUTROPHILS NFR BLD AUTO: 61 %
PLATELET # BLD AUTO: 248 X10E3/UL (ref 150–450)
POTASSIUM SERPL-SCNC: 4.2 MMOL/L (ref 3.5–5.2)
PROT SERPL-MCNC: 6.9 G/DL (ref 6–8.5)
QUANTIFERON TB GOLD (INCUBATED): NORMAL
RBC # BLD AUTO: 4.95 X10E6/UL (ref 4.14–5.8)
SERVICE CMNT-IMP: NORMAL
SODIUM SERPL-SCNC: 140 MMOL/L (ref 134–144)
TRIGL SERPL-MCNC: 221 MG/DL (ref 0–149)
VLDLC SERPL CALC-MCNC: 39 MG/DL (ref 5–40)
WBC # BLD AUTO: 5.1 X10E3/UL (ref 3.4–10.8)

## 2022-07-05 ENCOUNTER — LAB VISIT (OUTPATIENT)
Dept: LAB | Facility: HOSPITAL | Age: 62
End: 2022-07-05
Attending: UROLOGY
Payer: COMMERCIAL

## 2022-07-05 DIAGNOSIS — Z85.46 HISTORY OF PROSTATE CANCER: ICD-10-CM

## 2022-07-05 LAB — COMPLEXED PSA SERPL-MCNC: <0.01 NG/ML (ref 0–4)

## 2022-07-05 PROCEDURE — 84153 ASSAY OF PSA TOTAL: CPT | Performed by: UROLOGY

## 2022-07-05 PROCEDURE — 36415 COLL VENOUS BLD VENIPUNCTURE: CPT | Performed by: UROLOGY

## 2022-07-12 ENCOUNTER — OFFICE VISIT (OUTPATIENT)
Dept: UROLOGY | Facility: CLINIC | Age: 62
End: 2022-07-12
Payer: COMMERCIAL

## 2022-07-12 VITALS
DIASTOLIC BLOOD PRESSURE: 87 MMHG | SYSTOLIC BLOOD PRESSURE: 145 MMHG | WEIGHT: 149 LBS | BODY MASS INDEX: 23.95 KG/M2 | HEIGHT: 66 IN | HEART RATE: 98 BPM | RESPIRATION RATE: 18 BRPM

## 2022-07-12 DIAGNOSIS — N20.0 KIDNEY STONES: ICD-10-CM

## 2022-07-12 DIAGNOSIS — N32.0 BLADDER NECK CONTRACTURE: ICD-10-CM

## 2022-07-12 DIAGNOSIS — Z85.46 HISTORY OF PROSTATE CANCER: Primary | ICD-10-CM

## 2022-07-12 LAB
BILIRUB SERPL-MCNC: ABNORMAL MG/DL
BLOOD URINE, POC: ABNORMAL
CLARITY, POC UA: CLEAR
COLOR, POC UA: YELLOW
GLUCOSE UR QL STRIP: ABNORMAL
KETONES UR QL STRIP: ABNORMAL
LEUKOCYTE ESTERASE URINE, POC: ABNORMAL
NITRITE, POC UA: ABNORMAL
PH, POC UA: 6
PROTEIN, POC: ABNORMAL
SPECIFIC GRAVITY, POC UA: 1.02
UROBILINOGEN, POC UA: 0.2

## 2022-07-12 PROCEDURE — 99999 PR PBB SHADOW E&M-EST. PATIENT-LVL III: CPT | Mod: PBBFAC,,, | Performed by: UROLOGY

## 2022-07-12 PROCEDURE — 3008F PR BODY MASS INDEX (BMI) DOCUMENTED: ICD-10-PCS | Mod: CPTII,S$GLB,, | Performed by: UROLOGY

## 2022-07-12 PROCEDURE — 1160F PR REVIEW ALL MEDS BY PRESCRIBER/CLIN PHARMACIST DOCUMENTED: ICD-10-PCS | Mod: CPTII,S$GLB,, | Performed by: UROLOGY

## 2022-07-12 PROCEDURE — 3077F PR MOST RECENT SYSTOLIC BLOOD PRESSURE >= 140 MM HG: ICD-10-PCS | Mod: CPTII,S$GLB,, | Performed by: UROLOGY

## 2022-07-12 PROCEDURE — 3079F PR MOST RECENT DIASTOLIC BLOOD PRESSURE 80-89 MM HG: ICD-10-PCS | Mod: CPTII,S$GLB,, | Performed by: UROLOGY

## 2022-07-12 PROCEDURE — 1159F PR MEDICATION LIST DOCUMENTED IN MEDICAL RECORD: ICD-10-PCS | Mod: CPTII,S$GLB,, | Performed by: UROLOGY

## 2022-07-12 PROCEDURE — 99999 PR PBB SHADOW E&M-EST. PATIENT-LVL III: ICD-10-PCS | Mod: PBBFAC,,, | Performed by: UROLOGY

## 2022-07-12 PROCEDURE — 99214 PR OFFICE/OUTPT VISIT, EST, LEVL IV, 30-39 MIN: ICD-10-PCS | Mod: S$GLB,,, | Performed by: UROLOGY

## 2022-07-12 PROCEDURE — 1159F MED LIST DOCD IN RCRD: CPT | Mod: CPTII,S$GLB,, | Performed by: UROLOGY

## 2022-07-12 PROCEDURE — 3079F DIAST BP 80-89 MM HG: CPT | Mod: CPTII,S$GLB,, | Performed by: UROLOGY

## 2022-07-12 PROCEDURE — 81002 URINALYSIS NONAUTO W/O SCOPE: CPT | Mod: S$GLB,,, | Performed by: UROLOGY

## 2022-07-12 PROCEDURE — 99214 OFFICE O/P EST MOD 30 MIN: CPT | Mod: S$GLB,,, | Performed by: UROLOGY

## 2022-07-12 PROCEDURE — 1160F RVW MEDS BY RX/DR IN RCRD: CPT | Mod: CPTII,S$GLB,, | Performed by: UROLOGY

## 2022-07-12 PROCEDURE — 3077F SYST BP >= 140 MM HG: CPT | Mod: CPTII,S$GLB,, | Performed by: UROLOGY

## 2022-07-12 PROCEDURE — 3008F BODY MASS INDEX DOCD: CPT | Mod: CPTII,S$GLB,, | Performed by: UROLOGY

## 2022-07-12 PROCEDURE — 81002 POCT URINE DIPSTICK WITHOUT MICROSCOPE: ICD-10-PCS | Mod: S$GLB,,, | Performed by: UROLOGY

## 2022-07-12 NOTE — PROGRESS NOTES
[facility] Urology Progress Note    Jones Germain is a 62 y.o. male who presents for prostate ca follow up, with history [facility]    Prostate biopsy 9/2018 revealing a 57.3g gland with 5/15 cores aiden 6 prostate cancer, largely left sided, with small volume on right. Diagnosed with L prostate nodule and psa 2.0. He initially elected surveillancebut 6 months later had interval psa rise to 2.7 and progression of his LUTS with AUA SS 17/5. He elected to proceed with robotic assisted radical prostatectomy     5/29/19: Robot-assisted laparoscopic radical prostatectomy, right nerve sparing. Bilateral pelvic lymphadenectomy. Significant median lobe with BN reconstruction  PATHOLOGY: [id number] aiden 3+3 negative margin    By 4 mos postop was just wearing pad for safety but no sig IMMANUEL and continued smoking. PSA has been undetectable postop. By 7/2020 no leakage no pads, occ slow stream occ urgency.  Udips continued to have blood, though UA micro only had 1rbc in 7/2020, but CaOx crystalluria so got KUB. Hx stones last episode in 2005  KUB 7/2020: multiple small 1-2 mm and a 4 mm calcification overlying the left kidney consistent with intrarenal stones.  There is at least 1 right intrarenal stone identified.  Calcifications in the expected course of either ureter is not seen. He deferred CT in favor of adhering to stone prevention recs and repeating KUB at time of 6 mos psa and following up with NP    KUB 1/21: There are several stones in the left mid abdomen, at least 3 in number with largest 5 mm.  He saw NP after that Xray, at which time psa undetectable, in Jan 2021 noting UA with mod blood this time. Advised CT and possible cysto if true MH and UA micro had 75 rbc/hpf though no further workup done. UA micro weeks before that with 4 rbc.     8/2021: PSA <0.01, Still smoking about 1/2 ppd and smoking since age 17, Still continent, no pads no leakage. Still no erectile function and ok with it., no GH but +MH and CT with small  "bilat kidney stones and a few small bladder stones. Suspicious cytology. Cysto dxed NC    9/9/21: Cystoscopy with release of bladder neck contracture via direct vision internal urethrotomy and removal multiple bladder stones  11/22/21 uroflow:  no significant intermittency.  Equivocal for obstruction but no intermittent spikes which would be concerning for early stricture or bladder neck contracture recurrence.   He reports no bothersome voiding symptoms and empties well    He returns today noting:  No trouble voiding.  No weak stream.  No hematuria.  No incontinence.  Nocturia x1 but if does not drink at night, times 0.  Trace blood on urine dipstick persists.  PSA less than 0.01, undetectable on 7/5/22          Focused Physical Exam:    Vitals:    07/12/22 0829   BP: (!) 145/87   Pulse: 98   Resp: 18     Body mass index is 24.05 kg/m². Weight: 67.6 kg (149 lb) Height: 5' 6" (167.6 cm)     Abdomen: Soft, non-tender, nondistended, no CVA tenderness, lap incisions well healed      Recent Results (from the past 336 hour(s))   Prostate Specific Antigen, Diagnostic    Collection Time: 07/05/22  9:05 AM   Result Value Ref Range    PSA Diagnostic <0.01 0.00 - 4.00 ng/mL   POCT URINE DIPSTICK WITHOUT MICROSCOPE    Collection Time: 07/12/22  8:38 AM   Result Value Ref Range    Color, UA Yellow     pH, UA 6     WBC, UA neg     Nitrite, UA neg     Protein, POC neg     Glucose, UA neg     Ketones, UA neg     Urobilinogen, UA 0.2     Bilirubin, POC neg     Blood, UA trace     Clarity, UA Clear     Spec Grav UA 1.025        ASSESSMENT   1. History of prostate cancer  POCT URINE DIPSTICK WITHOUT MICROSCOPE    Prostate Specific Antigen, Diagnostic    Prostate Specific Antigen, Diagnostic   2. Bladder neck contracture  Uroflowmetry   3. Kidney stones         Plan    Again reviewed interim uroflow with absence of obstruction since release of his bladder neck contracture, and reviewed his PSA which remains undetectable with no " evidence of disease, and without any recurrent symptoms or concerns, he is otherwise doing well greater than 3 years out from robotic prostatectomy.  We discussed the importance of continued PSA monitoring every 6 months to year 5, then annually.  Would follow urinary symptoms closely given potential for recurrence of bladder neck contracture, and 1 next 6 month PSA is due, will have him present for nurse visit for uroflow/PVR as well.  He preferred to do this as close to New year's as possible stool is still out, as this was scheduled on 12/30/22.  Again reviewed all options for management of postprostatectomy ED but again states this is not a priority and mcknight not desire management at this time    Level of Medical Decision Making  [ _ ]  Low: 2 minor/1 stable chronic/1 acute uncomplicated --- review record/result/order test x2  [X ]  Mod: 1 chronic exac/2stable chronic/1 acute comp --- review record/result/order test x3 OR independent interp of outside test OR discuss mgmt of outside ordered test --- start drug therapy/plan minor surgery   [ _ ]  High: chronic with exacerbation/progression or trtmnt side effect vs acute/chronic w/ life/body threat ---  (2/3) review record/result/order test x3 OR independent interp of ouutside test OR discuss mgmt of outside ordered test --- drug with monitoring for toxicity, plan major surgery, hospitalize, deescalate/withdraw care bc of prognosis

## 2022-07-18 ENCOUNTER — PATIENT MESSAGE (OUTPATIENT)
Dept: INFECTIOUS DISEASES | Facility: HOSPITAL | Age: 62
End: 2022-07-18

## 2022-07-18 ENCOUNTER — PATIENT MESSAGE (OUTPATIENT)
Dept: INFECTIOUS DISEASES | Facility: CLINIC | Age: 62
End: 2022-07-18

## 2022-07-19 RX ORDER — ROSUVASTATIN CALCIUM 10 MG/1
10 TABLET, COATED ORAL DAILY
Qty: 90 TABLET | Refills: 3 | Status: SHIPPED | OUTPATIENT
Start: 2022-07-19 | End: 2023-01-09 | Stop reason: SDUPTHER

## 2022-07-27 ENCOUNTER — OFFICE VISIT (OUTPATIENT)
Dept: CARDIOLOGY | Facility: CLINIC | Age: 62
End: 2022-07-27
Payer: COMMERCIAL

## 2022-07-27 VITALS
HEIGHT: 66 IN | DIASTOLIC BLOOD PRESSURE: 68 MMHG | HEART RATE: 88 BPM | SYSTOLIC BLOOD PRESSURE: 130 MMHG | WEIGHT: 149.94 LBS | OXYGEN SATURATION: 96 % | BODY MASS INDEX: 24.1 KG/M2

## 2022-07-27 DIAGNOSIS — E78.2 MIXED HYPERLIPIDEMIA: ICD-10-CM

## 2022-07-27 DIAGNOSIS — B20 HIV INFECTION, UNSPECIFIED SYMPTOM STATUS: Chronic | ICD-10-CM

## 2022-07-27 DIAGNOSIS — I77.9 AORTA DISORDER: Primary | ICD-10-CM

## 2022-07-27 DIAGNOSIS — Z72.0 TOBACCO ABUSE: ICD-10-CM

## 2022-07-27 PROCEDURE — 99204 OFFICE O/P NEW MOD 45 MIN: CPT | Mod: S$GLB,,, | Performed by: INTERNAL MEDICINE

## 2022-07-27 PROCEDURE — 3008F PR BODY MASS INDEX (BMI) DOCUMENTED: ICD-10-PCS | Mod: CPTII,S$GLB,, | Performed by: INTERNAL MEDICINE

## 2022-07-27 PROCEDURE — 3075F PR MOST RECENT SYSTOLIC BLOOD PRESS GE 130-139MM HG: ICD-10-PCS | Mod: CPTII,S$GLB,, | Performed by: INTERNAL MEDICINE

## 2022-07-27 PROCEDURE — 1159F PR MEDICATION LIST DOCUMENTED IN MEDICAL RECORD: ICD-10-PCS | Mod: CPTII,S$GLB,, | Performed by: INTERNAL MEDICINE

## 2022-07-27 PROCEDURE — 93000 ELECTROCARDIOGRAM COMPLETE: CPT | Mod: S$GLB,,, | Performed by: INTERNAL MEDICINE

## 2022-07-27 PROCEDURE — 99204 PR OFFICE/OUTPT VISIT, NEW, LEVL IV, 45-59 MIN: ICD-10-PCS | Mod: S$GLB,,, | Performed by: INTERNAL MEDICINE

## 2022-07-27 PROCEDURE — 1160F RVW MEDS BY RX/DR IN RCRD: CPT | Mod: CPTII,S$GLB,, | Performed by: INTERNAL MEDICINE

## 2022-07-27 PROCEDURE — 93000 EKG 12-LEAD: ICD-10-PCS | Mod: S$GLB,,, | Performed by: INTERNAL MEDICINE

## 2022-07-27 PROCEDURE — 3008F BODY MASS INDEX DOCD: CPT | Mod: CPTII,S$GLB,, | Performed by: INTERNAL MEDICINE

## 2022-07-27 PROCEDURE — 3078F DIAST BP <80 MM HG: CPT | Mod: CPTII,S$GLB,, | Performed by: INTERNAL MEDICINE

## 2022-07-27 PROCEDURE — 1159F MED LIST DOCD IN RCRD: CPT | Mod: CPTII,S$GLB,, | Performed by: INTERNAL MEDICINE

## 2022-07-27 PROCEDURE — 3075F SYST BP GE 130 - 139MM HG: CPT | Mod: CPTII,S$GLB,, | Performed by: INTERNAL MEDICINE

## 2022-07-27 PROCEDURE — 3078F PR MOST RECENT DIASTOLIC BLOOD PRESSURE < 80 MM HG: ICD-10-PCS | Mod: CPTII,S$GLB,, | Performed by: INTERNAL MEDICINE

## 2022-07-27 PROCEDURE — 1160F PR REVIEW ALL MEDS BY PRESCRIBER/CLIN PHARMACIST DOCUMENTED: ICD-10-PCS | Mod: CPTII,S$GLB,, | Performed by: INTERNAL MEDICINE

## 2022-07-27 NOTE — PROGRESS NOTES
[facility] - Cardiology    Subjective:     Patient ID:  Jones Germain is a 62 y.o. male patient here for evaluation Follow-up (1 Year )      HPI:  62-year-old male here for follow-up.  Patient had 1 episode of chest pain is in 2019 for which a stress echo was normal.  Patient has been doing well since then.  He also has a history of mild aortic ectasia which was 3.9 cm CT in the past and is now 3.7 cm. He also has a history of tobacco abuse and is HIV positive.  He is extremely active at home without any episodes of chest pain, shortness of breath, palpitations, dizziness lightheadedness.    Review of Systems   All other systems reviewed and are negative.       Past Medical History:   Diagnosis Date    Adverse effect of hepatitis B vaccine     non responder    Hepatitis A 1991    High frequency hearing loss     HIV (human immunodeficiency virus infection) 1995    On COM/cRix until kidney stones, sallie 200    Hypercholesterolemia     Hyperlipidemia     Kidney stone 2005    [facility], Lithotripsy    Lipoatrophy     Mild    Pertussis     As a child    Prostate cancer     prostate    Secondary syphilis 2002    Smoker     Vertigo 02/2018    Due to Ear Infection       Past Surgical History:   Procedure Laterality Date    CYSTOSCOPY N/A 8/17/2021    Procedure: CYSTOSCOPY;  Surgeon: Baljinder Johnson MD;  Location: [facility] OR;  Service: Urology;  Laterality: N/A;  unable to pass through bladder neck    CYSTOURETHROSCOPY WITH DIRECT VISION INTERNAL URETHROTOMY N/A 9/9/2021    Procedure: CYSTOSCOPY, WITH DIRECT VISION INTERNAL URETHROTOMY of bladder neck, possible dilation;  Surgeon: Baljinder Johnson MD;  Location: [facility] OR;  Service: Urology;  Laterality: N/A;    LITHOTRIPSY      ROBOT-ASSISTED LAPAROSCOPIC PROSTATECTOMY N/A 5/29/2019    Procedure: ROBOTIC PROSTATECTOMY;  Surgeon: Baljinder Johnson MD;  Location: [facility] OR;  Service: Urology;  Laterality: N/A;    TRANSRECTAL BIOPSY OF PROSTATE WITH ULTRASOUND GUIDANCE  N/A 2018    Procedure: BIOPSY, PROSTATE, RECTAL APPROACH, WITH US GUIDANCE;  Surgeon: Baljinder Johnson MD;  Location: [facility] OR;  Service: Urology;  Laterality: N/A;       Family History   Problem Relation Age of Onset    Diabetes Mother          in a MVC    COPD Father     Lung cancer Paternal Grandmother        Social History     Socioeconomic History    Marital status: Single   Occupational History    Occupation: Teacher   Tobacco Use    Smoking status: Current Every Day Smoker     Packs/day: 1.00     Types: Cigarettes    Smokeless tobacco: Never Used    Tobacco comment: 20 to 30   Substance and Sexual Activity    Alcohol use: No    Drug use: No    Sexual activity: Never     Comment: Abstinent     Social Determinants of Health     Financial Resource Strain: Low Risk     Difficulty of Paying Living Expenses: Not very hard   Food Insecurity: No Food Insecurity    Worried About Running Out of Food in the Last Year: Never true    Ran Out of Food in the Last Year: Never true   Transportation Needs: No Transportation Needs    Lack of Transportation (Medical): No    Lack of Transportation (Non-Medical): No   Physical Activity: Insufficiently Active    Days of Exercise per Week: 2 days    Minutes of Exercise per Session: 30 min   Stress: Stress Concern Present    Feeling of Stress : To some extent   Social Connections: Unknown    Frequency of Communication with Friends and Family: Twice a week    Frequency of Social Gatherings with Friends and Family: Once a week    Active Member of Clubs or Organizations: No    Attends Club or Organization Meetings: Never    Marital Status: Never    Housing Stability: Unknown    Unable to Pay for Housing in the Last Year: No    Unstable Housing in the Last Year: No       Current Outpatient Medications   Medication Sig Dispense Refill    aspirin 81 MG Chew Take 1 tablet (81 mg total) by mouth once daily. 30 tablet 1    efavirenz (SUSTIVA) 600  mg Tab Take 1 tablet (600 mg total) by mouth every evening. 90 tablet 1    emtricitabine-tenofovir alafen (DESCOVY) 200-25 mg Tab Take 1 tablet by mouth once daily. 90 tablet 2    EScitalopram oxalate (LEXAPRO) 10 MG tablet Take 1 tablet (10 mg total) by mouth once daily. 90 tablet 3    nitroGLYCERIN (NITROSTAT) 0.4 MG SL tablet If you have chest pain, place 1 tablet under tongue every 5 min as needed for 3 doses.  Then call your doctor afterwards 15 tablet 0    rosuvastatin (CRESTOR) 10 MG tablet Take 1 tablet (10 mg total) by mouth once daily. 90 tablet 3     No current facility-administered medications for this visit.       Review of patient's allergies indicates:  No Known Allergies      Objective:        Vitals:    07/27/22 1520   BP: 130/68   Pulse: 88       Physical Exam  Vitals reviewed.   Constitutional:       Appearance: Normal appearance.   HENT:      Mouth/Throat:      Mouth: Mucous membranes are moist.   Eyes:      Extraocular Movements: Extraocular movements intact.      Pupils: Pupils are equal, round, and reactive to light.   Cardiovascular:      Rate and Rhythm: Normal rate and regular rhythm.      Pulses: Normal pulses.      Heart sounds: Normal heart sounds. No murmur heard.    No gallop.   Pulmonary:      Effort: Pulmonary effort is normal.      Breath sounds: Normal breath sounds.   Abdominal:      General: Bowel sounds are normal.      Palpations: Abdomen is soft.   Musculoskeletal:         General: Normal range of motion.      Cervical back: Normal range of motion.   Skin:     General: Skin is warm and dry.   Neurological:      General: No focal deficit present.      Mental Status: He is alert and oriented to person, place, and time.   Psychiatric:         Mood and Affect: Mood normal.         LIPIDS - LAST 2   Lab Results   Component Value Date    CHOL 194 06/30/2022    CHOL 193 12/30/2021    HDL 31 (L) 06/30/2022    HDL 30 (L) 12/30/2021    LDLCALC 124 (H) 06/30/2022    LDLCALC 108 (H)  12/30/2021    TRIG 221 (H) 06/30/2022    TRIG 321 (H) 12/30/2021    CHOLHDL 14.2 (L) 11/20/2019       CBC - LAST 2  Lab Results   Component Value Date    WBC 5.1 06/30/2022    WBC 6.41 08/24/2021    RBC 4.95 06/30/2022    RBC 4.63 08/24/2021    HGB 16.0 06/30/2022    HGB 15.5 08/24/2021    HCT 48.0 06/30/2022    HCT 44.5 08/24/2021    MCV 97 06/30/2022    MCV 96 08/24/2021    MCH 32.3 06/30/2022    MCH 33.5 (H) 08/24/2021    MCHC 33.3 06/30/2022    MCHC 34.8 08/24/2021    RDW 12.6 06/30/2022    RDW 12.9 08/24/2021     06/30/2022     08/24/2021    MPV 9.4 08/24/2021    MPV 9.7 11/19/2019    GRAN 3.5 08/24/2021    GRAN 54.7 08/24/2021    LYMPH 1.4 06/30/2022    LYMPH 2.2 08/24/2021    LYMPH 33.9 08/24/2021    MONO 8 06/30/2022    MONO 0.4 06/30/2022    BASO 0.0 06/30/2022    BASO 0.04 08/24/2021    NRBC 0 08/24/2021    NRBC 0 11/19/2019       CHEMISTRY & LIVER FUNCTION - LAST 2  Lab Results   Component Value Date     06/30/2022     12/30/2021    K 4.2 06/30/2022    K 4.5 12/30/2021     06/30/2022     12/30/2021    CO2 24 06/30/2022    CO2 23 12/30/2021    ANIONGAP 8 08/24/2021    ANIONGAP 10 11/19/2019    BUN 8 06/30/2022    BUN 9 12/30/2021    CREATININE 0.96 06/30/2022    CREATININE 0.89 12/30/2021    GLU 98 06/30/2022     (H) 12/30/2021    CALCIUM 9.2 06/30/2022    CALCIUM 9.4 12/30/2021    MG 1.8 05/30/2019    ALBUMIN 5.0 (H) 06/30/2022    ALBUMIN 4.8 12/30/2021    PROT 7.1 06/19/2020    PROT 7.2 11/19/2019    ALKPHOS 92 06/19/2020    ALKPHOS 74 11/19/2019    ALT 18 06/30/2022    ALT 24 12/30/2021    AST 16 06/30/2022    AST 17 12/30/2021    BILITOT 0.3 06/30/2022    BILITOT 0.4 12/30/2021        CARDIAC PROFILE - LAST 2  Lab Results   Component Value Date    BNP 16 11/19/2019    TROPONINI <0.030 11/19/2019    TROPONINI <0.030 11/19/2019        COAGULATION - LAST 2  Lab Results   Component Value Date    INR 1.0 08/24/2021    INR 1.0 05/16/2019       ENDOCRINE & PSA -  LAST 2  Lab Results   Component Value Date    HGBA1C 5.1 01/02/2021    HGBA1C 5.1 06/19/2020    TSH 1.470 11/19/2019        ECHOCARDIOGRAM RESULTS  Results for orders placed during the hospital encounter of 11/19/19    Stress Echo Which stress agent will be used? Treadmill Exercise; Color Flow Doppler? No    Interpretation Summary  · The patient reached the end of the protocol.  · The stress echo portion of this study is negative for myocardial ischemia.  · The EKG portion of this study is negative for myocardial ischemia.  · The patient's exercise capacity was normal.  · There were no arrhythmias during stress.  · Normal left ventricular systolic function. The estimated ejection fraction is 65%  · Normal LV diastolic function.  · No wall motion abnormalities.  · Normal right ventricular systolic function.      CURRENT/PREVIOUS VISIT EKG  Results for orders placed or performed in visit on 07/13/21   IN OFFICE EKG 12-LEAD (to RMI)    Collection Time: 07/13/21 10:31 AM    Narrative    Test Reason : I77.9,    Vent. Rate : 082 BPM     Atrial Rate : 082 BPM     P-R Int : 144 ms          QRS Dur : 088 ms      QT Int : 368 ms       P-R-T Axes : 029 028 040 degrees     QTc Int : 429 ms    Normal sinus rhythm  Normal ECG  When compared with ECG of 19-NOV-2019 06:14,  No significant change was found  Confirmed by Karen KENNY, Julian DOSHI ([id number]) on 7/23/2021 12:33:44 PM    Referred By: KOREY WAKEFIELD           Confirmed By:Julian Jones MD     No valid procedures specified.   No results found for this or any previous visit.    No valid procedures specified.        Assessment:       1. Aorta disorder    2. Tobacco abuse    3. HIV infection, unspecified symptom status    4. Mixed hyperlipidemia           Plan:       Aorta disorder  -     IN OFFICE EKG 12-LEAD (to Muse)    Tobacco abuse    HIV infection, unspecified symptom status    Mixed hyperlipidemia    Aorta size for some reason has improved from 3.9 cm to 3.7 cm.  Patient  will need annual CT scans to follow up his aorta size or we can monitor this with an echocardiogram.  Patient was advised tobacco cessation.  He is at high risk for cardiovascular events due to his vascular disease, tobacco abuse as well as HIV.  Would target of LDL of around 100. His Crestor was recently increased to 10.  Patient was advised to give us a call if he has any exertional symptoms like chest pain or shortness of breath.    Follow up in about 1 year (around 7/27/2023).          Flakito Karimi MD  [facility] - Cardiology

## 2022-09-01 DIAGNOSIS — B20 HUMAN IMMUNODEFICIENCY VIRUS (HIV) DISEASE: Primary | ICD-10-CM

## 2022-09-01 RX ORDER — EFAVIRENZ 600 MG/1
600 TABLET, FILM COATED ORAL NIGHTLY
Qty: 90 TABLET | Refills: 1 | Status: SHIPPED | OUTPATIENT
Start: 2022-09-01 | End: 2022-09-26 | Stop reason: SDUPTHER

## 2022-09-23 ENCOUNTER — TELEPHONE (OUTPATIENT)
Dept: INFECTIOUS DISEASES | Facility: CLINIC | Age: 62
End: 2022-09-23

## 2022-09-23 DIAGNOSIS — B20 HUMAN IMMUNODEFICIENCY VIRUS (HIV) DISEASE: ICD-10-CM

## 2022-09-23 NOTE — TELEPHONE ENCOUNTER
Heather called would like to have a 90 day supply of efavirenz sent to [facility] .  sent it to [facility] an [facility] . Thank you

## 2022-09-26 RX ORDER — EFAVIRENZ 600 MG/1
600 TABLET, FILM COATED ORAL NIGHTLY
Qty: 90 TABLET | Refills: 1 | Status: SHIPPED | OUTPATIENT
Start: 2022-09-26 | End: 2023-01-09 | Stop reason: SDUPTHER

## 2023-01-04 ENCOUNTER — HOSPITAL ENCOUNTER (OUTPATIENT)
Dept: RADIOLOGY | Facility: HOSPITAL | Age: 63
Discharge: HOME | End: 2023-01-04
Attending: NURSE PRACTITIONER
Payer: COMMERCIAL

## 2023-01-04 ENCOUNTER — OFFICE VISIT (OUTPATIENT)
Dept: UROLOGY | Facility: CLINIC | Age: 63
End: 2023-01-04
Payer: COMMERCIAL

## 2023-01-04 ENCOUNTER — TELEPHONE (OUTPATIENT)
Dept: UROLOGY | Facility: CLINIC | Age: 63
End: 2023-01-04
Payer: COMMERCIAL

## 2023-01-04 VITALS
BODY MASS INDEX: 24.1 KG/M2 | HEART RATE: 100 BPM | HEIGHT: 66 IN | WEIGHT: 149.94 LBS | SYSTOLIC BLOOD PRESSURE: 127 MMHG | DIASTOLIC BLOOD PRESSURE: 79 MMHG

## 2023-01-04 DIAGNOSIS — N20.0 KIDNEY STONES: Primary | ICD-10-CM

## 2023-01-04 DIAGNOSIS — R35.0 URINARY FREQUENCY: ICD-10-CM

## 2023-01-04 DIAGNOSIS — N22 CALCULUS OF URINARY TRACT IN DISEASES CLASSIFIED ELSEWHERE: ICD-10-CM

## 2023-01-04 DIAGNOSIS — Z85.46 HISTORY OF PROSTATE CANCER: ICD-10-CM

## 2023-01-04 DIAGNOSIS — N50.819 PAIN IN TESTICLE, UNSPECIFIED LATERALITY: ICD-10-CM

## 2023-01-04 LAB
BACTERIA #/AREA URNS HPF: ABNORMAL /HPF
BILIRUBIN, UA POC OHS: NEGATIVE
BLOOD, UA POC OHS: ABNORMAL
CLARITY, UA POC OHS: CLEAR
COLOR, UA POC OHS: YELLOW
GLUCOSE, UA POC OHS: NEGATIVE
KETONES, UA POC OHS: NEGATIVE
LEUKOCYTES, UA POC OHS: NEGATIVE
MICROSCOPIC COMMENT: ABNORMAL
NITRITE, UA POC OHS: NEGATIVE
PH, UA POC OHS: 5.5
PROTEIN, UA POC OHS: NEGATIVE
RBC #/AREA URNS HPF: >100 /HPF (ref 0–4)
SPECIFIC GRAVITY, UA POC OHS: >=1.03
SQUAMOUS #/AREA URNS HPF: 2 /HPF
UROBILINOGEN, UA POC OHS: 0.2
WBC #/AREA URNS HPF: 2 /HPF (ref 0–5)

## 2023-01-04 PROCEDURE — 1160F RVW MEDS BY RX/DR IN RCRD: CPT | Mod: CPTII,S$GLB,, | Performed by: NURSE PRACTITIONER

## 2023-01-04 PROCEDURE — 3078F DIAST BP <80 MM HG: CPT | Mod: CPTII,S$GLB,, | Performed by: NURSE PRACTITIONER

## 2023-01-04 PROCEDURE — 99999 PR PBB SHADOW E&M-EST. PATIENT-LVL IV: ICD-10-PCS | Mod: PBBFAC,,, | Performed by: NURSE PRACTITIONER

## 2023-01-04 PROCEDURE — 74176 CT ABD & PELVIS W/O CONTRAST: CPT | Mod: 26,,, | Performed by: RADIOLOGY

## 2023-01-04 PROCEDURE — 3008F BODY MASS INDEX DOCD: CPT | Mod: CPTII,S$GLB,, | Performed by: NURSE PRACTITIONER

## 2023-01-04 PROCEDURE — 74176 CT ABD & PELVIS W/O CONTRAST: CPT | Mod: TC

## 2023-01-04 PROCEDURE — 74176 CT RENAL STONE STUDY ABD PELVIS WO: ICD-10-PCS | Mod: 26,,, | Performed by: RADIOLOGY

## 2023-01-04 PROCEDURE — 87086 URINE CULTURE/COLONY COUNT: CPT | Performed by: NURSE PRACTITIONER

## 2023-01-04 PROCEDURE — 1159F PR MEDICATION LIST DOCUMENTED IN MEDICAL RECORD: ICD-10-PCS | Mod: CPTII,S$GLB,, | Performed by: NURSE PRACTITIONER

## 2023-01-04 PROCEDURE — 3078F PR MOST RECENT DIASTOLIC BLOOD PRESSURE < 80 MM HG: ICD-10-PCS | Mod: CPTII,S$GLB,, | Performed by: NURSE PRACTITIONER

## 2023-01-04 PROCEDURE — 99214 OFFICE O/P EST MOD 30 MIN: CPT | Mod: S$GLB,,, | Performed by: NURSE PRACTITIONER

## 2023-01-04 PROCEDURE — 3074F SYST BP LT 130 MM HG: CPT | Mod: CPTII,S$GLB,, | Performed by: NURSE PRACTITIONER

## 2023-01-04 PROCEDURE — 81003 URINALYSIS AUTO W/O SCOPE: CPT | Mod: QW,S$GLB,, | Performed by: NURSE PRACTITIONER

## 2023-01-04 PROCEDURE — 1159F MED LIST DOCD IN RCRD: CPT | Mod: CPTII,S$GLB,, | Performed by: NURSE PRACTITIONER

## 2023-01-04 PROCEDURE — 99999 PR PBB SHADOW E&M-EST. PATIENT-LVL IV: CPT | Mod: PBBFAC,,, | Performed by: NURSE PRACTITIONER

## 2023-01-04 PROCEDURE — 81003 POCT URINALYSIS(INSTRUMENT): ICD-10-PCS | Mod: QW,S$GLB,, | Performed by: NURSE PRACTITIONER

## 2023-01-04 PROCEDURE — 3008F PR BODY MASS INDEX (BMI) DOCUMENTED: ICD-10-PCS | Mod: CPTII,S$GLB,, | Performed by: NURSE PRACTITIONER

## 2023-01-04 PROCEDURE — 3074F PR MOST RECENT SYSTOLIC BLOOD PRESSURE < 130 MM HG: ICD-10-PCS | Mod: CPTII,S$GLB,, | Performed by: NURSE PRACTITIONER

## 2023-01-04 PROCEDURE — 99214 PR OFFICE/OUTPT VISIT, EST, LEVL IV, 30-39 MIN: ICD-10-PCS | Mod: S$GLB,,, | Performed by: NURSE PRACTITIONER

## 2023-01-04 PROCEDURE — 81000 URINALYSIS NONAUTO W/SCOPE: CPT | Performed by: NURSE PRACTITIONER

## 2023-01-04 PROCEDURE — 1160F PR REVIEW ALL MEDS BY PRESCRIBER/CLIN PHARMACIST DOCUMENTED: ICD-10-PCS | Mod: CPTII,S$GLB,, | Performed by: NURSE PRACTITIONER

## 2023-01-04 RX ORDER — CIPROFLOXACIN 500 MG/1
500 TABLET ORAL 2 TIMES DAILY
Qty: 28 TABLET | Refills: 0 | Status: SHIPPED | OUTPATIENT
Start: 2023-01-04 | End: 2023-01-18

## 2023-01-04 NOTE — PROGRESS NOTES
[facility] Note  Staff: LUDIVINA Tamez    PCP: MD Artemio  Urologist:  MD Alex    Chief Complaint: Bladder pressure, increased urinary frequency    Subjective:        HPI: Jones Germain is a 62 y.o. male presents with new onset bladder pressure and increased urinary frequency since this past Sunday night.  Therefore he is here today for further evaluation.    Pt was last evaluated by Dr. Johnson on 7/12/2022.     prostate ca follow up, with history [facility]     Prostate biopsy 9/2018 revealing a 57.3g gland with 5/15 cores aiden 6 prostate cancer, largely left sided, with small volume on right. Diagnosed with L prostate nodule and psa 2.0. He initially elected surveillancebut 6 months later had interval psa rise to 2.7 and progression of his LUTS with AUA SS 17/5. He elected to proceed with robotic assisted radical prostatectomy     5/29/19: Robot-assisted laparoscopic radical prostatectomy, right nerve sparing. Bilateral pelvic lymphadenectomy. Significant median lobe with BN reconstruction  PATHOLOGY: [id number] aiden 3+3 negative margin     By 4 mos postop was just wearing pad for safety but no sig IMMANUEL and continued smoking. PSA has been undetectable postop. By 7/2020 no leakage no pads, occ slow stream occ urgency.  Udips continued to have blood, though UA micro only had 1rbc in 7/2020, but CaOx crystalluria so got KUB. Hx stones last episode in 2005  KUB 7/2020: multiple small 1-2 mm and a 4 mm calcification overlying the left kidney consistent with intrarenal stones.  There is at least 1 right intrarenal stone identified.  Calcifications in the expected course of either ureter is not seen. He deferred CT in favor of adhering to stone prevention recs and repeating KUB at time of 6 mos psa and following up with NP     KUB 1/21: There are several stones in the left mid abdomen, at least 3 in number with largest 5 mm.  He saw NP after that Xray, at which time psa undetectable, in Jan  2021 noting UA with mod blood this time. Advised CT and possible cysto if true MH and UA micro had 75 rbc/hpf though no further workup done. UA micro weeks before that with 4 rbc.     8/2021: PSA <0.01, Still smoking about 1/2 ppd and smoking since age 17, Still continent, no pads no leakage. Still no erectile function and ok with it., no GH but +MH and CT with small bilat kidney stones and a few small bladder stones. Suspicious cytology. Cysto dxed NC     9/9/21: Cystoscopy with release of bladder neck contracture via direct vision internal urethrotomy and removal multiple bladder stones  11/22/21 uroflow:  no significant intermittency.  Equivocal for obstruction but no intermittent spikes which would be concerning for early stricture or bladder neck contracture recurrence.   He reports no bothersome voiding symptoms and empties well     OV 07/12/22:  No trouble voiding.  No weak stream.  No hematuria.  No incontinence.  Nocturia x1 but if does not drink at night, times 0.  Trace blood on urine dipstick persists.  PSA less than 0.01, undetectable on 7/5/22     TODAY:  UA today showed small blood, otherwise normal findings.  No gross hematuria, no dysuria noted by pt.    REVIEW OF SYSTEMS:  A comprehensive 10 system review was performed and is negative except as noted above in HPI    PMHx:  Past Medical History:   Diagnosis Date    Adverse effect of hepatitis B vaccine     non responder    Hepatitis A 1991    High frequency hearing loss     HIV (human immunodeficiency virus infection) 1995    On COM/cRix until kidney stones, sallie 200    Hypercholesterolemia     Hyperlipidemia     Kidney stone 2005    [facility], Lithotripsy    Lipoatrophy     Mild    Pertussis     As a child    Prostate cancer     prostate    Secondary syphilis 2002    Smoker     Vertigo 02/2018    Due to Ear Infection     PSHx:  Past Surgical History:   Procedure Laterality Date    CYSTOSCOPY N/A 8/17/2021    Procedure: CYSTOSCOPY;  Surgeon: Baljinder DALEY  MD Alex;  Location: [facility] OR;  Service: Urology;  Laterality: N/A;  unable to pass through bladder neck    CYSTOURETHROSCOPY WITH DIRECT VISION INTERNAL URETHROTOMY N/A 9/9/2021    Procedure: CYSTOSCOPY, WITH DIRECT VISION INTERNAL URETHROTOMY of bladder neck, possible dilation;  Surgeon: Baljinder Johnson MD;  Location: [facility] OR;  Service: Urology;  Laterality: N/A;    LITHOTRIPSY      ROBOT-ASSISTED LAPAROSCOPIC PROSTATECTOMY N/A 5/29/2019    Procedure: ROBOTIC PROSTATECTOMY;  Surgeon: Baljinder Johnson MD;  Location: [facility] OR;  Service: Urology;  Laterality: N/A;    TRANSRECTAL BIOPSY OF PROSTATE WITH ULTRASOUND GUIDANCE N/A 9/25/2018    Procedure: BIOPSY, PROSTATE, RECTAL APPROACH, WITH US GUIDANCE;  Surgeon: Baljinder Johnson MD;  Location: [facility] OR;  Service: Urology;  Laterality: N/A;     Allergies:  Patient has no known allergies.    Medications: reviewed   Objective:     Vitals:    01/04/23 1406   BP: 127/79   Pulse: 100       General:WDWN in NAD  Eyes: PERRLA, normal conjunctiva  Respiratory: no increased work on breathing, clear to auscultation  Cardiovascular: regular rate and rhythm. No obvious extremity edema.  GI: palpation of masses. No tenderness. No hepatosplenomegaly to palpation.  Musculoskeletal: normal range of motion of bilateral upper extremities. Normal muscle strength and tone.  Skin: no obvious rashes or lesions. No tightening of skin noted.  Neurologic: CN grossly normal. Normal sensation.   Psychiatric: awake, alert and oriented x 3. Mood and affect normal. Cooperative.    Assessment:       1. Kidney stones    2. History of prostate cancer    3. Pain in testicle, unspecified laterality    4. Calculus of urinary tract in diseases classified elsewhere    5. Urinary frequency          Plan:   LUTS:  Prostatitis vs. Kidney stones    UA Micro and Urine culture to be performed.  CT Renal Stone Study to be done to rule out obstruction of stone at this time.  In the meantime will prescribe  Cipro 500 mg BID.    F/u We will contact this pt after we review imaging and lab results for further plan of care.    [facility]: Active    Ondina Castillo, STEVEC

## 2023-01-04 NOTE — TELEPHONE ENCOUNTER
Spoke w pt who called and c/o   Testicular pain and unsure if stone or uti   Pt offered appt today with Np for further eval   Pt accepted appt

## 2023-01-04 NOTE — TELEPHONE ENCOUNTER
----- Message from Eder Everett sent at 1/4/2023  7:09 AM CST -----  Type:  Same Day Appointment Request  Caller is requesting a same day appointment.  Caller declined first available appointment listed below.    Name of Caller:  Pt   When is the first available appointment? Scheduling denied  Symptoms:  possible UTI  Best Call Back Number: [phone]   Additional Information:   Pt requesting a call back concerning getting a same day appt today, scheduling was denied per the decision tree.

## 2023-01-06 LAB — BACTERIA UR CULT: NO GROWTH

## 2023-01-09 ENCOUNTER — OFFICE VISIT (OUTPATIENT)
Dept: INFECTIOUS DISEASES | Facility: CLINIC | Age: 63
End: 2023-01-09
Payer: COMMERCIAL

## 2023-01-09 VITALS
HEART RATE: 100 BPM | BODY MASS INDEX: 23.95 KG/M2 | WEIGHT: 149 LBS | OXYGEN SATURATION: 95 % | TEMPERATURE: 98 F | HEIGHT: 66 IN | SYSTOLIC BLOOD PRESSURE: 118 MMHG | DIASTOLIC BLOOD PRESSURE: 60 MMHG

## 2023-01-09 DIAGNOSIS — Z79.899 LIPODYSTROPHY DUE TO HIV INFECTION AND ANTIRETROVIRAL THERAPY: ICD-10-CM

## 2023-01-09 DIAGNOSIS — B20 HIV INFECTION, UNSPECIFIED SYMPTOM STATUS: Chronic | ICD-10-CM

## 2023-01-09 DIAGNOSIS — F17.200 SMOKER: ICD-10-CM

## 2023-01-09 DIAGNOSIS — B20 LIPODYSTROPHY DUE TO HIV INFECTION AND ANTIRETROVIRAL THERAPY: ICD-10-CM

## 2023-01-09 DIAGNOSIS — Z71.89 EDUCATED ABOUT COVID-19 VIRUS INFECTION: ICD-10-CM

## 2023-01-09 DIAGNOSIS — E88.1 LIPODYSTROPHY DUE TO HIV INFECTION AND ANTIRETROVIRAL THERAPY: ICD-10-CM

## 2023-01-09 DIAGNOSIS — B20 HUMAN IMMUNODEFICIENCY VIRUS (HIV) DISEASE: Primary | ICD-10-CM

## 2023-01-09 DIAGNOSIS — E78.00 HIGH CHOLESTEROL: ICD-10-CM

## 2023-01-09 PROCEDURE — 1159F MED LIST DOCD IN RCRD: CPT | Mod: CPTII,S$GLB,, | Performed by: INTERNAL MEDICINE

## 2023-01-09 PROCEDURE — 3074F SYST BP LT 130 MM HG: CPT | Mod: CPTII,S$GLB,, | Performed by: INTERNAL MEDICINE

## 2023-01-09 PROCEDURE — 3078F PR MOST RECENT DIASTOLIC BLOOD PRESSURE < 80 MM HG: ICD-10-PCS | Mod: CPTII,S$GLB,, | Performed by: INTERNAL MEDICINE

## 2023-01-09 PROCEDURE — 3078F DIAST BP <80 MM HG: CPT | Mod: CPTII,S$GLB,, | Performed by: INTERNAL MEDICINE

## 2023-01-09 PROCEDURE — 3008F BODY MASS INDEX DOCD: CPT | Mod: CPTII,S$GLB,, | Performed by: INTERNAL MEDICINE

## 2023-01-09 PROCEDURE — 1159F PR MEDICATION LIST DOCUMENTED IN MEDICAL RECORD: ICD-10-PCS | Mod: CPTII,S$GLB,, | Performed by: INTERNAL MEDICINE

## 2023-01-09 PROCEDURE — 99214 OFFICE O/P EST MOD 30 MIN: CPT | Mod: S$GLB,,, | Performed by: INTERNAL MEDICINE

## 2023-01-09 PROCEDURE — 3008F PR BODY MASS INDEX (BMI) DOCUMENTED: ICD-10-PCS | Mod: CPTII,S$GLB,, | Performed by: INTERNAL MEDICINE

## 2023-01-09 PROCEDURE — 99214 PR OFFICE/OUTPT VISIT, EST, LEVL IV, 30-39 MIN: ICD-10-PCS | Mod: S$GLB,,, | Performed by: INTERNAL MEDICINE

## 2023-01-09 PROCEDURE — 3074F PR MOST RECENT SYSTOLIC BLOOD PRESSURE < 130 MM HG: ICD-10-PCS | Mod: CPTII,S$GLB,, | Performed by: INTERNAL MEDICINE

## 2023-01-09 RX ORDER — EMTRICITABINE AND TENOFOVIR ALAFENAMIDE 200; 25 MG/1; MG/1
1 TABLET ORAL DAILY
Qty: 90 TABLET | Refills: 2 | Status: SHIPPED | OUTPATIENT
Start: 2023-01-09 | End: 2023-04-24 | Stop reason: SDUPTHER

## 2023-01-09 RX ORDER — ROSUVASTATIN CALCIUM 10 MG/1
10 TABLET, COATED ORAL DAILY
Qty: 90 TABLET | Refills: 3 | Status: SHIPPED | OUTPATIENT
Start: 2023-01-09 | End: 2023-12-04 | Stop reason: SDUPTHER

## 2023-01-09 RX ORDER — ESCITALOPRAM OXALATE 10 MG/1
10 TABLET ORAL DAILY
Qty: 90 TABLET | Refills: 3 | Status: SHIPPED | OUTPATIENT
Start: 2023-01-09 | End: 2023-12-04 | Stop reason: SDUPTHER

## 2023-01-09 RX ORDER — EFAVIRENZ 600 MG/1
600 TABLET, FILM COATED ORAL NIGHTLY
Qty: 90 TABLET | Refills: 2 | Status: SHIPPED | OUTPATIENT
Start: 2023-01-09 | End: 2023-03-13 | Stop reason: SDUPTHER

## 2023-01-09 NOTE — PROGRESS NOTES
Subjective:       Patient ID: Jones Germain is a 62 y.o. male.    Chief Complaint:: Follow-up and HIV Positive/AIDS    Follow-up    HIV Positive/AIDS  Discussed prostate cancer diagnosis and options, admits depression. Concerned about requiring catheter, being incontinent, radiation adverse effects. He is planning to do the surgery before summer break so that he will have time to recover.  Did receive his flu vaccine. Converted to generic atripla  Compliant with meds. Still smoking. Has not yet established with primary care. Discussed that he will need clearance for surgery and anesthesia    6/27/19: he was seen by Dr. De La Torre for cardiac clearance. On 5/29 he had robotic prostatectomy, received bactrim post op and had jones for 2 weeks. Requiring no pain meds but has sensitivity over his midline abdominal incision. Tells me he will not require any additional treatment. He feels like he is emptying his bladder well. Stream is good. Rare leak. He stopped his Atripla for a week after the surgery. Very frustrated with the cost of his care and very limited in his finances. He isolates himself    12/30/19: reviewed recent hospitalization. He went in for chest pain, had a negative experience, had a stress echo which was negative and CT chest and calcium score. 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 He left AMA for personal reasons. He Has reduced cig to 1/4 ppd. Very stressed from work related issues, cost of medical care. Had a diarrheal illness for 2 weeks after that admission and [facility]. He has lots of anxiety, frustration, anger, depression underlying and prior to these events. He did not feel that the wellbutrin did much good. He is also using a nicotine patch which may be too strong for the 1/4 pack he is smoking now. His BP is high as well as pulse. He states that he measures his blood pressure at home from time to time and its ok. Discussed that HTN can make the aorta more dilated and risk  an aneurysm forming. He did not have viralload in July as requested.     6/15/20: he is protecting himself from COVID. He is socializing very little.  lexapro has helped him a great deal. He started doing yoga and he enjoys this.  He has not seen cardiologist yet.     12/30/20: since last visit, he had a fall and radiculopathy consistent with sciatica. He did have benefit from gabapentin and meloxicam but ran out. He is concerned about going back to school and COVID risk. He has not yet seen cardiology for follow up on aortic abnormality    6/28/21: sciatica is almost well, no longer gabapentin or meloxicam. Adherent to descovy/sustiva. Had to replace his roof, adding financial stress. He has not pursued cardiology eval.     12/29/21: since last visit, he had to have cysto x2 to remove bladder stones and repair a stricture. His nocturia is resolved. No incontinence any longer. He will have echo to screen thoracic aorta in July. Did have COVID booster and has not yet received influenza vaccine.   Occasional flare of sciatica. Does not feel that he needs PT. He scrubbed his kitchen floor yesterday.     6/27/22: he took a second booster for COVID. reviewed interim. Seeing cardiology and urology.    Now taking a MVI, fish oil, Mg, K, B12, zinc and he feels better. 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     1/9/23: bladder pressure, frequency, no retention prompted  visit, US with mild blood, culture negative and CT without obstruction. Given cipro for 2-3 weeks. Bladder pressure improved, seeing them again 1/27  Prior to [place], he had low grade fever, cough, runny nose, sinus congestion, chest congestion, no purulent discharge, mild mucous related wheezing. Had some mild SOB after coughing. Using some nyquil. Vicks vapo rub , but did not call. Has had his influenza vaccine.     Current Outpatient Medications:     aspirin 81 MG Chew, Take 1 tablet (81 mg total) by mouth once daily., Disp: 30 tablet, Rfl: 1     ciprofloxacin HCl (CIPRO) 500 MG tablet, Take 1 tablet (500 mg total) by mouth 2 (two) times daily. for 14 days, Disp: 28 tablet, Rfl: 0    nitroGLYCERIN (NITROSTAT) 0.4 MG SL tablet, If you have chest pain, place 1 tablet under tongue every 5 min as needed for 3 doses.  Then call your doctor afterwards, Disp: 15 tablet, Rfl: 0    efavirenz (SUSTIVA) 600 mg Tab, Take 1 tablet (600 mg total) by mouth every evening., Disp: 90 tablet, Rfl: 2    emtricitabine-tenofovir alafen (DESCOVY) 200-25 mg Tab, Take 1 tablet by mouth once daily., Disp: 90 tablet, Rfl: 2    EScitalopram oxalate (LEXAPRO) 10 MG tablet, Take 1 tablet (10 mg total) by mouth once daily., Disp: 90 tablet, Rfl: 3    rosuvastatin (CRESTOR) 10 MG tablet, Take 1 tablet (10 mg total) by mouth once daily., Disp: 90 tablet, Rfl: 3  Review of patient's allergies indicates:  No Known Allergies  Past Medical History:   Diagnosis Date    Adverse effect of hepatitis B vaccine     non responder    Hepatitis A 1991    High frequency hearing loss     HIV (human immunodeficiency virus infection) 1995    On COM/cRix until kidney stones, sallie 200    Hypercholesterolemia     Hyperlipidemia     Kidney stone 2005    [facility], Lithotripsy    Lipoatrophy     Mild    Pertussis     As a child    Prostate cancer     prostate    Secondary syphilis 2002    Smoker     Vertigo 02/2018    Due to Ear Infection     Past Surgical History:   Procedure Laterality Date    CYSTOSCOPY N/A 8/17/2021    Procedure: CYSTOSCOPY;  Surgeon: Baljinder Johnson MD;  Location: [facility] OR;  Service: Urology;  Laterality: N/A;  unable to pass through bladder neck    CYSTOURETHROSCOPY WITH DIRECT VISION INTERNAL URETHROTOMY N/A 9/9/2021    Procedure: CYSTOSCOPY, WITH DIRECT VISION INTERNAL URETHROTOMY of bladder neck, possible dilation;  Surgeon: Baljinder Johnson MD;  Location: [facility] OR;  Service: Urology;  Laterality: N/A;    LITHOTRIPSY      ROBOT-ASSISTED LAPAROSCOPIC PROSTATECTOMY N/A 5/29/2019     Procedure: ROBOTIC PROSTATECTOMY;  Surgeon: Baljinder Johnson MD;  Location: [facility] OR;  Service: Urology;  Laterality: N/A;    TRANSRECTAL BIOPSY OF PROSTATE WITH ULTRASOUND GUIDANCE N/A 2018    Procedure: BIOPSY, PROSTATE, RECTAL APPROACH, WITH US GUIDANCE;  Surgeon: Baljinder Johnson MD;  [facility];  Service: Urology;  Laterality: N/A;     Social History     Socioeconomic History    Marital status: Single   Occupational History    Occupation: Teacher   Tobacco Use    Smoking status: Every Day     Packs/day: 1.00     Types: Cigarettes    Smokeless tobacco: Never    Tobacco comments:     20 to 30   Substance and Sexual Activity    Alcohol use: No    Drug use: No    Sexual activity: Never     Comment: Abstinent     Social Determinants of Health     Financial Resource Strain: Low Risk     Difficulty of Paying Living Expenses: Not very hard   Food Insecurity: No Food Insecurity    Worried About Running Out of Food in the Last Year: Never true    Ran Out of Food in the Last Year: Never true   Transportation Needs: No Transportation Needs    Lack of Transportation (Medical): No    Lack of Transportation (Non-Medical): No   Physical Activity: Insufficiently Active    Days of Exercise per Week: 2 days    Minutes of Exercise per Session: 30 min   Stress: Stress Concern Present    Feeling of Stress : To some extent   Social Connections: Unknown    Frequency of Communication with Friends and Family: Twice a week    Frequency of Social Gatherings with Friends and Family: Once a week    Active Member of Clubs or Organizations: No    Attends Club or Organization Meetings: Never    Marital Status: Never    Housing Stability: Unknown    Unable to Pay for Housing in the Last Year: No    Unstable Housing in the Last Year: No     Family History   Problem Relation Age of Onset    Diabetes Mother          in a MVC    COPD Father     Lung cancer Paternal Grandmother        Travel History:   Vaccine History:  "Yearly flu vaccine, Pneumovax 2004, 2011, Prevnar 2014, tetanus 2005, Td AP 2014, Zostavax June 2016, hepatitis B ×3 2016, shingrix x 2 2020, COVID x 3 2021, 6/2022,  menactra 2018, 06/2021    Advanced Directive:   Safer Sex: Abstinent  Bone Density: June 2011 osteopenia  Colonoscopy: 2015(2025)    Review of Systems    Constitutional: No fever, chills, sweats, fatigue, weakness,        Eyes: No change in vision, loss of vision,  . No visit to eye doctor for several years    ENT: No sinus drainage, sore throat, mouth pain, or lesions. Not UTD with dentist, finding a new dentist    Cardiovascular: No chest pain, GAVIN, palpitations or pedal edema    Respiratory: No shortness of breath, GAVIN, cough, wheeze, sputum, pleurisy, or hemoptysis. Still smoking 1/4 pack per day  . He is really trying to stop. He has been contacted by smoking cessation program.     Gastrointestinal: No abdominal pain, nausea, vomiting, diarrhea,  or focal abd pain    Genitourinary: No dysuria, hematuria, incontinence, frequency, flank pain,   Has ED    Musculoskeletal: sciatica is better    Integumentary: No new rashes, lesions,   Neurological: No dizziness, vertigo, unusual headaches,     Psychiatric: he is pleased with lexapro,      Endocrine: No diabetes Thyroid normal    Lymphatic: No lymphadenopathy, blood loss, anemia,     Objective:      Blood pressure 118/60, pulse 100, temperature 98.4 °F (36.9 °C), height 5' 6" (1.676 m), weight 67.6 kg (149 lb), SpO2 95 %. Body mass index is 24.05 kg/m².  Physical Exam   Repeat was 126/80    General: Alert and attentive, cooperative  .    Eyes: Pupils equal, round, reactive to light, anicteric, EOMI    Neck: Supple, non-tender, no thyromegaly or masses    ENT: EAC patent, TM normal, nares patent, no oral lesions, teeth in fair condition, no thrush    Cardiovascular: Regular rate and rhythm, no murmurs, rubs, or gallop    Respiratory: Lungs clear without wheezes, no rales, rub or " rhonci    Gastrointestinal: Active bowel sounds, soft, no mass or organomegaly, no tenderness or distention    Genitourinary: No flank tenderness    Vascular: No peripheral edema, phlebitis, pulses normal. Warm and well perfused, DP/PT/Pop pulses palpable bilaterally    Musculoskeletal: Ambulates without difficulty , no acute arthritis, synovitis or myositis. Normal muscle bulk and strength     Integumentary: Skin without rashes, lesions,     AnusRectum: per urology    Neurological: Normal LOC, cranial nerves, speech, reflexes, normal gait    Psychiatric: usual demeanor    Lymphatic: No cervical, supraclavicular, axillary, or inguinal lymphadenopathy      Wound:      HIV Table:   HLA-B 5701     TOXOIgG     RPR 7/2021 non reactive   HEP A IgG 6/8/2015 infection/immune   HBsAg 6/8/2015 negative   HBsAb 12/3/2016 negative, non responder   HBcAb     HBeAb     HBeAg     HCVAb 6/30/22 negative   HBV DNA     HCV RNA     Vitamin D 1/20/2018 40   Chlamydia / GC 12/5/2014 negative   TB Gold  6/30/22  negative  PSA   1/4/23  undetectable  HIV RNA  6/30/22 <20  COVID IgG   6/2020  negative    Recent Diagnostics: Reviewed 2021       Assessment and Plan:           Human immunodeficiency virus (HIV) disease  -     CBC Auto Differential; Future; Expected date: 01/09/2023  -     Comprehensive Metabolic Panel; Future; Expected date: 01/09/2023  -     Lipid Panel; Future; Expected date: 01/09/2023  -     HIV RNA, Quantitative, PCR; Future; Expected date: 01/09/2023  -     RPR; Future; Expected date: 01/09/2023  -     efavirenz (SUSTIVA) 600 mg Tab; Take 1 tablet (600 mg total) by mouth every evening.  Dispense: 90 tablet; Refill: 2    High cholesterol  -     Lipid Panel; Future; Expected date: 01/09/2023    HIV infection, unspecified symptom status  -     emtricitabine-tenofovir alafen (DESCOVY) 200-25 mg Tab; Take 1 tablet by mouth once daily.  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  Dispense: 90 tablet; Refill: 3  -     rosuvastatin (CRESTOR) 10 MG tablet; Take 1 tablet (10 mg total) by mouth once daily.  Dispense: 90 tablet; Refill: 3       Same medications  Lab tomorrow    Smoking cessation referral given    Flu vaccine in the fall    Return in december

## 2023-01-09 NOTE — PATIENT INSTRUCTIONS
Same medications    Budesonide nasal spray to the affected side, twice as much as the package states  Or short acting sudafed(pseudoephedrine) can reduce the Eustachian tube dysfunction and reduce the pressure  Hydration with electrolyte drinks/water    Continue reducing cigarettes and give the smoking cessation program a try

## 2023-01-17 LAB
ALBUMIN SERPL-MCNC: 5.2 G/DL (ref 3.8–4.8)
ALBUMIN/GLOB SERPL: 2.7 {RATIO} (ref 1.2–2.2)
ALP SERPL-CCNC: 119 IU/L (ref 44–121)
ALT SERPL-CCNC: 18 IU/L (ref 0–44)
AST SERPL-CCNC: 16 IU/L (ref 0–40)
BASOPHILS # BLD AUTO: 0 X10E3/UL (ref 0–0.2)
BASOPHILS NFR BLD AUTO: 0 %
BILIRUB SERPL-MCNC: 0.3 MG/DL (ref 0–1.2)
BUN SERPL-MCNC: 12 MG/DL (ref 8–27)
BUN/CREAT SERPL: 12 (ref 10–24)
CALCIUM SERPL-MCNC: 9.6 MG/DL (ref 8.6–10.2)
CHLORIDE SERPL-SCNC: 102 MMOL/L (ref 96–106)
CHOLEST SERPL-MCNC: 166 MG/DL (ref 100–199)
CO2 SERPL-SCNC: 23 MMOL/L (ref 20–29)
CREAT SERPL-MCNC: 0.97 MG/DL (ref 0.76–1.27)
EOSINOPHIL # BLD AUTO: 0.1 X10E3/UL (ref 0–0.4)
EOSINOPHIL NFR BLD AUTO: 2 %
ERYTHROCYTE [DISTWIDTH] IN BLOOD BY AUTOMATED COUNT: 12.5 % (ref 11.6–15.4)
EST. GFR  (NO RACE VARIABLE): 88 ML/MIN/1.73
GLOBULIN SER CALC-MCNC: 1.9 G/DL (ref 1.5–4.5)
GLUCOSE SERPL-MCNC: 107 MG/DL (ref 70–99)
HCT VFR BLD AUTO: 49.3 % (ref 37.5–51)
HDLC SERPL-MCNC: 36 MG/DL
HGB BLD-MCNC: 16.2 G/DL (ref 13–17.7)
HIV1 RNA # SERPL NAA+PROBE: <20 COPIES/ML
HIV1 RNA SERPL NAA+PROBE-LOG#: NORMAL LOG10COPY/ML
IMM GRANULOCYTES # BLD AUTO: 0 X10E3/UL (ref 0–0.1)
IMM GRANULOCYTES NFR BLD AUTO: 1 %
LDLC SERPL CALC-MCNC: 98 MG/DL (ref 0–99)
LYMPHOCYTES # BLD AUTO: 1.7 X10E3/UL (ref 0.7–3.1)
LYMPHOCYTES NFR BLD AUTO: 31 %
MCH RBC QN AUTO: 31.6 PG (ref 26.6–33)
MCHC RBC AUTO-ENTMCNC: 32.9 G/DL (ref 31.5–35.7)
MCV RBC AUTO: 96 FL (ref 79–97)
MONOCYTES # BLD AUTO: 0.5 X10E3/UL (ref 0.1–0.9)
MONOCYTES NFR BLD AUTO: 9 %
NEUTROPHILS # BLD AUTO: 3.1 X10E3/UL (ref 1.4–7)
NEUTROPHILS NFR BLD AUTO: 57 %
PLATELET # BLD AUTO: 231 X10E3/UL (ref 150–450)
POTASSIUM SERPL-SCNC: 4.5 MMOL/L (ref 3.5–5.2)
PROT SERPL-MCNC: 7.1 G/DL (ref 6–8.5)
RBC # BLD AUTO: 5.12 X10E6/UL (ref 4.14–5.8)
RPR SER QL: NON REACTIVE
SODIUM SERPL-SCNC: 142 MMOL/L (ref 134–144)
TRIGL SERPL-MCNC: 181 MG/DL (ref 0–149)
VLDLC SERPL CALC-MCNC: 32 MG/DL (ref 5–40)
WBC # BLD AUTO: 5.5 X10E3/UL (ref 3.4–10.8)

## 2023-01-19 NOTE — TELEPHONE ENCOUNTER
"Reviewed NP same say visit    Noted concerns from patient were:  "Onset of bladder pressure and increased urinary frequency since this past Sunday night"    He was given cipro and urine and CT checked  Ucx negative  CT with stable nonobst stones similar to previous  Result note indicated   "His current symptoms may be due to prostate infection or bladder infection at this time.  Therefore I am still recommending him start the Cipro antibiotic that was sent in for him at this time"    --> 1. Patient does not have prostate, so csnt have prostate infection and doesn't need antibiotics for prostatitis  2. No bladder inection, urine culture negative    --> can STOP antibiotics    AND can cancel NP follow up, as per our last plan given his history of bladder neck contracture, to monitor for recurrence he planned Uroflow/PVR nurse visit on 12/30 as per last visit, planned per his request, and he canceled it    Very possible his pressure and frequency are from recurrence of his bladder neck contracture/stricture, so instead of fu NP visit --> needs the NV for uroflow/pvr he canceled so can review, as if concern for stricture recurrence, would recommend cystoscopic evaluation  *which IF needed can be on 2/20 given mardis gras break as he otherwise is limited in time off from school schedule, so schedule NV by months end for approp time to review    "

## 2023-01-20 ENCOUNTER — PATIENT MESSAGE (OUTPATIENT)
Dept: UROLOGY | Facility: CLINIC | Age: 63
End: 2023-01-20
Payer: COMMERCIAL

## 2023-03-13 DIAGNOSIS — B20 HUMAN IMMUNODEFICIENCY VIRUS (HIV) DISEASE: Primary | ICD-10-CM

## 2023-03-13 RX ORDER — EFAVIRENZ 600 MG/1
600 TABLET, FILM COATED ORAL NIGHTLY
Qty: 90 TABLET | Refills: 1 | Status: SHIPPED | OUTPATIENT
Start: 2023-03-13 | End: 2023-12-04 | Stop reason: SDUPTHER

## 2023-04-24 DIAGNOSIS — B20 HIV INFECTION, UNSPECIFIED SYMPTOM STATUS: Primary | Chronic | ICD-10-CM

## 2023-04-24 RX ORDER — EMTRICITABINE AND TENOFOVIR ALAFENAMIDE 200; 25 MG/1; MG/1
1 TABLET ORAL DAILY
Qty: 90 TABLET | Refills: 2 | Status: SHIPPED | OUTPATIENT
Start: 2023-04-24 | End: 2023-12-04 | Stop reason: SDUPTHER

## 2023-07-10 ENCOUNTER — OFFICE VISIT (OUTPATIENT)
Dept: INFECTIOUS DISEASES | Facility: CLINIC | Age: 63
End: 2023-07-10
Payer: COMMERCIAL

## 2023-07-10 VITALS
HEART RATE: 93 BPM | TEMPERATURE: 98 F | OXYGEN SATURATION: 97 % | BODY MASS INDEX: 23.41 KG/M2 | HEIGHT: 66 IN | SYSTOLIC BLOOD PRESSURE: 114 MMHG | DIASTOLIC BLOOD PRESSURE: 60 MMHG | WEIGHT: 145.63 LBS

## 2023-07-10 DIAGNOSIS — B20 HUMAN IMMUNODEFICIENCY VIRUS (HIV) DISEASE: Primary | ICD-10-CM

## 2023-07-10 DIAGNOSIS — F17.200 SMOKER: ICD-10-CM

## 2023-07-10 DIAGNOSIS — Z79.899 ENCOUNTER FOR LONG-TERM (CURRENT) USE OF MEDICATIONS: ICD-10-CM

## 2023-07-10 DIAGNOSIS — E78.00 HIGH CHOLESTEROL: ICD-10-CM

## 2023-07-10 DIAGNOSIS — R31.29 MICROSCOPIC HEMATURIA: ICD-10-CM

## 2023-07-10 PROCEDURE — 1159F MED LIST DOCD IN RCRD: CPT | Mod: CPTII,S$GLB,, | Performed by: INTERNAL MEDICINE

## 2023-07-10 PROCEDURE — 1160F RVW MEDS BY RX/DR IN RCRD: CPT | Mod: CPTII,S$GLB,, | Performed by: INTERNAL MEDICINE

## 2023-07-10 PROCEDURE — 3078F PR MOST RECENT DIASTOLIC BLOOD PRESSURE < 80 MM HG: ICD-10-PCS | Mod: CPTII,S$GLB,, | Performed by: INTERNAL MEDICINE

## 2023-07-10 PROCEDURE — 3074F PR MOST RECENT SYSTOLIC BLOOD PRESSURE < 130 MM HG: ICD-10-PCS | Mod: CPTII,S$GLB,, | Performed by: INTERNAL MEDICINE

## 2023-07-10 PROCEDURE — 3074F SYST BP LT 130 MM HG: CPT | Mod: CPTII,S$GLB,, | Performed by: INTERNAL MEDICINE

## 2023-07-10 PROCEDURE — 1159F PR MEDICATION LIST DOCUMENTED IN MEDICAL RECORD: ICD-10-PCS | Mod: CPTII,S$GLB,, | Performed by: INTERNAL MEDICINE

## 2023-07-10 PROCEDURE — 3008F PR BODY MASS INDEX (BMI) DOCUMENTED: ICD-10-PCS | Mod: CPTII,S$GLB,, | Performed by: INTERNAL MEDICINE

## 2023-07-10 PROCEDURE — 3078F DIAST BP <80 MM HG: CPT | Mod: CPTII,S$GLB,, | Performed by: INTERNAL MEDICINE

## 2023-07-10 PROCEDURE — 99214 PR OFFICE/OUTPT VISIT, EST, LEVL IV, 30-39 MIN: ICD-10-PCS | Mod: S$GLB,,, | Performed by: INTERNAL MEDICINE

## 2023-07-10 PROCEDURE — 99214 OFFICE O/P EST MOD 30 MIN: CPT | Mod: S$GLB,,, | Performed by: INTERNAL MEDICINE

## 2023-07-10 PROCEDURE — 1160F PR REVIEW ALL MEDS BY PRESCRIBER/CLIN PHARMACIST DOCUMENTED: ICD-10-PCS | Mod: CPTII,S$GLB,, | Performed by: INTERNAL MEDICINE

## 2023-07-10 PROCEDURE — 3008F BODY MASS INDEX DOCD: CPT | Mod: CPTII,S$GLB,, | Performed by: INTERNAL MEDICINE

## 2023-07-10 NOTE — PROGRESS NOTES
Subjective:       Patient ID: Jones Germain is a 63 y.o. male.    Chief Complaint:: Follow-up and HIV Positive/AIDS    Follow-up    HIV Positive/AIDS  Discussed prostate cancer diagnosis and options, admits depression. Concerned about requiring catheter, being incontinent, radiation adverse effects. He is planning to do the surgery before summer break so that he will have time to recover.  Did receive his flu vaccine. Converted to generic atripla  Compliant with meds. Still smoking. Has not yet established with primary care. Discussed that he will need clearance for surgery and anesthesia    6/27/19: he was seen by Dr. De La Torre for cardiac clearance. On 5/29 he had robotic prostatectomy, received bactrim post op and had jones for 2 weeks. Requiring no pain meds but has sensitivity over his midline abdominal incision. Tells me he will not require any additional treatment. He feels like he is emptying his bladder well. Stream is good. Rare leak. He stopped his Atripla for a week after the surgery. Very frustrated with the cost of his care and very limited in his finances. He isolates himself    12/30/19: reviewed recent hospitalization. He went in for chest pain, had a negative experience, had a stress echo which was negative and CT chest and calcium score. 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 He left AMA for personal reasons. He Has reduced cig to 1/4 ppd. Very stressed from work related issues, cost of medical care. Had a diarrheal illness for 2 weeks after that admission and [facility]. He has lots of anxiety, frustration, anger, depression underlying and prior to these events. He did not feel that the wellbutrin did much good. He is also using a nicotine patch which may be too strong for the 1/4 pack he is smoking now. His BP is high as well as pulse. He states that he measures his blood pressure at home from time to time and its ok. Discussed that HTN can make the aorta more dilated and risk  an aneurysm forming. He did not have viralload in July as requested.     6/15/20: he is protecting himself from COVID. He is socializing very little.  lexapro has helped him a great deal. He started doing yoga and he enjoys this.  He has not seen cardiologist yet.     12/30/20: since last visit, he had a fall and radiculopathy consistent with sciatica. He did have benefit from gabapentin and meloxicam but ran out. He is concerned about going back to school and COVID risk. He has not yet seen cardiology for follow up on aortic abnormality    6/28/21: sciatica is almost well, no longer gabapentin or meloxicam. Adherent to descovy/sustiva. Had to replace his roof, adding financial stress. He has not pursued cardiology eval.     12/29/21: since last visit, he had to have cysto x2 to remove bladder stones and repair a stricture. His nocturia is resolved. No incontinence any longer. He will have echo to screen thoracic aorta in July. Did have COVID booster and has not yet received influenza vaccine.   Occasional flare of sciatica. Does not feel that he needs PT. He scrubbed his kitchen floor yesterday.     6/27/22: he took a second booster for COVID. reviewed interim. Seeing cardiology and urology.    Now taking a MVI, fish oil, Mg, K, B12, zinc and he feels better. 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     1/9/23: bladder pressure, frequency, no retention prompted  visit, US with mild blood, culture negative and CT without obstruction. Given cipro for 2-3 weeks. Bladder pressure improved, seeing them again 1/27  Prior to [place], he had low grade fever, cough, runny nose, sinus congestion, chest congestion, no purulent discharge, mild mucous related wheezing. Had some mild SOB after coughing. Using some nyquil. Vicks vapo rub , but did not call. Has had his influenza vaccine.     7/10/23 :  He is doing well, off from school for the summer.  Discussed that he will be turning 64 next spring and he will be  investigating Medicare plans and supplements.  He will be grandfather in through his job to have [employer] indefinitely.  He did go to the eye doctor but not to the dentist.  He still smokes a quarter pack of cigarettes.  He is a little overdue for his urology follow-up.    Current Outpatient Medications:     aspirin 81 MG Chew, Take 1 tablet (81 mg total) by mouth once daily., Disp: 30 tablet, Rfl: 1    efavirenz (SUSTIVA) 600 mg Tab, Take 1 tablet (600 mg total) by mouth every evening., Disp: 90 tablet, Rfl: 1    emtricitabine-tenofovir alafen (DESCOVY) 200-25 mg Tab, Take 1 tablet by mouth once daily., Disp: 90 tablet, Rfl: 2    EScitalopram oxalate (LEXAPRO) 10 MG tablet, Take 1 tablet (10 mg total) by mouth once daily., Disp: 90 tablet, Rfl: 3    nitroGLYCERIN (NITROSTAT) 0.4 MG SL tablet, If you have chest pain, place 1 tablet under tongue every 5 min as needed for 3 doses.  Then call your doctor afterwards, Disp: 15 tablet, Rfl: 0    rosuvastatin (CRESTOR) 10 MG tablet, Take 1 tablet (10 mg total) by mouth once daily., Disp: 90 tablet, Rfl: 3  Review of patient's allergies indicates:  No Known Allergies  Past Medical History:   Diagnosis Date    Adverse effect of hepatitis B vaccine     non responder    Hepatitis A 1991    High frequency hearing loss     HIV (human immunodeficiency virus infection) 1995    On COM/cRix until kidney stones, sallie 200    Hypercholesterolemia     Hyperlipidemia     Kidney stone 2005    [facility], Lithotripsy    Lipoatrophy     Mild    Pertussis     As a child    Prostate cancer     prostate    Secondary syphilis 2002    Smoker     Vertigo 02/2018    Due to Ear Infection     Past Surgical History:   Procedure Laterality Date    CYSTOSCOPY N/A 8/17/2021    Procedure: CYSTOSCOPY;  Surgeon: Baljinder Johnson MD;  Location: [facility] OR;  Service: Urology;  Laterality: N/A;  unable to pass through bladder neck    CYSTOURETHROSCOPY WITH DIRECT VISION INTERNAL URETHROTOMY N/A 9/9/2021     Procedure: CYSTOSCOPY, WITH DIRECT VISION INTERNAL URETHROTOMY of bladder neck, possible dilation;  Surgeon: Baljinder Johnson MD;  Location: [facility] OR;  Service: Urology;  Laterality: N/A;    LITHOTRIPSY      ROBOT-ASSISTED LAPAROSCOPIC PROSTATECTOMY N/A 5/29/2019    Procedure: ROBOTIC PROSTATECTOMY;  Surgeon: Baljinder Johnson MD;  Location: [facility] OR;  Service: Urology;  Laterality: N/A;    TRANSRECTAL BIOPSY OF PROSTATE WITH ULTRASOUND GUIDANCE N/A 9/25/2018    Procedure: BIOPSY, PROSTATE, RECTAL APPROACH, WITH US GUIDANCE;  Surgeon: Baljinder Johnson MD;  Location: [facility] OR;  Service: Urology;  Laterality: N/A;     Social History     Socioeconomic History    Marital status: Single   Occupational History    Occupation: Teacher   Tobacco Use    Smoking status: Every Day     Packs/day: 1.00     Types: Cigarettes    Smokeless tobacco: Never    Tobacco comments:     20 to 29   Substance and Sexual Activity    Alcohol use: No    Drug use: No    Sexual activity: Never     Comment: Abstinent     Social Determinants of Health     Financial Resource Strain: Low Risk     Difficulty of Paying Living Expenses: Not very hard   Food Insecurity: No Food Insecurity    Worried About Running Out of Food in the Last Year: Never true    Ran Out of Food in the Last Year: Never true   Transportation Needs: No Transportation Needs    Lack of Transportation (Medical): No    Lack of Transportation (Non-Medical): No   Physical Activity: Insufficiently Active    Days of Exercise per Week: 2 days    Minutes of Exercise per Session: 30 min   Stress: Stress Concern Present    Feeling of Stress : To some extent   Social Connections: Unknown    Frequency of Communication with Friends and Family: Twice a week    Frequency of Social Gatherings with Friends and Family: Once a week    Active Member of Clubs or Organizations: No    Attends Club or Organization Meetings: Never    Marital Status: Never    Housing Stability: Unknown    Unable to  "Pay for Housing in the Last Year: No    Unstable Housing in the Last Year: No     Family History   Problem Relation Age of Onset    Diabetes Mother          in a MVC    COPD Father     Lung cancer Paternal Grandmother        Travel History:   Vaccine History: Yearly flu vaccine, Pneumovax , , Prevnar , tetanus 2005, Td AP , Zostavax 2016, hepatitis B ×3 , shingrix x 2 , COVID x 3 , 2022,  menactra , 2021    Advanced Directive:   Safer Sex: Abstinent  Bone Density: 2011 osteopenia  Colonoscopy: ()    Review of Systems    Constitutional: No fever, chills, sweats, fatigue, weakness,        Eyes: No change in vision, loss of vision,  UTD with eye doctor,. Prescription stable    ENT: No sinus drainage, sore throat, mouth pain, or lesions. Not UTD with dentist, finding a new dentist    Cardiovascular: No chest pain, GAVIN, palpitations or pedal edema    Respiratory: No shortness of breath, GAVIN, cough, wheeze, sputum, pleurisy, or hemoptysis. Still smoking 1/4 pack per day  . He is really trying to stop. He has been contacted by smoking cessation program but not participating.     Gastrointestinal: No abdominal pain, nausea, vomiting, diarrhea,  or focal abd pain    Genitourinary: No dysuria, hematuria, incontinence, frequency, flank pain,   Has ED. urology is aware of microscopic hematuria and he did have renal imaging 2023    Musculoskeletal: sciatica is better    Integumentary: No new rashes, lesions,   Neurological: No dizziness, vertigo, unusual headaches,     Psychiatric: he is pleased with lexapro,  continues to take this good effect    Endocrine: No diabetes Thyroid normal    Lymphatic: No lymphadenopathy, blood loss, anemia,     Objective:      Blood pressure 114/60, pulse 93, temperature 97.6 °F (36.4 °C), height 5' 6" (1.676 m), weight 66 kg (145 lb 9.6 oz), SpO2 97 %. Body mass index is 23.5 kg/m².  Physical Exam   Repeat was 126/80    General: " Alert and attentive, cooperative  .    Eyes: Pupils equal, round, reactive to light, anicteric, EOMI    Neck: Supple, non-tender, no thyromegaly or masses    ENT: EAC patent, TM normal, nares patent, no oral lesions, teeth in fair condition, no thrush    Cardiovascular: Regular rate and rhythm, no murmurs, rubs, or gallop    Respiratory: Lungs clear without wheezes, no rales, rub or rhonci    Gastrointestinal: Active bowel sounds, soft, no mass or organomegaly, no tenderness or distention    Genitourinary: No flank tenderness    Vascular: No peripheral edema, phlebitis, pulses normal. Warm and well perfused, DP/PT/Pop pulses palpable bilaterally    Musculoskeletal: Ambulates without difficulty , no acute arthritis, synovitis or myositis. Normal muscle bulk and strength     Integumentary: Skin without rashes, lesions, suspicious lesions    AnusRectum: per urology    Neurological: Normal LOC, cranial nerves, speech, reflexes, normal gait    Psychiatric: usual demeanor    Lymphatic: No cervical, supraclavicular, axillary, or inguinal lymphadenopathy      Wound:      HIV Table:   HLA-B 5701     TOXOIgG     RPR 1/2023 non reactive   HEP A IgG 6/8/2015 infection/immune   HBsAg 6/8/2015 negative   HBsAb 12/3/2016 negative, non responder   HBcAb     HBeAb     HBeAg     HCVAb 6/30/22 negative   HBV DNA     HCV RNA     Vitamin D 1/20/2018 40   Chlamydia / GC 12/5/2014 negative   TB Gold  6/30/22  negative  PSA   1/4/23  undetectable  HIV RNA  1/2023  <20  COVID IgG   6/2020  negative    Recent Diagnostics: Reviewed 2021       Assessment and Plan:           Human immunodeficiency virus (HIV) disease  -     CBC Auto Differential; Future; Expected date: 07/10/2023  -     Comprehensive Metabolic Panel; Future; Expected date: 07/10/2023  -     HIV RNA, Quantitative, PCR; Future; Expected date: 07/10/2023  -     Urinalysis; Future; Expected date: 07/10/2023  -     CD4 T-Vilas Cells; Future; Expected date: 07/10/2023    High  cholesterol  -     Lipid Panel; Future; Expected date: 07/10/2023    Microscopic hematuria  -     Urinalysis; Future; Expected date: 07/10/2023    Smoker    Encounter for long-term (current) use of medications          Same medications  Lab at [facility]    Return 5 months  He will continue to follow here in our clinic after my [facility]

## 2023-07-14 LAB
ALBUMIN SERPL-MCNC: 5 G/DL (ref 3.9–4.9)
ALBUMIN/GLOB SERPL: 2.5 {RATIO} (ref 1.2–2.2)
ALP SERPL-CCNC: 111 IU/L (ref 44–121)
ALT SERPL-CCNC: 19 IU/L (ref 0–44)
APPEARANCE UR: ABNORMAL
AST SERPL-CCNC: 18 IU/L (ref 0–40)
BACTERIA #/AREA URNS HPF: ABNORMAL /[HPF]
BASOPHILS # BLD AUTO: 0 X10E3/UL (ref 0–0.2)
BASOPHILS NFR BLD AUTO: 1 %
BILIRUB SERPL-MCNC: 0.3 MG/DL (ref 0–1.2)
BILIRUB UR QL STRIP: NEGATIVE
BUN SERPL-MCNC: 10 MG/DL (ref 8–27)
BUN/CREAT SERPL: 11 (ref 10–24)
CALCIUM SERPL-MCNC: 9.4 MG/DL (ref 8.6–10.2)
CASTS URNS QL MICRO: ABNORMAL /LPF
CD3+CD4+ CELLS # BLD: 552 /UL (ref 359–1519)
CD3+CD4+ CELLS NFR BLD: 36.8 % (ref 30.8–58.5)
CHLORIDE SERPL-SCNC: 100 MMOL/L (ref 96–106)
CHOLEST SERPL-MCNC: 180 MG/DL (ref 100–199)
CO2 SERPL-SCNC: 23 MMOL/L (ref 20–29)
COLOR UR: YELLOW
CREAT SERPL-MCNC: 0.88 MG/DL (ref 0.76–1.27)
EOSINOPHIL # BLD AUTO: 0.1 X10E3/UL (ref 0–0.4)
EOSINOPHIL NFR BLD AUTO: 2 %
EPI CELLS #/AREA URNS HPF: ABNORMAL /HPF (ref 0–10)
ERYTHROCYTE [DISTWIDTH] IN BLOOD BY AUTOMATED COUNT: 12.6 % (ref 11.6–15.4)
EST. GFR  (NO RACE VARIABLE): 96 ML/MIN/1.73
GLOBULIN SER CALC-MCNC: 2 G/DL (ref 1.5–4.5)
GLUCOSE SERPL-MCNC: 101 MG/DL (ref 70–99)
GLUCOSE UR QL STRIP: NEGATIVE
HCT VFR BLD AUTO: 48.5 % (ref 37.5–51)
HDLC SERPL-MCNC: 37 MG/DL
HGB BLD-MCNC: 16.7 G/DL (ref 13–17.7)
HGB UR QL STRIP: ABNORMAL
HIV1 RNA # SERPL NAA+PROBE: <20 COPIES/ML
HIV1 RNA SERPL NAA+PROBE-LOG#: NORMAL LOG10COPY/ML
IMM GRANULOCYTES # BLD AUTO: 0 X10E3/UL (ref 0–0.1)
IMM GRANULOCYTES NFR BLD AUTO: 1 %
KETONES UR QL STRIP: NEGATIVE
LDLC SERPL CALC-MCNC: 104 MG/DL (ref 0–99)
LEUKOCYTE ESTERASE UR QL STRIP: ABNORMAL
LYMPHOCYTES # BLD AUTO: 1.5 X10E3/UL (ref 0.7–3.1)
LYMPHOCYTES NFR BLD AUTO: 25 %
MCH RBC QN AUTO: 33 PG (ref 26.6–33)
MCHC RBC AUTO-ENTMCNC: 34.4 G/DL (ref 31.5–35.7)
MCV RBC AUTO: 96 FL (ref 79–97)
MICRO URNS: ABNORMAL
MONOCYTES # BLD AUTO: 0.5 X10E3/UL (ref 0.1–0.9)
MONOCYTES NFR BLD AUTO: 8 %
NEUTROPHILS # BLD AUTO: 4 X10E3/UL (ref 1.4–7)
NEUTROPHILS NFR BLD AUTO: 63 %
NITRITE UR QL STRIP: NEGATIVE
PH UR STRIP: 6 [PH] (ref 5–7.5)
PLATELET # BLD AUTO: 231 X10E3/UL (ref 150–450)
POTASSIUM SERPL-SCNC: 4.5 MMOL/L (ref 3.5–5.2)
PROT SERPL-MCNC: 7 G/DL (ref 6–8.5)
PROT UR QL STRIP: NEGATIVE
RBC # BLD AUTO: 5.06 X10E6/UL (ref 4.14–5.8)
RBC #/AREA URNS HPF: ABNORMAL /HPF (ref 0–2)
SODIUM SERPL-SCNC: 139 MMOL/L (ref 134–144)
SP GR UR STRIP: 1.02 (ref 1–1.03)
TRIGL SERPL-MCNC: 226 MG/DL (ref 0–149)
UROBILINOGEN UR STRIP-MCNC: 0.2 MG/DL (ref 0.2–1)
VLDLC SERPL CALC-MCNC: 39 MG/DL (ref 5–40)
WBC # BLD AUTO: 6.2 X10E3/UL (ref 3.4–10.8)
WBC #/AREA URNS HPF: ABNORMAL /HPF (ref 0–5)

## 2023-12-04 ENCOUNTER — OFFICE VISIT (OUTPATIENT)
Dept: INFECTIOUS DISEASES | Facility: CLINIC | Age: 63
End: 2023-12-04
Payer: COMMERCIAL

## 2023-12-04 VITALS — WEIGHT: 148 LBS | BODY MASS INDEX: 23.78 KG/M2 | HEIGHT: 66 IN

## 2023-12-04 DIAGNOSIS — Z21 ASYMPTOMATIC HIV INFECTION, WITH NO HISTORY OF HIV-RELATED ILLNESS: Primary | ICD-10-CM

## 2023-12-04 DIAGNOSIS — Z23 ENCOUNTER FOR IMMUNIZATION: ICD-10-CM

## 2023-12-04 DIAGNOSIS — R31.29 MICROSCOPIC HEMATURIA: ICD-10-CM

## 2023-12-04 DIAGNOSIS — Z79.899 ENCOUNTER FOR LONG-TERM (CURRENT) USE OF MEDICATIONS: ICD-10-CM

## 2023-12-04 DIAGNOSIS — E78.00 HIGH CHOLESTEROL: ICD-10-CM

## 2023-12-04 DIAGNOSIS — B20 HIV INFECTION, UNSPECIFIED SYMPTOM STATUS: Chronic | ICD-10-CM

## 2023-12-04 PROCEDURE — 99214 OFFICE O/P EST MOD 30 MIN: CPT | Mod: 25,S$GLB,, | Performed by: INTERNAL MEDICINE

## 2023-12-04 PROCEDURE — 90471 IMMUNIZATION ADMIN: CPT | Mod: S$GLB,,, | Performed by: INTERNAL MEDICINE

## 2023-12-04 PROCEDURE — 90677 PCV20 VACCINE IM: CPT | Mod: S$GLB,,, | Performed by: INTERNAL MEDICINE

## 2023-12-04 PROCEDURE — 3008F PR BODY MASS INDEX (BMI) DOCUMENTED: ICD-10-PCS | Mod: CPTII,S$GLB,, | Performed by: INTERNAL MEDICINE

## 2023-12-04 PROCEDURE — 1160F RVW MEDS BY RX/DR IN RCRD: CPT | Mod: CPTII,S$GLB,, | Performed by: INTERNAL MEDICINE

## 2023-12-04 PROCEDURE — 1159F MED LIST DOCD IN RCRD: CPT | Mod: CPTII,S$GLB,, | Performed by: INTERNAL MEDICINE

## 2023-12-04 PROCEDURE — 3008F BODY MASS INDEX DOCD: CPT | Mod: CPTII,S$GLB,, | Performed by: INTERNAL MEDICINE

## 2023-12-04 PROCEDURE — 99214 PR OFFICE/OUTPT VISIT, EST, LEVL IV, 30-39 MIN: ICD-10-PCS | Mod: 25,S$GLB,, | Performed by: INTERNAL MEDICINE

## 2023-12-04 PROCEDURE — 90471 PNEUMOCOCCAL CONJUGATE VACCINE 20-VALENT: ICD-10-PCS | Mod: S$GLB,,, | Performed by: INTERNAL MEDICINE

## 2023-12-04 PROCEDURE — 1160F PR REVIEW ALL MEDS BY PRESCRIBER/CLIN PHARMACIST DOCUMENTED: ICD-10-PCS | Mod: CPTII,S$GLB,, | Performed by: INTERNAL MEDICINE

## 2023-12-04 PROCEDURE — 1159F PR MEDICATION LIST DOCUMENTED IN MEDICAL RECORD: ICD-10-PCS | Mod: CPTII,S$GLB,, | Performed by: INTERNAL MEDICINE

## 2023-12-04 PROCEDURE — 90677 PNEUMOCOCCAL CONJUGATE VACCINE 20-VALENT: ICD-10-PCS | Mod: S$GLB,,, | Performed by: INTERNAL MEDICINE

## 2023-12-04 RX ORDER — ROSUVASTATIN CALCIUM 10 MG/1
10 TABLET, COATED ORAL DAILY
Qty: 90 TABLET | Refills: 3 | Status: SHIPPED | OUTPATIENT
Start: 2023-12-04 | End: 2024-12-03

## 2023-12-04 RX ORDER — EMTRICITABINE AND TENOFOVIR ALAFENAMIDE 200; 25 MG/1; MG/1
1 TABLET ORAL DAILY
Qty: 90 TABLET | Refills: 2 | Status: SHIPPED | OUTPATIENT
Start: 2023-12-04 | End: 2023-12-04 | Stop reason: SDUPTHER

## 2023-12-04 RX ORDER — ESCITALOPRAM OXALATE 10 MG/1
10 TABLET ORAL DAILY
Qty: 90 TABLET | Refills: 3 | Status: SHIPPED | OUTPATIENT
Start: 2023-12-04 | End: 2024-12-03

## 2023-12-04 RX ORDER — EFAVIRENZ 600 MG/1
600 TABLET, FILM COATED ORAL NIGHTLY
Qty: 90 TABLET | Refills: 2 | Status: SHIPPED | OUTPATIENT
Start: 2023-12-04 | End: 2023-12-04 | Stop reason: SDUPTHER

## 2023-12-04 RX ORDER — EMTRICITABINE AND TENOFOVIR ALAFENAMIDE 200; 25 MG/1; MG/1
1 TABLET ORAL DAILY
Qty: 90 TABLET | Refills: 2 | Status: SHIPPED | OUTPATIENT
Start: 2023-12-04 | End: 2024-03-26 | Stop reason: SDUPTHER

## 2023-12-04 RX ORDER — EFAVIRENZ 600 MG/1
600 TABLET, FILM COATED ORAL NIGHTLY
Qty: 90 TABLET | Refills: 2 | Status: SHIPPED | OUTPATIENT
Start: 2023-12-04 | End: 2024-03-26 | Stop reason: SDUPTHER

## 2023-12-04 NOTE — PATIENT INSTRUCTIONS
Please make a follow up with urology    Refills sent  Same medication    It has been my pleasure and my honor to be your physician    Your next visit will be with another provider

## 2023-12-04 NOTE — PROGRESS NOTES
Subjective:       Patient ID: Jones Germain is a 63 y.o. male.    Chief Complaint:: Follow-up and HIV Positive/AIDS    Follow-up    HIV Positive/AIDS    Discussed prostate cancer diagnosis and options, admits depression. Concerned about requiring catheter, being incontinent, radiation adverse effects. He is planning to do the surgery before summer break so that he will have time to recover.  Did receive his flu vaccine. Converted to generic atripla  Compliant with meds. Still smoking. Has not yet established with primary care. Discussed that he will need clearance for surgery and anesthesia    6/27/19: he was seen by Dr. De La Torre for cardiac clearance. On 5/29 he had robotic prostatectomy, received bactrim post op and had jones for 2 weeks. Requiring no pain meds but has sensitivity over his midline abdominal incision. Tells me he will not require any additional treatment. He feels like he is emptying his bladder well. Stream is good. Rare leak. He stopped his Atripla for a week after the surgery. Very frustrated with the cost of his care and very limited in his finances. He isolates himself    12/30/19: reviewed recent hospitalization. He went in for chest pain, had a negative experience, had a stress echo which was negative and CT chest and calcium score. 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 He left AMA for personal reasons. He Has reduced cig to 1/4 ppd. Very stressed from work related issues, cost of medical care. Had a diarrheal illness for 2 weeks after that admission and [facility]. He has lots of anxiety, frustration, anger, depression underlying and prior to these events. He did not feel that the wellbutrin did much good. He is also using a nicotine patch which may be too strong for the 1/4 pack he is smoking now. His BP is high as well as pulse. He states that he measures his blood pressure at home from time to time and its ok. Discussed that HTN can make the aorta more dilated and  risk an aneurysm forming. He did not have viralload in July as requested.     6/15/20: he is protecting himself from COVID. He is socializing very little.  lexapro has helped him a great deal. He started doing yoga and he enjoys this.  He has not seen cardiologist yet.     12/30/20: since last visit, he had a fall and radiculopathy consistent with sciatica. He did have benefit from gabapentin and meloxicam but ran out. He is concerned about going back to school and COVID risk. He has not yet seen cardiology for follow up on aortic abnormality    6/28/21: sciatica is almost well, no longer gabapentin or meloxicam. Adherent to descovy/sustiva. Had to replace his roof, adding financial stress. He has not pursued cardiology eval.     12/29/21: since last visit, he had to have cysto x2 to remove bladder stones and repair a stricture. His nocturia is resolved. No incontinence any longer. He will have echo to screen thoracic aorta in July. Did have COVID booster and has not yet received influenza vaccine.   Occasional flare of sciatica. Does not feel that he needs PT. He scrubbed his kitchen floor yesterday.     6/27/22: he took a second booster for COVID. reviewed interim. Seeing cardiology and urology.    Now taking a MVI, fish oil, Mg, K, B12, zinc and he feels better. 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     1/9/23: bladder pressure, frequency, no retention prompted  visit, US with mild blood, culture negative and CT without obstruction. Given cipro for 2-3 weeks. Bladder pressure improved, seeing them again 1/27  Prior to [place], he had low grade fever, cough, runny nose, sinus congestion, chest congestion, no purulent discharge, mild mucous related wheezing. Had some mild SOB after coughing. Using some nyquil. Vicks vapo rub , but did not call. Has had his influenza vaccine.     7/10/23 :  He is doing well, off from school for the summer.  Discussed that he will be turning 64 next spring and he will be  investigating Medicare plans and supplements.  He will be grandfathered in through his job to have [employer] indefinitely.  He did go to the eye doctor but not to the dentist.  He still smokes a quarter pack of cigarettes.  He is a little overdue for his urology follow-up.    12/4/23: last visit after 2 decades of care. Feels that he had the flu in October. Hoping to retire next year. Has not seen urology yet. To see primary soon. Still smoking , no incentive to stop.    Current Outpatient Medications:     aspirin 81 MG Chew, Take 1 tablet (81 mg total) by mouth once daily., Disp: 30 tablet, Rfl: 1    nitroGLYCERIN (NITROSTAT) 0.4 MG SL tablet, If you have chest pain, place 1 tablet under tongue every 5 min as needed for 3 doses.  Then call your doctor afterwards, Disp: 15 tablet, Rfl: 0    efavirenz (SUSTIVA) 600 mg Tab, Take 1 tablet (600 mg total) by mouth every evening., Disp: 90 tablet, Rfl: 2    emtricitabine-tenofovir alafen (DESCOVY) 200-25 mg Tab, Take 1 tablet by mouth once daily., Disp: 90 tablet, Rfl: 2    EScitalopram oxalate (LEXAPRO) 10 MG tablet, Take 1 tablet (10 mg total) by mouth once daily., Disp: 90 tablet, Rfl: 3    rosuvastatin (CRESTOR) 10 MG tablet, Take 1 tablet (10 mg total) by mouth once daily., Disp: 90 tablet, Rfl: 3  Review of patient's allergies indicates:  No Known Allergies  Past Medical History:   Diagnosis Date    Adverse effect of hepatitis B vaccine     non responder    Hepatitis A 1991    High frequency hearing loss     HIV (human immunodeficiency virus infection) 1995    On COM/cRix until kidney stones, sallie 200    Hypercholesterolemia     Hyperlipidemia     Kidney stone 2005    [facility], Lithotripsy    Lipoatrophy     Mild    Pertussis     As a child    Prostate cancer     prostate    Secondary syphilis 2002    Smoker     Vertigo 02/2018    Due to Ear Infection     Past Surgical History:   Procedure Laterality Date    CYSTOSCOPY N/A 8/17/2021    Procedure: CYSTOSCOPY;  Surgeon:  Baljinder Johnson MD;  Location: [facility] OR;  Service: Urology;  Laterality: N/A;  unable to pass through bladder neck    CYSTOURETHROSCOPY WITH DIRECT VISION INTERNAL URETHROTOMY N/A 9/9/2021    Procedure: CYSTOSCOPY, WITH DIRECT VISION INTERNAL URETHROTOMY of bladder neck, possible dilation;  Surgeon: Baljinder Johnson MD;  Location: [facility] OR;  Service: Urology;  Laterality: N/A;    LITHOTRIPSY      ROBOT-ASSISTED LAPAROSCOPIC PROSTATECTOMY N/A 5/29/2019    Procedure: ROBOTIC PROSTATECTOMY;  Surgeon: Baljinder Johnson MD;  Location: [facility] OR;  Service: Urology;  Laterality: N/A;    TRANSRECTAL BIOPSY OF PROSTATE WITH ULTRASOUND GUIDANCE N/A 9/25/2018    Procedure: BIOPSY, PROSTATE, RECTAL APPROACH, WITH US GUIDANCE;  Surgeon: Baljinder Johnson MD;  Location: [facility] OR;  Service: Urology;  Laterality: N/A;     Social History     Socioeconomic History    Marital status: Single   Occupational History    Occupation: Teacher   Tobacco Use    Smoking status: Every Day     Current packs/day: 1.00     Types: Cigarettes    Smokeless tobacco: Never    Tobacco comments:     20 to 30   Substance and Sexual Activity    Alcohol use: No    Drug use: No    Sexual activity: Never     Comment: Abstinent     Social Determinants of Health     Financial Resource Strain: Low Risk  (7/20/2022)    Overall Financial Resource Strain (CARDIA)     Difficulty of Paying Living Expenses: Not very hard   Food Insecurity: No Food Insecurity (7/20/2022)    Hunger Vital Sign     Worried About Running Out of Food in the Last Year: Never true     Ran Out of Food in the Last Year: Never true   Transportation Needs: No Transportation Needs (7/20/2022)    PRAPARE - Transportation     Lack of Transportation (Medical): No     Lack of Transportation (Non-Medical): No   Physical Activity: Insufficiently Active (7/20/2022)    Exercise Vital Sign     Days of Exercise per Week: 2 days     Minutes of Exercise per Session: 30 min   Stress: Stress Concern Present  (2022)    [employer] - Occupational Stress Questionnaire     Feeling of Stress : To some extent   Social Connections: Unknown (2022)    Social Connection and Isolation Panel [NHANES]     Frequency of Communication with Friends and Family: Twice a week     Frequency of Social Gatherings with Friends and Family: Once a week     Active Member of Clubs or Organizations: No     Attends Club or Organization Meetings: Never     Marital Status: Never    Housing Stability: Unknown (2022)    Housing Stability Vital Sign     Unable to Pay for Housing in the Last Year: No     Unstable Housing in the Last Year: No     Family History   Problem Relation Age of Onset    Diabetes Mother          in a MVC    COPD Father     Lung cancer Paternal Grandmother        Travel History:   Vaccine History: Yearly flu vaccine, Pneumovax , , Prevnar , tetanus , Td AP , Zostavax 2016, hepatitis B ×3 , shingrix x 2 , COVID x 3 , 2022,  menactra , 2021    Advanced Directive:   Safer Sex: Abstinent  Bone Density: 2011 osteopenia  Colonoscopy: ()    Review of Systems    Constitutional: No fever, chills, sweats, fatigue, weakness,        Eyes: No change in vision, loss of vision,  UTD with eye doctor,. Prescription stable    ENT: No sinus drainage, sore throat, mouth pain, or lesions.  UTD with dentist, needs a scaling which they will do in feb    Cardiovascular: No chest pain, GAVIN, palpitations or pedal edema    Respiratory: No shortness of breath, GAVIN, cough, wheeze, sputum, pleurisy, or hemoptysis. Still smoking 1/4-1/2 pack per day  . He is ? trying to stop. Not interested in smoking cessation program      Gastrointestinal: No abdominal pain, nausea, vomiting, diarrhea,  or focal abd pain    Genitourinary: No dysuria, hematuria, incontinence, frequency, flank pain,   Has ED. urology is aware of microscopic hematuria and he did have  "renal imaging January 2023    Musculoskeletal: no new aches, pains or injuries    Integumentary: No new rashes, lesions,   Neurological: No dizziness, vertigo, unusual headaches,     Psychiatric: he is pleased with lexapro,  continues to take this with good effect    Endocrine: No diabetes Thyroid normal    Lymphatic: No lymphadenopathy, blood loss, anemia,     Objective:      Blood pressure (P) 118/68, pulse (P) 100, temperature (P) 98 °F (36.7 °C), height 5' 6" (1.676 m), weight 67.1 kg (148 lb). Body mass index is 23.89 kg/m².  Physical Exam       General: Alert and attentive, cooperative  .    Eyes: Pupils equal, round, reactive to light, anicteric, EOMI    Neck: Supple, non-tender, no thyromegaly or masses    ENT: EAC patent, TM normal, nares patent, no oral lesions, teeth in fair condition, no thrush    Cardiovascular: Regular rate and rhythm, no murmurs, rubs, or gallop    Respiratory: Lungs clear without wheezes, no rales, rub or rhonci    Gastrointestinal: Active bowel sounds, soft, no mass or organomegaly, no tenderness or distention    Genitourinary: No flank tenderness    Vascular: No peripheral edema, phlebitis, pulses normal. Warm and well perfused, DP/PT/Pop pulses palpable bilaterally    Musculoskeletal: Ambulates without difficulty , no acute arthritis, synovitis or myositis. Normal muscle bulk and strength     Integumentary: Skin without rashes, lesions, suspicious lesions    AnusRectum: per urology    Neurological: Normal LOC, cranial nerves, speech, reflexes, normal gait    Psychiatric: usual demeanor    Lymphatic: No cervical, supraclavicular, axillary, or inguinal lymphadenopathy      Wound:      HIV Table:   HLA-B 5701 12/2023    TOXOIgG     RPR 1/2023 non reactive   HEP A IgG 6/8/2015 infection/immune   HBsAg 6/8/2015 negative   HBsAb 12/3/2016 negative, non responder   HBcAb     HBeAb     HBeAg     HCVAb 6/30/22 negative   HBV DNA     HCV RNA     Vitamin D 1/20/2018 40   Chlamydia / GC " 12/5/2014 negative   TB Gold  6/30/22  negative  PSA   1/4/23  undetectable  HIV RNA  7/2023  <20  COVID IgG   6/2020  Negative  CD4   7/2023  554    Recent Diagnostics: Reviewed 2021       Assessment and Plan:           Asymptomatic HIV infection, with no history of HIV-related illness  -     Discontinue: efavirenz (SUSTIVA) 600 mg Tab; Take 1 tablet (600 mg total) by mouth every evening.  Dispense: 90 tablet; Refill: 2  -     efavirenz (SUSTIVA) 600 mg Tab; Take 1 tablet (600 mg total) by mouth every evening.  Dispense: 90 tablet; Refill: 2  -     Comprehensive Metabolic Panel; Future; Expected date: 12/04/2023  -     Lipid Panel; Future; Expected date: 12/04/2023  -     CBC Auto Differential; Future; Expected date: 12/04/2023  -     HIV RNA, Quantitative, PCR; Future; Expected date: 12/04/2023  -     Urinalysis; Future; Expected date: 12/04/2023  -     RPR; Future; Expected date: 12/04/2023  -     Quantiferon Gold TB; Future; Expected date: 12/04/2023  -     Hepatitis B Surface AB, Quantitative; Future; Expected date: 12/04/2023    HIV infection, unspecified symptom status  -     Discontinue: emtricitabine-tenofovir alafen (DESCOVY) 200-25 mg Tab; Take 1 tablet by mouth once daily.  Dispense: 90 tablet; Refill: 2  -     emtricitabine-tenofovir alafen (DESCOVY) 200-25 mg Tab; Take 1 tablet by mouth once daily.  Dispense: 90 tablet; Refill: 2    High cholesterol  -     Lipid Panel; Future; Expected date: 12/04/2023    Encounter for immunization    Microscopic hematuria    Encounter for long-term (current) use of medications    Other orders  -     rosuvastatin (CRESTOR) 10 MG tablet; Take 1 tablet (10 mg total) by mouth once daily.  Dispense: 90 tablet; Refill: 3  -     EScitalopram oxalate (LEXAPRO) 10 MG tablet; Take 1 tablet (10 mg total) by mouth once daily.  Dispense: 90 tablet; Refill: 3  -     (In Office Administered) Pneumococcal Conjugate Vaccine (20 Valent) (IM) (Preferred)         Please make a follow up  with urology    Refills sent  Same medication    It has been my pleasure and my honor to be your physician    Your next visit will be with another provider,

## 2023-12-15 LAB
ALBUMIN SERPL-MCNC: 4.7 G/DL (ref 3.9–4.9)
ALBUMIN/GLOB SERPL: 2.4 {RATIO} (ref 1.2–2.2)
ALP SERPL-CCNC: 110 IU/L (ref 44–121)
ALT SERPL-CCNC: 21 IU/L (ref 0–44)
APPEARANCE UR: CLEAR
AST SERPL-CCNC: 17 IU/L (ref 0–40)
BASOPHILS # BLD AUTO: 0 X10E3/UL (ref 0–0.2)
BASOPHILS NFR BLD AUTO: 1 %
BILIRUB SERPL-MCNC: 0.3 MG/DL (ref 0–1.2)
BILIRUB UR QL STRIP: NEGATIVE
BUN SERPL-MCNC: 10 MG/DL (ref 8–27)
BUN/CREAT SERPL: 11 (ref 10–24)
CALCIUM SERPL-MCNC: 9.5 MG/DL (ref 8.6–10.2)
CHLORIDE SERPL-SCNC: 102 MMOL/L (ref 96–106)
CHOLEST SERPL-MCNC: 174 MG/DL (ref 100–199)
CO2 SERPL-SCNC: 21 MMOL/L (ref 20–29)
COLOR UR: YELLOW
CREAT SERPL-MCNC: 0.87 MG/DL (ref 0.76–1.27)
EOSINOPHIL # BLD AUTO: 0.1 X10E3/UL (ref 0–0.4)
EOSINOPHIL NFR BLD AUTO: 2 %
ERYTHROCYTE [DISTWIDTH] IN BLOOD BY AUTOMATED COUNT: 12.5 % (ref 11.6–15.4)
EST. GFR  (NO RACE VARIABLE): 96 ML/MIN/1.73
GAMMA INTERFERON BACKGROUND BLD IA-ACNC: 0.04 IU/ML
GLOBULIN SER CALC-MCNC: 2 G/DL (ref 1.5–4.5)
GLUCOSE SERPL-MCNC: 100 MG/DL (ref 70–99)
GLUCOSE UR QL STRIP: NEGATIVE
HBV SURFACE AB SER-ACNC: <3.1 MIU/ML
HCT VFR BLD AUTO: 47.3 % (ref 37.5–51)
HDLC SERPL-MCNC: 36 MG/DL
HGB BLD-MCNC: 15.8 G/DL (ref 13–17.7)
HGB UR QL STRIP: NEGATIVE
HIV1 RNA # SERPL NAA+PROBE: <20 COPIES/ML
HIV1 RNA SERPL NAA+PROBE-LOG#: NORMAL LOG10COPY/ML
IMM GRANULOCYTES # BLD AUTO: 0 X10E3/UL (ref 0–0.1)
IMM GRANULOCYTES NFR BLD AUTO: 1 %
KETONES UR QL STRIP: NEGATIVE
LDLC SERPL CALC-MCNC: 109 MG/DL (ref 0–99)
LEUKOCYTE ESTERASE UR QL STRIP: NEGATIVE
LYMPHOCYTES # BLD AUTO: 1.4 X10E3/UL (ref 0.7–3.1)
LYMPHOCYTES NFR BLD AUTO: 28 %
M TB IFN-G BLD-IMP: NEGATIVE
M TB IFN-G CD4+ T-CELLS BLD-ACNC: 0.07 IU/ML
M TBIFN-G CD4+ CD8+T-CELLS BLD-ACNC: 0.07 IU/ML
MCH RBC QN AUTO: 32.8 PG (ref 26.6–33)
MCHC RBC AUTO-ENTMCNC: 33.4 G/DL (ref 31.5–35.7)
MCV RBC AUTO: 98 FL (ref 79–97)
MICRO URNS: NORMAL
MITOGEN IGNF BLD-ACNC: >10 IU/ML
MONOCYTES # BLD AUTO: 0.4 X10E3/UL (ref 0.1–0.9)
MONOCYTES NFR BLD AUTO: 8 %
NEUTROPHILS # BLD AUTO: 3 X10E3/UL (ref 1.4–7)
NEUTROPHILS NFR BLD AUTO: 60 %
NITRITE UR QL STRIP: NEGATIVE
PH UR STRIP: 6.5 [PH] (ref 5–7.5)
PLATELET # BLD AUTO: 222 X10E3/UL (ref 150–450)
POTASSIUM SERPL-SCNC: 4.8 MMOL/L (ref 3.5–5.2)
PROT SERPL-MCNC: 6.7 G/DL (ref 6–8.5)
PROT UR QL STRIP: NEGATIVE
QUANTIFERON TB GOLD (INCUBATED): NORMAL
RBC # BLD AUTO: 4.82 X10E6/UL (ref 4.14–5.8)
RPR SER QL: NON REACTIVE
SERVICE CMNT-IMP: NORMAL
SODIUM SERPL-SCNC: 140 MMOL/L (ref 134–144)
SP GR UR STRIP: 1.02 (ref 1–1.03)
TRIGL SERPL-MCNC: 163 MG/DL (ref 0–149)
UROBILINOGEN UR STRIP-MCNC: 0.2 MG/DL (ref 0.2–1)
VLDLC SERPL CALC-MCNC: 29 MG/DL (ref 5–40)
WBC # BLD AUTO: 4.9 X10E3/UL (ref 3.4–10.8)

## 2023-12-18 ENCOUNTER — PATIENT MESSAGE (OUTPATIENT)
Dept: INFECTIOUS DISEASES | Facility: CLINIC | Age: 63
End: 2023-12-18

## 2024-03-26 DIAGNOSIS — Z21 ASYMPTOMATIC HIV INFECTION, WITH NO HISTORY OF HIV-RELATED ILLNESS: Primary | ICD-10-CM

## 2024-03-26 DIAGNOSIS — B20 HIV INFECTION, UNSPECIFIED SYMPTOM STATUS: Chronic | ICD-10-CM

## 2024-03-26 RX ORDER — EFAVIRENZ 600 MG/1
600 TABLET, FILM COATED ORAL NIGHTLY
Qty: 90 TABLET | Refills: 2 | Status: SHIPPED | OUTPATIENT
Start: 2024-03-26 | End: 2025-03-26

## 2024-03-26 RX ORDER — EMTRICITABINE AND TENOFOVIR ALAFENAMIDE 200; 25 MG/1; MG/1
1 TABLET ORAL DAILY
Qty: 90 TABLET | Refills: 2 | Status: SHIPPED | OUTPATIENT
Start: 2024-03-26

## 2024-06-05 ENCOUNTER — TELEPHONE (OUTPATIENT)
Dept: UROLOGY | Facility: CLINIC | Age: 64
End: 2024-06-05
Payer: COMMERCIAL

## 2024-06-05 DIAGNOSIS — Z85.46 HISTORY OF PROSTATE CANCER: Primary | ICD-10-CM

## 2024-06-05 NOTE — TELEPHONE ENCOUNTER
Spoke w pt who c/o passing and stone and poss uti   Pt was offered appt with NP  Pt accepted and was scheduled for 6/6

## 2024-06-05 NOTE — TELEPHONE ENCOUNTER
----- Message from Cinthya Grant sent at 6/5/2024  9:15 AM CDT -----  Regarding: Same day appt request  Contact: pt  Type:  Same Day Appointment Request    Caller is requesting a same day appointment.  Caller declined first available appointment listed below.      Name of Caller:pt    When is the first available appointment?sept    Symptoms:pt has a UTI and passed a small kidney stone    Would the PT rather a call back or a response via [facility]? Call back    Best Call Back Number:[phone]    Additional Information: pt wishing to be seen today.  Please advise.  Thank you.

## 2024-06-06 ENCOUNTER — OFFICE VISIT (OUTPATIENT)
Dept: UROLOGY | Facility: CLINIC | Age: 64
End: 2024-06-06
Payer: COMMERCIAL

## 2024-06-06 ENCOUNTER — HOSPITAL ENCOUNTER (OUTPATIENT)
Dept: RADIOLOGY | Facility: HOSPITAL | Age: 64
Discharge: HOME | End: 2024-06-06
Attending: NURSE PRACTITIONER
Payer: COMMERCIAL

## 2024-06-06 DIAGNOSIS — R39.9 UTI SYMPTOMS: ICD-10-CM

## 2024-06-06 DIAGNOSIS — R10.9 ABDOMINAL PAIN, UNSPECIFIED ABDOMINAL LOCATION: ICD-10-CM

## 2024-06-06 DIAGNOSIS — Z85.46 HISTORY OF PROSTATE CANCER: ICD-10-CM

## 2024-06-06 DIAGNOSIS — N20.0 KIDNEY STONES: Primary | ICD-10-CM

## 2024-06-06 LAB
BACTERIA #/AREA URNS AUTO: NORMAL /HPF
BILIRUBIN, UA POC OHS: NEGATIVE
BLOOD, UA POC OHS: ABNORMAL
CLARITY, UA POC OHS: CLEAR
COLOR, UA POC OHS: YELLOW
GLUCOSE, UA POC OHS: NEGATIVE
KETONES, UA POC OHS: NEGATIVE
LEUKOCYTES, UA POC OHS: NEGATIVE
MICROSCOPIC COMMENT: NORMAL
NITRITE, UA POC OHS: NEGATIVE
PH, UA POC OHS: 5.5
PROTEIN, UA POC OHS: NEGATIVE
RBC #/AREA URNS AUTO: 3 /HPF (ref 0–4)
SPECIFIC GRAVITY, UA POC OHS: 1.02
UROBILINOGEN, UA POC OHS: 0.2
WBC #/AREA URNS AUTO: 0 /HPF (ref 0–5)

## 2024-06-06 PROCEDURE — 99214 OFFICE O/P EST MOD 30 MIN: CPT | Mod: S$GLB,,, | Performed by: NURSE PRACTITIONER

## 2024-06-06 PROCEDURE — 81003 URINALYSIS AUTO W/O SCOPE: CPT | Mod: QW,S$GLB,, | Performed by: NURSE PRACTITIONER

## 2024-06-06 PROCEDURE — 1159F MED LIST DOCD IN RCRD: CPT | Mod: CPTII,S$GLB,, | Performed by: NURSE PRACTITIONER

## 2024-06-06 PROCEDURE — 74176 CT ABD & PELVIS W/O CONTRAST: CPT | Mod: 26,,, | Performed by: RADIOLOGY

## 2024-06-06 PROCEDURE — 1160F RVW MEDS BY RX/DR IN RCRD: CPT | Mod: CPTII,S$GLB,, | Performed by: NURSE PRACTITIONER

## 2024-06-06 PROCEDURE — 87086 URINE CULTURE/COLONY COUNT: CPT | Performed by: NURSE PRACTITIONER

## 2024-06-06 PROCEDURE — 3288F FALL RISK ASSESSMENT DOCD: CPT | Mod: CPTII,S$GLB,, | Performed by: NURSE PRACTITIONER

## 2024-06-06 PROCEDURE — 81001 URINALYSIS AUTO W/SCOPE: CPT | Performed by: NURSE PRACTITIONER

## 2024-06-06 PROCEDURE — 99999 PR PBB SHADOW E&M-EST. PATIENT-LVL III: CPT | Mod: PBBFAC,,, | Performed by: NURSE PRACTITIONER

## 2024-06-06 PROCEDURE — 1101F PT FALLS ASSESS-DOCD LE1/YR: CPT | Mod: CPTII,S$GLB,, | Performed by: NURSE PRACTITIONER

## 2024-06-06 PROCEDURE — 74176 CT ABD & PELVIS W/O CONTRAST: CPT | Mod: TC

## 2024-06-06 RX ORDER — TAMSULOSIN HYDROCHLORIDE 0.4 MG/1
0.4 CAPSULE ORAL DAILY PRN
Qty: 30 CAPSULE | Refills: 0 | Status: SHIPPED | OUTPATIENT
Start: 2024-06-06 | End: 2024-07-06

## 2024-06-06 RX ORDER — SULFAMETHOXAZOLE AND TRIMETHOPRIM 800; 160 MG/1; MG/1
1 TABLET ORAL 2 TIMES DAILY
Qty: 42 TABLET | Refills: 0 | Status: SHIPPED | OUTPATIENT
Start: 2024-06-06 | End: 2024-06-27

## 2024-06-06 RX ORDER — KETOROLAC TROMETHAMINE 10 MG/1
10 TABLET, FILM COATED ORAL EVERY 6 HOURS PRN
Qty: 10 TABLET | Refills: 0 | Status: SHIPPED | OUTPATIENT
Start: 2024-06-06 | End: 2024-06-11

## 2024-06-06 NOTE — PROGRESS NOTES
[facility] Note  Staff: LUDIVINA Tamez    PCP: MD Artemio    Chief Complaint: UTI symptoms/Kidney stone    Subjective:        HPI: Jones Germain is a 64 y.o. male presents today for evaluation of bladder pain at this time.    Pt was last evaluated by Dr. Johnson on 7/12/2022.  Hx prostate ca follow up, with history BNC     Prostate biopsy 9/2018 revealing a 57.3g gland with 5/15 cores aiden 6 prostate cancer, largely left sided, with small volume on right. Diagnosed with L prostate nodule and psa 2.0. He initially elected surveillancebut 6 months later had interval psa rise to 2.7 and progression of his LUTS with AUA SS 17/5. He elected to proceed with robotic assisted radical prostatectomy     5/29/19: Robot-assisted laparoscopic radical prostatectomy, right nerve sparing. Bilateral pelvic lymphadenectomy. Significant median lobe with BN reconstruction  PATHOLOGY: [id number] aiden 3+3 negative margin     By 4 mos postop was just wearing pad for safety but no sig IMMANUEL and continued smoking. PSA has been undetectable postop. By 7/2020 no leakage no pads, occ slow stream occ urgency.  Udips continued to have blood, though UA micro only had 1rbc in 7/2020, but CaOx crystalluria so got KUB. Hx stones last episode in 2005  KUB 7/2020: multiple small 1-2 mm and a 4 mm calcification overlying the left kidney consistent with intrarenal stones.  There is at least 1 right intrarenal stone identified.  Calcifications in the expected course of either ureter is not seen. He deferred CT in favor of adhering to stone prevention recs and repeating KUB at time of 6 mos psa and following up with NP     KUB 1/21: There are several stones in the left mid abdomen, at least 3 in number with largest 5 mm.  He saw NP after that Xray, at which time psa undetectable, in Jan 2021 noting UA with mod blood this time. Advised CT and possible cysto if true MH and UA micro had 75 rbc/hpf though no further workup  done. UA micro weeks before that with 4 rbc.     8/2021: PSA <0.01, Still smoking about 1/2 ppd and smoking since age 17, Still continent, no pads no leakage. Still no erectile function and ok with it., no GH but +MH and CT with small bilat kidney stones and a few small bladder stones. Suspicious cytology. Cysto dxed NC     9/9/21: Cystoscopy with release of bladder neck contracture via direct vision internal urethrotomy and removal multiple bladder stones  11/22/21 uroflow:  no significant intermittency.  Equivocal for obstruction but no intermittent spikes which would be concerning for early stricture or bladder neck contracture recurrence.   He reports no bothersome voiding symptoms and empties well     OV 07/12/22:  No trouble voiding.  No weak stream.  No hematuria.  No incontinence.  Nocturia x1 but if does not drink at night, times 0.  Trace blood on urine dipstick persists.  PSA less than 0.01, undetectable on 7/5/22     OV January 2023:  UA today showed small blood, otherwise normal findings.  No gross hematuria, no dysuria noted by pt.    OV 6/6/24:  Pt presents in office today.  Thinks he either has a UTI or trying to pass a stone.  Pt c/o bladder pain and urinary frequency for the past few days.  As of this am, pain subsided   UA today showed moderate blood, otherwise WNL.  No problems with urination noted by pt.  Pt denies gross hematuria    REVIEW OF SYSTEMS:  A comprehensive 10 system review was performed and is negative except as noted above in HPI    PMHx:  Past Medical History:   Diagnosis Date    Adverse effect of hepatitis B vaccine     non responder    Hepatitis A 1991    High frequency hearing loss     HIV (human immunodeficiency virus infection) 1995    On COM/cRix until kidney stones, sallie 200    Hypercholesterolemia     Hyperlipidemia     Kidney stone 2005    [facility], Lithotripsy    Lipoatrophy     Mild    Pertussis     As a child    Prostate cancer     prostate    Secondary syphilis 2002     Smoker     Vertigo 02/2018    Due to Ear Infection     PSHx:  Past Surgical History:   Procedure Laterality Date    CYSTOSCOPY N/A 8/17/2021    Procedure: CYSTOSCOPY;  Surgeon: Baljinder Johnson MD;  Location: [facility] OR;  Service: Urology;  Laterality: N/A;  unable to pass through bladder neck    CYSTOURETHROSCOPY WITH DIRECT VISION INTERNAL URETHROTOMY N/A 9/9/2021    Procedure: CYSTOSCOPY, WITH DIRECT VISION INTERNAL URETHROTOMY of bladder neck, possible dilation;  Surgeon: Baljinder Johnson MD;  Location: [facility] OR;  Service: Urology;  Laterality: N/A;    LITHOTRIPSY      ROBOT-ASSISTED LAPAROSCOPIC PROSTATECTOMY N/A 5/29/2019    Procedure: ROBOTIC PROSTATECTOMY;  Surgeon: Baljinder Johnson MD;  Location: [facility] OR;  Service: Urology;  Laterality: N/A;    TRANSRECTAL BIOPSY OF PROSTATE WITH ULTRASOUND GUIDANCE N/A 9/25/2018    Procedure: BIOPSY, PROSTATE, RECTAL APPROACH, WITH US GUIDANCE;  Surgeon: Baljinder Johnson MD;  Location: [facility] OR;  Service: Urology;  Laterality: N/A;     Allergies:  Patient has no known allergies.    Medications: reviewed     Objective:   There were no vitals filed for this visit.    General:WDWN in NAD  Eyes: PERRLA, normal conjunctiva  Respiratory: no increased work on breathing, clear to auscultation  Cardiovascular: regular rate and rhythm. No obvious extremity edema.  GI: palpation of masses. No tenderness. No hepatosplenomegaly to palpation.  Musculoskeletal: normal range of motion of bilateral upper extremities. Normal muscle strength and tone.  Skin: no obvious rashes or lesions. No tightening of skin noted.  Neurologic: CN grossly normal. Normal sensation.   Psychiatric: awake, alert and oriented x 3. Mood and affect normal. Cooperative.    Assessment:       1. Kidney stones    2. History of prostate cancer    3. UTI symptoms    4. Abdominal pain, unspecified abdominal location          Plan:     UA Micro and Urine Culture to be processed today.  In the meantime, Bactrim DS BID  prescribed to this pt today.  CT Renal Stone Study to be completed to rule out any stone/obstruction at this time.  Flomax 0.4 mg one capsule daily prescribed to pt prn stone trial passage.  Increase p.o. fluids at this time.    F/u--We will contact pt after we review pending imaging for further plan of care.      Ondina Castillo, QUYEN-C

## 2024-06-07 LAB — BACTERIA UR CULT: NO GROWTH

## 2024-06-12 NOTE — TELEPHONE ENCOUNTER
Np visit and CT reviewed  Noted small distal stone, agree likely to pass (aka pass from ureter into bladder)  Can note distal stones cause urgency/frequency (aka uti symptoms)  HOWEVER has history of bladder neck contracture post prostatectomy of which had bladder stones behind  - this was managed so should also be able to PASS (urinate out) stone  BUT SHOULD strain all urine until repeat imaging  If doesn't have stone strainer should pick one up  And get repeat imaging as planned in 2 weeks on trial of passage  If passes stone before and collects in strainer, bring in for analysis    Set 6 mos fu with me with PSA diagnostic 1 week prior as backup/follow up - and will fu results of trial of passage

## 2024-08-27 ENCOUNTER — OFFICE VISIT (OUTPATIENT)
Dept: INFECTIOUS DISEASES | Facility: CLINIC | Age: 64
End: 2024-08-27
Payer: COMMERCIAL

## 2024-08-27 VITALS
WEIGHT: 150.38 LBS | HEIGHT: 66 IN | OXYGEN SATURATION: 97 % | BODY MASS INDEX: 24.17 KG/M2 | HEART RATE: 97 BPM | SYSTOLIC BLOOD PRESSURE: 128 MMHG | DIASTOLIC BLOOD PRESSURE: 62 MMHG

## 2024-08-27 DIAGNOSIS — Z21 ASYMPTOMATIC HIV INFECTION, WITH NO HISTORY OF HIV-RELATED ILLNESS: Primary | ICD-10-CM

## 2024-08-27 DIAGNOSIS — E78.00 HIGH CHOLESTEROL: ICD-10-CM

## 2024-08-27 DIAGNOSIS — B20 HIV INFECTION, UNSPECIFIED SYMPTOM STATUS: Chronic | ICD-10-CM

## 2024-08-27 DIAGNOSIS — Z79.899 LIPODYSTROPHY DUE TO HIV INFECTION AND ANTIRETROVIRAL THERAPY: ICD-10-CM

## 2024-08-27 DIAGNOSIS — B20 LIPODYSTROPHY DUE TO HIV INFECTION AND ANTIRETROVIRAL THERAPY: ICD-10-CM

## 2024-08-27 DIAGNOSIS — E88.1 LIPODYSTROPHY DUE TO HIV INFECTION AND ANTIRETROVIRAL THERAPY: ICD-10-CM

## 2024-08-27 PROCEDURE — 1101F PT FALLS ASSESS-DOCD LE1/YR: CPT | Mod: CPTII,S$GLB,, | Performed by: INTERNAL MEDICINE

## 2024-08-27 PROCEDURE — 3288F FALL RISK ASSESSMENT DOCD: CPT | Mod: CPTII,S$GLB,, | Performed by: INTERNAL MEDICINE

## 2024-08-27 PROCEDURE — 3008F BODY MASS INDEX DOCD: CPT | Mod: CPTII,S$GLB,, | Performed by: INTERNAL MEDICINE

## 2024-08-27 PROCEDURE — 99999 PR PBB SHADOW E&M-EST. PATIENT-LVL III: CPT | Mod: PBBFAC,,, | Performed by: INTERNAL MEDICINE

## 2024-08-27 PROCEDURE — 1159F MED LIST DOCD IN RCRD: CPT | Mod: CPTII,S$GLB,, | Performed by: INTERNAL MEDICINE

## 2024-08-27 PROCEDURE — 99214 OFFICE O/P EST MOD 30 MIN: CPT | Mod: S$GLB,,, | Performed by: INTERNAL MEDICINE

## 2024-08-27 PROCEDURE — 3078F DIAST BP <80 MM HG: CPT | Mod: CPTII,S$GLB,, | Performed by: INTERNAL MEDICINE

## 2024-08-27 PROCEDURE — 3074F SYST BP LT 130 MM HG: CPT | Mod: CPTII,S$GLB,, | Performed by: INTERNAL MEDICINE

## 2024-08-27 RX ORDER — EFAVIRENZ 600 MG/1
600 TABLET, FILM COATED ORAL NIGHTLY
Qty: 90 TABLET | Refills: 3 | Status: SHIPPED | OUTPATIENT
Start: 2024-08-27 | End: 2025-08-27

## 2024-08-27 RX ORDER — EMTRICITABINE AND TENOFOVIR ALAFENAMIDE 200; 25 MG/1; MG/1
1 TABLET ORAL DAILY
Qty: 90 TABLET | Refills: 3 | Status: SHIPPED | OUTPATIENT
Start: 2024-08-27

## 2024-08-27 NOTE — PROGRESS NOTES
Subjective:       Patient ID: Jones Germain is a 64 y.o. male.    Chief Complaint:: Follow-up (6 month follow up)    Follow-up    HIV Positive/AIDS    Discussed prostate cancer diagnosis and options, admits depression. Concerned about requiring catheter, being incontinent, radiation adverse effects. He is planning to do the surgery before summer break so that he will have time to recover.  Did receive his flu vaccine. Converted to generic atripla  Compliant with meds. Still smoking. Has not yet established with primary care. Discussed that he will need clearance for surgery and anesthesia    6/27/19: he was seen by Dr. De La Torre for cardiac clearance. On 5/29 he had robotic prostatectomy, received bactrim post op and had jones for 2 weeks. Requiring no pain meds but has sensitivity over his midline abdominal incision. Tells me he will not require any additional treatment. He feels like he is emptying his bladder well. Stream is good. Rare leak. He stopped his Atripla for a week after the surgery. Very frustrated with the cost of his care and very limited in his finances. He isolates himself    12/30/19: reviewed recent hospitalization. He went in for chest pain, had a negative experience, had a stress echo which was negative and CT chest and calcium score. 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 He left AMA for personal reasons. He Has reduced cig to 1/4 ppd. Very stressed from work related issues, cost of medical care. Had a diarrheal illness for 2 weeks after that admission and [facility]. He has lots of anxiety, frustration, anger, depression underlying and prior to these events. He did not feel that the wellbutrin did much good. He is also using a nicotine patch which may be too strong for the 1/4 pack he is smoking now. His BP is high as well as pulse. He states that he measures his blood pressure at home from time to time and its ok. Discussed that HTN can make the aorta more dilated and risk  an aneurysm forming. He did not have viralload in July as requested.     6/15/20: he is protecting himself from COVID. He is socializing very little.  lexapro has helped him a great deal. He started doing yoga and he enjoys this.  He has not seen cardiologist yet.     12/30/20: since last visit, he had a fall and radiculopathy consistent with sciatica. He did have benefit from gabapentin and meloxicam but ran out. He is concerned about going back to school and COVID risk. He has not yet seen cardiology for follow up on aortic abnormality    6/28/21: sciatica is almost well, no longer gabapentin or meloxicam. Adherent to descovy/sustiva. Had to replace his roof, adding financial stress. He has not pursued cardiology eval.     12/29/21: since last visit, he had to have cysto x2 to remove bladder stones and repair a stricture. His nocturia is resolved. No incontinence any longer. He will have echo to screen thoracic aorta in July. Did have COVID booster and has not yet received influenza vaccine.   Occasional flare of sciatica. Does not feel that he needs PT. He scrubbed his kitchen floor yesterday.     6/27/22: he took a second booster for COVID. reviewed interim. Seeing cardiology and urology.    Now taking a MVI, fish oil, Mg, K, B12, zinc and he feels better. 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     1/9/23: bladder pressure, frequency, no retention prompted  visit, US with mild blood, culture negative and CT without obstruction. Given cipro for 2-3 weeks. Bladder pressure improved, seeing them again 1/27  Prior to [place], he had low grade fever, cough, runny nose, sinus congestion, chest congestion, no purulent discharge, mild mucous related wheezing. Had some mild SOB after coughing. Using some nyquil. Vicks vapo rub , but did not call. Has had his influenza vaccine.     7/10/23 :  He is doing well, off from school for the summer.  Discussed that he will be turning 64 next spring and he will be  investigating Medicare plans and supplements.  He will be grandfathered in through his job to have blue cross indefinitely.  He did go to the eye doctor but not to the dentist.  He still smokes a quarter pack of cigarettes.  He is a little overdue for his urology follow-up.    12/4/23: last visit after 2 decades of care. Feels that he had the flu in October. Hoping to retire next year. Has not seen urology yet. To see primary soon. Still smoking , no incentive to stop.    8/27/24:  This is 1st visit with me.  He was cared for by Dr. Landaverde for about 20 years.  He has been living with HIV for about 32 years and has done very well.  He is currently on Sustiva and Descovy with excellent control.  Also has stage I prostate cancer that has been managed with prostatectomy 05/19/2019.  Mostly on tamsulosin for BPH but currently not taken it.  He does not need it for now.  Also on Crestor 10 mg once a day.  Has labs 12/08/2023 were all within normal limit except for LDH is mildly elevated as 109.       Current Outpatient Medications:     aspirin 81 MG Chew, Take 1 tablet (81 mg total) by mouth once daily., Disp: 30 tablet, Rfl: 1    efavirenz (SUSTIVA) 600 mg Tab, Take 1 tablet (600 mg total) by mouth every evening., Disp: 90 tablet, Rfl: 2    emtricitabine-tenofovir alafen (DESCOVY) 200-25 mg Tab, Take 1 tablet by mouth once daily., Disp: 90 tablet, Rfl: 2    EScitalopram oxalate (LEXAPRO) 10 MG tablet, Take 1 tablet (10 mg total) by mouth once daily., Disp: 90 tablet, Rfl: 3    rosuvastatin (CRESTOR) 10 MG tablet, Take 1 tablet (10 mg total) by mouth once daily., Disp: 90 tablet, Rfl: 3    nitroGLYCERIN (NITROSTAT) 0.4 MG SL tablet, If you have chest pain, place 1 tablet under tongue every 5 min as needed for 3 doses.  Then call your doctor afterwards, Disp: 15 tablet, Rfl: 0    tamsulosin (FLOMAX) 0.4 mg Cap, Take 1 capsule (0.4 mg total) by mouth daily as needed (N/A). Take in evening. (Patient not taking: Reported on  8/27/2024), Disp: 30 capsule, Rfl: 0  Review of patient's allergies indicates:  No Known Allergies  Past Medical History:   Diagnosis Date    Adverse effect of hepatitis B vaccine     non responder    Hepatitis A 1991    High frequency hearing loss     HIV (human immunodeficiency virus infection) 1995    On COM/cRix until kidney stones, sallie 200    Hypercholesterolemia     Hyperlipidemia     Kidney stone 2005    [facility], Lithotripsy    Lipoatrophy     Mild    Pertussis     As a child    Prostate cancer     prostate    Secondary syphilis 2002    Smoker     Vertigo 02/2018    Due to Ear Infection     Past Surgical History:   Procedure Laterality Date    CYSTOSCOPY N/A 8/17/2021    Procedure: CYSTOSCOPY;  Surgeon: Baljinder Johnson MD;  Location: [facility];  Service: Urology;  Laterality: N/A;  unable to pass through bladder neck    CYSTOURETHROSCOPY WITH DIRECT VISION INTERNAL URETHROTOMY N/A 9/9/2021    Procedure: CYSTOSCOPY, WITH DIRECT VISION INTERNAL URETHROTOMY of bladder neck, possible dilation;  Surgeon: Baljinder Johnson MD;  Location: [facility] OR;  Service: Urology;  Laterality: N/A;    LITHOTRIPSY      ROBOT-ASSISTED LAPAROSCOPIC PROSTATECTOMY N/A 5/29/2019    Procedure: ROBOTIC PROSTATECTOMY;  Surgeon: Baljinder Johnson MD;  Location: [facility] OR;  Service: Urology;  Laterality: N/A;    TRANSRECTAL BIOPSY OF PROSTATE WITH ULTRASOUND GUIDANCE N/A 9/25/2018    Procedure: BIOPSY, PROSTATE, RECTAL APPROACH, WITH US GUIDANCE;  Surgeon: Baljinder Johnson MD;  Location: [facility];  Service: Urology;  Laterality: N/A;     Social History     Socioeconomic History    Marital status: Single   Occupational History    Occupation: Teacher   Tobacco Use    Smoking status: Every Day     Current packs/day: 1.00     Types: Cigarettes    Smokeless tobacco: Never    Tobacco comments:     10 to 20 cigarettes daily   Substance and Sexual Activity    Alcohol use: No    Drug use: No    Sexual activity: Never     Comment: Abstinent     Social  Determinants of Health     Financial Resource Strain: Low Risk  (2024)    Overall Financial Resource Strain (CARDIA)     Difficulty of Paying Living Expenses: Not hard at all   Food Insecurity: No Food Insecurity (2024)    Hunger Vital Sign     Worried About Running Out of Food in the Last Year: Never true     Ran Out of Food in the Last Year: Never true   Transportation Needs: No Transportation Needs (2022)    PRAPARE - Transportation     Lack of Transportation (Medical): No     Lack of Transportation (Non-Medical): No   Physical Activity: Inactive (2024)    Exercise Vital Sign     Days of Exercise per Week: 0 days     Minutes of Exercise per Session: 0 min   Stress: No Stress Concern Present (2024)    [employer] - Occupational Stress Questionnaire     Feeling of Stress : Not at all   Housing Stability: Unknown (2022)    Housing Stability Vital Sign     Unable to Pay for Housing in the Last Year: No     Unstable Housing in the Last Year: No     Family History   Problem Relation Name Age of Onset    Diabetes Mother           in a MVC    COPD Father      Lung cancer Paternal Grandmother         Travel History:   Vaccine History: Yearly flu vaccine, Pneumovax , , Prevnar , tetanus 2005, Td AP , Zostavax 2016, hepatitis B ×3 , shingrix x 2 , COVID x 3 , 2022,  menactra , 2021    Advanced Directive:   Safer Sex: Abstinent  Bone Density: 2011 osteopenia  Colonoscopy: ()    Review of Systems    Constitutional: No fever, chills, sweats, fatigue, weakness,        Eyes: No change in vision, loss of vision. Prescription stable    ENT: No sinus drainage, sore throat, mouth pain, or lesions.    Cardiovascular: No chest pain, GAVIN, palpitations or pedal edema    Respiratory: No shortness of breath, GAVIN, cough, wheeze, sputum, pleurisy, or hemoptysis.  Still smokes about 10-20 daily     Gastrointestinal: No abdominal  "pain, nausea, vomiting, diarrhea,  or focal abd pain    Genitourinary: No dysuria, hematuria, incontinence, frequency, flank pain,      Musculoskeletal: no new aches, pains or injuries    Integumentary: No new rashes, lesions,   Neurological: No dizziness, vertigo, unusual headaches,     Psychiatric: he is pleased with lexapro,  continues to take this with good effect    Endocrine: No diabetes Thyroid normal    Lymphatic: No lymphadenopathy, blood loss, anemia,     Objective:      Blood pressure 128/62, pulse 97, height 5' 6" (1.676 m), weight 68.2 kg (150 lb 6.4 oz), SpO2 97%. Body mass index is 24.28 kg/m².  Physical Exam       General:  Pleasant well-groomed middle-aged man in no acute distress   HEENT:  No oral thrush, no cervical adenopathy   Respiratory: Clear to auscultation   CVS:  S1 and 2 heard, no murmurs appreciated   Abdomen: Full, soft, nontender, no palpable organomegaly   Skin: No rash appreciated   CNS: No gross focal deficits   Musculoskeletal system:  No joint abnormalities appreciated   Lower extremities: No leg edema  Psychiatric: usual demeanor    Lymphatic: No cervical lymphadenopathy      Wound:  None    HIV Table:   HLA-B 5701 12/2023    TOXOIgG     RPR 1/2023 non reactive   HEP A IgG 6/8/2015 infection/immune   HBsAg 6/8/2015 negative   HBsAb 12/3/2016 negative, non responder   HBcAb     HBeAb     HBeAg     HCVAb 6/30/22 negative   HBV DNA     HCV RNA     Vitamin D 1/20/2018 40   Chlamydia / GC 12/5/2014 negative   TB Gold  6/30/22  negative  PSA   1/4/23  undetectable  HIV RNA  7/2023  <20  COVID IgG   6/2020  Negative  CD4   7/2023  554    Recent Diagnostics: Reviewed 2021       Assessment and Plan:     1. HIV.  Well controlled on Sustiva and Descovy.  Refills to be done today.  Also with schedule follow up labs include CD4 count, HIV PCR, CMP and CBC.    2. Hyperlipidemia.  Cholesterol normal on Crestor 10 mg daily.  LDL still mildly elevated but this is chronic for him.  Management as " per his PCP.      3. Cigarette smoking.  Smokes about 10-20 daily.    4. BPH.  Continue other medication.  Prostatectomy about 5 years ago.        There are no diagnoses linked to this encounter.

## 2024-11-14 ENCOUNTER — TELEPHONE (OUTPATIENT)
Dept: UROLOGY | Facility: CLINIC | Age: 64
End: 2024-11-14
Payer: COMMERCIAL

## 2025-02-25 ENCOUNTER — OFFICE VISIT (OUTPATIENT)
Dept: INFECTIOUS DISEASES | Facility: CLINIC | Age: 65
End: 2025-02-25
Payer: COMMERCIAL

## 2025-02-25 VITALS
BODY MASS INDEX: 24.69 KG/M2 | TEMPERATURE: 98 F | WEIGHT: 153.63 LBS | HEART RATE: 106 BPM | SYSTOLIC BLOOD PRESSURE: 138 MMHG | HEIGHT: 66 IN | DIASTOLIC BLOOD PRESSURE: 82 MMHG | OXYGEN SATURATION: 97 %

## 2025-02-25 DIAGNOSIS — Z21 ASYMPTOMATIC HIV INFECTION, WITH NO HISTORY OF HIV-RELATED ILLNESS: ICD-10-CM

## 2025-02-25 DIAGNOSIS — N40.0 BENIGN PROSTATIC HYPERPLASIA, UNSPECIFIED WHETHER LOWER URINARY TRACT SYMPTOMS PRESENT: ICD-10-CM

## 2025-02-25 DIAGNOSIS — B20 HUMAN IMMUNODEFICIENCY VIRUS (HIV) DISEASE: Primary | ICD-10-CM

## 2025-02-25 DIAGNOSIS — Z21 HIV INFECTION, UNSPECIFIED SYMPTOM STATUS: Chronic | ICD-10-CM

## 2025-02-25 DIAGNOSIS — Z79.899 LIPODYSTROPHY DUE TO HIV INFECTION AND ANTIRETROVIRAL THERAPY: ICD-10-CM

## 2025-02-25 DIAGNOSIS — E88.1 LIPODYSTROPHY DUE TO HIV INFECTION AND ANTIRETROVIRAL THERAPY: ICD-10-CM

## 2025-02-25 DIAGNOSIS — B20 LIPODYSTROPHY DUE TO HIV INFECTION AND ANTIRETROVIRAL THERAPY: ICD-10-CM

## 2025-02-25 PROCEDURE — 99999 PR PBB SHADOW E&M-EST. PATIENT-LVL III: CPT | Mod: PBBFAC,,, | Performed by: INTERNAL MEDICINE

## 2025-02-25 RX ORDER — ROSUVASTATIN CALCIUM 10 MG/1
10 TABLET, COATED ORAL DAILY
Qty: 90 TABLET | Refills: 3 | Status: SHIPPED | OUTPATIENT
Start: 2025-02-25 | End: 2026-02-25

## 2025-02-25 RX ORDER — ESCITALOPRAM OXALATE 10 MG/1
10 TABLET ORAL DAILY
Qty: 90 TABLET | Refills: 3 | Status: SHIPPED | OUTPATIENT
Start: 2025-02-25 | End: 2026-02-25

## 2025-02-25 RX ORDER — EFAVIRENZ 600 MG/1
600 TABLET, FILM COATED ORAL NIGHTLY
Qty: 90 TABLET | Refills: 3 | Status: SHIPPED | OUTPATIENT
Start: 2025-02-25 | End: 2026-02-25

## 2025-02-25 RX ORDER — EMTRICITABINE AND TENOFOVIR ALAFENAMIDE 200; 25 MG/1; MG/1
1 TABLET ORAL DAILY
Qty: 90 TABLET | Refills: 3 | Status: SHIPPED | OUTPATIENT
Start: 2025-02-25

## 2025-02-25 NOTE — PROGRESS NOTES
Subjective:       Patient ID: Jones Germain is a 64 y.o. male.    Chief Complaint:: Follow-up (6 month follow up/)    Follow-up    HIV Positive/AIDS    Discussed prostate cancer diagnosis and options, admits depression. Concerned about requiring catheter, being incontinent, radiation adverse effects. He is planning to do the surgery before summer break so that he will have time to recover.  Did receive his flu vaccine. Converted to generic atripla  Compliant with meds. Still smoking. Has not yet established with primary care. Discussed that he will need clearance for surgery and anesthesia    6/27/19: he was seen by Dr. De La Torre for cardiac clearance. On 5/29 he had robotic prostatectomy, received bactrim post op and had jones for 2 weeks. Requiring no pain meds but has sensitivity over his midline abdominal incision. Tells me he will not require any additional treatment. He feels like he is emptying his bladder well. Stream is good. Rare leak. He stopped his Atripla for a week after the surgery. Very frustrated with the cost of his care and very limited in his finances. He isolates himself    12/30/19: reviewed recent hospitalization. He went in for chest pain, had a negative experience, had a stress echo which was negative and CT chest and calcium score. 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 He left AMA for personal reasons. He Has reduced cig to 1/4 ppd. Very stressed from work related issues, cost of medical care. Had a diarrheal illness for 2 weeks after that admission and [facility]. He has lots of anxiety, frustration, anger, depression underlying and prior to these events. He did not feel that the wellbutrin did much good. He is also using a nicotine patch which may be too strong for the 1/4 pack he is smoking now. His BP is high as well as pulse. He states that he measures his blood pressure at home from time to time and its ok. Discussed that HTN can make the aorta more dilated and  risk an aneurysm forming. He did not have viralload in July as requested.     6/15/20: he is protecting himself from COVID. He is socializing very little.  lexapro has helped him a great deal. He started doing yoga and he enjoys this.  He has not seen cardiologist yet.     12/30/20: since last visit, he had a fall and radiculopathy consistent with sciatica. He did have benefit from gabapentin and meloxicam but ran out. He is concerned about going back to school and COVID risk. He has not yet seen cardiology for follow up on aortic abnormality    6/28/21: sciatica is almost well, no longer gabapentin or meloxicam. Adherent to descovy/sustiva. Had to replace his roof, adding financial stress. He has not pursued cardiology eval.     12/29/21: since last visit, he had to have cysto x2 to remove bladder stones and repair a stricture. His nocturia is resolved. No incontinence any longer. He will have echo to screen thoracic aorta in July. Did have COVID booster and has not yet received influenza vaccine.   Occasional flare of sciatica. Does not feel that he needs PT. He scrubbed his kitchen floor yesterday.     6/27/22: he took a second booster for COVID. reviewed interim. Seeing cardiology and urology.    Now taking a MVI, fish oil, Mg, K, B12, zinc and he feels better. 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     1/9/23: bladder pressure, frequency, no retention prompted  visit, US with mild blood, culture negative and CT without obstruction. Given cipro for 2-3 weeks. Bladder pressure improved, seeing them again 1/27  Prior to [place], he had low grade fever, cough, runny nose, sinus congestion, chest congestion, no purulent discharge, mild mucous related wheezing. Had some mild SOB after coughing. Using some nyquil. Vicks vapo rub , but did not call. Has had his influenza vaccine.     7/10/23 :  He is doing well, off from school for the summer.  Discussed that he will be turning 64 next spring and he will be  investigating Medicare plans and supplements.  He will be grandfathered in through his job to have blue [employer] indefinitely.  He did go to the eye doctor but not to the dentist.  He still smokes a quarter pack of cigarettes.  He is a little overdue for his urology follow-up.    12/4/23: last visit after 2 decades of care. Feels that he had the flu in October. Hoping to retire next year. Has not seen urology yet. To see primary soon. Still smoking , no incentive to stop.    8/27/24:  This is 1st visit with me.  He was cared for by Dr. Landaverde for about 20 years.  He has been living with HIV for about 32 years and has done very well.  He is currently on Sustiva and Descovy with excellent control.  Also has stage I prostate cancer that has been managed with prostatectomy 05/19/2019.  Mostly on tamsulosin for BPH but currently not taken it.  He does not need it for now.  Also on Crestor 10 mg once a day.  Has labs 12/08/2023 were all within normal limit except for LDH is mildly elevated as 109.    5/25: He is here for follow up.  No acute issues since last visit.  He is compliant to his HAART.  Informs me that he had labs that were ordered as last was done 08/27/2024 and remembers because he pain echo pain..  States he had seen the results on his phone scarlet.  He no longer see them when he checked today.  Also, I do not see the results in the system.  He wants refills for Crestor cover Lexapro and his HAART.       Current Outpatient Medications:     aspirin 81 MG Chew, Take 1 tablet (81 mg total) by mouth once daily., Disp: 30 tablet, Rfl: 1    efavirenz (SUSTIVA) 600 mg Tab, Take 1 tablet (600 mg total) by mouth every evening., Disp: 90 tablet, Rfl: 3    emtricitabine-tenofovir alafen (DESCOVY) 200-25 mg Tab, Take 1 tablet by mouth once daily., Disp: 90 tablet, Rfl: 3    EScitalopram oxalate (LEXAPRO) 10 MG tablet, Take 1 tablet (10 mg total) by mouth once daily., Disp: 90 tablet, Rfl: 3    nitroGLYCERIN (NITROSTAT) 0.4 MG  SL tablet, If you have chest pain, place 1 tablet under tongue every 5 min as needed for 3 doses.  Then call your doctor afterwards, Disp: 15 tablet, Rfl: 0    rosuvastatin (CRESTOR) 10 MG tablet, Take 1 tablet (10 mg total) by mouth once daily., Disp: 90 tablet, Rfl: 3    tamsulosin (FLOMAX) 0.4 mg Cap, Take 1 capsule (0.4 mg total) by mouth daily as needed (N/A). Take in evening. (Patient not taking: Reported on 8/27/2024), Disp: 30 capsule, Rfl: 0  Review of patient's allergies indicates:  No Known Allergies  Past Medical History:   Diagnosis Date    Adverse effect of hepatitis B vaccine     non responder    Hepatitis A 1991    High frequency hearing loss     HIV (human immunodeficiency virus infection) 1995    On COM/cRix until kidney stones, sallie 200    Hypercholesterolemia     Hyperlipidemia     Kidney stone 2005    [facility], Lithotripsy    Lipoatrophy     Mild    Pertussis     As a child    Prostate cancer     prostate    Secondary syphilis 2002    Smoker     Vertigo 02/2018    Due to Ear Infection     Past Surgical History:   Procedure Laterality Date    CYSTOSCOPY N/A 8/17/2021    Procedure: CYSTOSCOPY;  Surgeon: Baljinder Johnson MD;  Location: [facility] OR;  Service: Urology;  Laterality: N/A;  unable to pass through bladder neck    CYSTOURETHROSCOPY WITH DIRECT VISION INTERNAL URETHROTOMY N/A 9/9/2021    Procedure: CYSTOSCOPY, WITH DIRECT VISION INTERNAL URETHROTOMY of bladder neck, possible dilation;  Surgeon: Baljinder Johnson MD;  Location: [facility] OR;  Service: Urology;  Laterality: N/A;    LITHOTRIPSY      ROBOT-ASSISTED LAPAROSCOPIC PROSTATECTOMY N/A 5/29/2019    Procedure: ROBOTIC PROSTATECTOMY;  Surgeon: Baljinder Johnson MD;  Location: [facility] OR;  Service: Urology;  Laterality: N/A;    TRANSRECTAL BIOPSY OF PROSTATE WITH ULTRASOUND GUIDANCE N/A 9/25/2018    Procedure: BIOPSY, PROSTATE, RECTAL APPROACH, WITH US GUIDANCE;  Surgeon: Baljinder Johnson MD;  Location: [facility] OR;  Service: Urology;  Laterality:  N/A;     Social History     Socioeconomic History    Marital status: Single   Occupational History    Occupation: Teacher   Tobacco Use    Smoking status: Every Day     Current packs/day: 1.00     Types: Cigarettes    Smokeless tobacco: Never    Tobacco comments:     10 to 20 cigarettes daily   Substance and Sexual Activity    Alcohol use: No    Drug use: No    Sexual activity: Never     Comment: Abstinent     Social Drivers of Health     Financial Resource Strain: Low Risk  (2024)    Overall Financial Resource Strain (CARDIA)     Difficulty of Paying Living Expenses: Not hard at all   Food Insecurity: No Food Insecurity (2024)    Hunger Vital Sign     Worried About Running Out of Food in the Last Year: Never true     Ran Out of Food in the Last Year: Never true   Transportation Needs: No Transportation Needs (2022)    PRAPARE - Transportation     Lack of Transportation (Medical): No     Lack of Transportation (Non-Medical): No   Physical Activity: Inactive (2024)    Exercise Vital Sign     Days of Exercise per Week: 0 days     Minutes of Exercise per Session: 0 min   Stress: No Stress Concern Present (2024)    [employer] - Occupational Stress Questionnaire     Feeling of Stress : Not at all   Housing Stability: Unknown (2022)    Housing Stability Vital Sign     Unable to Pay for Housing in the Last Year: No     Unstable Housing in the Last Year: No     Family History   Problem Relation Name Age of Onset    Diabetes Mother           in a MVC    COPD Father      Lung cancer Paternal Grandmother         Travel History:   Vaccine History: Yearly flu vaccine, Pneumovax , , Prevnar , tetanus 2005, Td AP , Zostavax 2016, hepatitis B ×3 , shingrix x 2 , COVID x 3 , 2022,  menactra , 2021    Advanced Directive:   Safer Sex: Abstinent  Bone Density: 2011 osteopenia  Colonoscopy: ()    Review of Systems  : 10  "system review unremarkable.  As in HPI.    Objective:      Height 5' 6" (1.676 m), weight 69.7 kg (153 lb 9.6 oz). Body mass index is 24.79 kg/m².  Physical Exam       General:  Pleasant well-groomed middle-aged man in no acute distress   HEENT:  No oral thrush, no cervical adenopathy   Respiratory: Clear to auscultation   CVS:  S1 and 2 heard, no murmurs appreciated   Abdomen: Full, soft, nontender, no palpable organomegaly   Skin: No rash appreciated   CNS: No gross focal deficits   Musculoskeletal system:  No joint abnormalities appreciated   Lower extremities: No leg edema  Psychiatric: usual demeanor    Lymphatic: No cervical lymphadenopathy      Wound:  None    HIV Table:   HLA-B 5701 12/2023    TOXOIgG     RPR 1/2023 non reactive   HEP A IgG 6/8/2015 infection/immune   HBsAg 6/8/2015 negative   HBsAb 12/3/2016 negative, non responder   HBcAb     HBeAb     HBeAg     HCVAb 6/30/22 negative   HBV DNA     HCV RNA     Vitamin D 1/20/2018 40   Chlamydia / GC 12/5/2014 negative   TB Gold  6/30/22  negative  PSA   1/4/23  undetectable  HIV RNA  7/2023  <20  COVID IgG   6/2020  Negative  CD4   7/2023  554    Recent Diagnostics: Reviewed 2021       Assessment and Plan:     1. HIV.  Well controlled on Sustiva and Descovy.  Last lab in the system was from December 20, 2023.  States he will go for labs next weekend.  Refills given.    2. Hyperlipidemia.  He is on Crestor 10 mg daily.  Refills given.  Check lipid panel.    3. Cigarette smoking.  Smokes about 10-20 daily.    4. BPH.  He had Prostatectomy about 5 years ago.  States no longer taken tamsulosin because he does not need it.    I will call with lab results when available.  I will see him again 6 months.  Spent 32 minutes on his care today      There are no diagnoses linked to this encounter.            "

## 2025-02-25 NOTE — PATIENT INSTRUCTIONS
Need labs. Can still use the order from 8/27/24. Let me know if they need new orders  I will see you again in 6 months

## (undated) DEVICE — SET IRR URLGY 2LINE UNIV SPIKE

## (undated) DEVICE — PACK BASIC

## (undated) DEVICE — DRESSING TRANS 4X4 TEGADERM

## (undated) DEVICE — GUIDEWIRE AMPLATZ .035X260

## (undated) DEVICE — SCRUB 10% POVIDONE IODINE 4OZ

## (undated) DEVICE — SEE MEDLINE ITEM 157117

## (undated) DEVICE — TUBING PNEUMO

## (undated) DEVICE — HEMOSTAT SURGICEL 4X8IN

## (undated) DEVICE — Device

## (undated) DEVICE — LINER SUCTION 3000CC

## (undated) DEVICE — KIT CATH SURESTEP 350ML URIN18

## (undated) DEVICE — LUBRICANT SURGILUBE 2 OZ

## (undated) DEVICE — SLEEVE SCD EXPRESS CALF MEDIUM

## (undated) DEVICE — SPONGE GAUZE 16PLY 4X4

## (undated) DEVICE — WATER STERILE INJ 500ML BAG

## (undated) DEVICE — SUT 2-0 VICRYL / SH (J417)

## (undated) DEVICE — CONTAINER SPECIMEN STRL 4OZ

## (undated) DEVICE — IRRIGATOR ENDOSCOPY DISP.

## (undated) DEVICE — SYR 50ML CATH TIP

## (undated) DEVICE — SOL IRR WATER STRL 3000 ML

## (undated) DEVICE — SUT STRATAFIX PGAPCL 3 RB-1

## (undated) DEVICE — SEE MEDLINE ITEM 152622

## (undated) DEVICE — SEE MEDLINE ITEM 152487

## (undated) DEVICE — SUT ABS CLIP LAPRA-TY CTD

## (undated) DEVICE — DRESSING TRANS 4X4 3/4

## (undated) DEVICE — DRAIN SIL FLT 7MM FULL PERF ST

## (undated) DEVICE — BAG TISS RETRV MONARCH 10MM

## (undated) DEVICE — SHEET DRAPE MEDIUM

## (undated) DEVICE — SUT MONOCRYL 4-0 PS-2

## (undated) DEVICE — UNDERGLOVES BIOGEL PI SZ 7 LF

## (undated) DEVICE — GLOVE SURG ULTRA TOUCH 7

## (undated) DEVICE — SEE MEDLINE ITEM 157185

## (undated) DEVICE — KIT ROBOTIC 4 ARM DA VINCI SI

## (undated) DEVICE — TRAY DRY SPONGE SCRUB W FOAM

## (undated) DEVICE — NDL PNEUMOPERITONEUM 150MM

## (undated) DEVICE — TUBE NG W/ANTI-REFLUX FILTER 1

## (undated) DEVICE — CLIP HEMO-LOK MLX LARGE LF

## (undated) DEVICE — SEE MEDLINE ITEM 157118

## (undated) DEVICE — SOL CLEARIFY VISUALIZATION LAP

## (undated) DEVICE — SUT EASE CROSSBOW CLSR SYS

## (undated) DEVICE — EVACUATOR WOUND BULB 100CC

## (undated) DEVICE — SCISSOR 5MMX35CM DIRECT DRIVE

## (undated) DEVICE — SLEEVE SCD EXPRESS KNEE MEDIUM

## (undated) DEVICE — DRAPE MINOR PROCEDURE

## (undated) DEVICE — ADHESIVE DERMABOND ADVANCED

## (undated) DEVICE — SOL PVP-I SCRUB 7.5% 4OZ

## (undated) DEVICE — SET CYSTO IRRIGATION UNIV SPIK

## (undated) DEVICE — SOL IRR NACL .9% 3000ML

## (undated) DEVICE — JELLY SURGILUBE LUBE TUBE 2OZ

## (undated) DEVICE — DRAPE LAVH LAPAROSCOPY W/FLUID

## (undated) DEVICE — PACK CUSTOM UNIV BASIN SLI

## (undated) DEVICE — SYR 10CC LUER LOCK

## (undated) DEVICE — SPONGE IV DRAIN 4X4 STERILE

## (undated) DEVICE — SOL ELECTROLUBE ANTI-STIC

## (undated) DEVICE — APPLICATOR CHLORAPREP ORN 26ML

## (undated) DEVICE — SUT 1 36IN PDS II

## (undated) DEVICE — BAG LINGEMAN DRAIN UROLOGY

## (undated) DEVICE — SOL WATER STRL IRR 1000ML

## (undated) DEVICE — ELECTRODE REM PLYHSV RETURN 9

## (undated) DEVICE — SPONGE SUPER KERLIX 6X6.75IN

## (undated) DEVICE — SUT ETHILON 2-0 BLK PS-2

## (undated) DEVICE — BLADE SURG CARBON STEEL SZ11

## (undated) DEVICE — CORD BIPOLAR 12 FOOT

## (undated) DEVICE — CLIPPER BLADE MOD 4406 (CAREF)

## (undated) DEVICE — COVER TIP CURVED SCISSORS XI

## (undated) DEVICE — SUT 2-0 VICRYL / CT-1

## (undated) DEVICE — CLIP HEMO-LOK ML

## (undated) DEVICE — SUT 3-0 VICRYL / RB-1

## (undated) DEVICE — SEALER ENDOWRIST ONE VESSEL